# Patient Record
Sex: FEMALE | Race: WHITE | NOT HISPANIC OR LATINO | Employment: OTHER | URBAN - METROPOLITAN AREA
[De-identification: names, ages, dates, MRNs, and addresses within clinical notes are randomized per-mention and may not be internally consistent; named-entity substitution may affect disease eponyms.]

---

## 2021-01-18 ENCOUNTER — IMMUNIZATIONS (OUTPATIENT)
Dept: FAMILY MEDICINE CLINIC | Facility: HOSPITAL | Age: 86
End: 2021-01-18

## 2021-01-18 DIAGNOSIS — Z23 ENCOUNTER FOR IMMUNIZATION: Primary | ICD-10-CM

## 2021-01-18 PROCEDURE — 0001A SARS-COV-2 / COVID-19 MRNA VACCINE (PFIZER-BIONTECH) 30 MCG: CPT

## 2021-01-18 PROCEDURE — 91300 SARS-COV-2 / COVID-19 MRNA VACCINE (PFIZER-BIONTECH) 30 MCG: CPT

## 2021-02-06 ENCOUNTER — IMMUNIZATIONS (OUTPATIENT)
Dept: FAMILY MEDICINE CLINIC | Facility: HOSPITAL | Age: 86
End: 2021-02-06

## 2021-02-06 DIAGNOSIS — Z23 ENCOUNTER FOR IMMUNIZATION: Primary | ICD-10-CM

## 2021-02-06 PROCEDURE — 91300 SARS-COV-2 / COVID-19 MRNA VACCINE (PFIZER-BIONTECH) 30 MCG: CPT

## 2021-02-06 PROCEDURE — 0002A SARS-COV-2 / COVID-19 MRNA VACCINE (PFIZER-BIONTECH) 30 MCG: CPT

## 2022-05-08 ENCOUNTER — HOSPITAL ENCOUNTER (EMERGENCY)
Facility: HOSPITAL | Age: 87
Discharge: HOME | End: 2022-05-09
Attending: EMERGENCY MEDICINE | Admitting: EMERGENCY MEDICINE
Payer: MEDICARE

## 2022-05-08 DIAGNOSIS — E87.6 HYPOKALEMIA: Primary | ICD-10-CM

## 2022-05-08 DIAGNOSIS — E86.0 DEHYDRATION: ICD-10-CM

## 2022-05-08 DIAGNOSIS — R79.89 PRERENAL AZOTEMIA: ICD-10-CM
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
## 2022-05-08 DIAGNOSIS — G89.11 ACUTE PAIN DUE TO TRAUMA: ICD-10-CM

## 2022-05-08 DIAGNOSIS — S01.01XA LACERATION OF SCALP, INITIAL ENCOUNTER: ICD-10-CM

## 2022-05-08 PROCEDURE — 99284 EMERGENCY DEPT VISIT MOD MDM: CPT

## 2022-05-09 ENCOUNTER — APPOINTMENT (EMERGENCY)
Dept: CT IMAGING | Facility: HOSPITAL | Age: 87
End: 2022-05-09
Payer: MEDICARE

## 2022-05-09 VITALS
SYSTOLIC BLOOD PRESSURE: 163 MMHG | RESPIRATION RATE: 18 BRPM | TEMPERATURE: 97.4 F | OXYGEN SATURATION: 95 % | HEART RATE: 68 BPM | DIASTOLIC BLOOD PRESSURE: 66 MMHG

## 2022-05-09 LAB
ALBUMIN SERPL BCP-MCNC: 3.4 G/DL (ref 3.5–5)
ALP SERPL-CCNC: 43 U/L (ref 34–104)
ALT SERPL W P-5'-P-CCNC: 7 U/L (ref 7–52)
ANION GAP SERPL CALCULATED.3IONS-SCNC: 6 MMOL/L (ref 4–13)
AST SERPL W P-5'-P-CCNC: 12 U/L (ref 13–39)
ATRIAL RATE: 69 BPM
BASOPHILS # BLD AUTO: 0.04 THOUSANDS/ΜL (ref 0–0.1)
BASOPHILS NFR BLD AUTO: 1 % (ref 0–1)
BILIRUB SERPL-MCNC: 0.39 MG/DL (ref 0.2–1)
BUN SERPL-MCNC: 32 MG/DL (ref 5–25)
CALCIUM ALBUM COR SERPL-MCNC: 9.2 MG/DL (ref 8.3–10.1)
CALCIUM SERPL-MCNC: 8.7 MG/DL (ref 8.4–10.2)
CARDIAC TROPONIN I PNL SERPL HS: 13 NG/L
CHLORIDE SERPL-SCNC: 104 MMOL/L (ref 96–108)
CO2 SERPL-SCNC: 30 MMOL/L (ref 21–32)
CREAT SERPL-MCNC: 1.31 MG/DL (ref 0.6–1.3)
EOSINOPHIL # BLD AUTO: 0.1 THOUSAND/ΜL (ref 0–0.61)
EOSINOPHIL NFR BLD AUTO: 1 % (ref 0–6)
ERYTHROCYTE [DISTWIDTH] IN BLOOD BY AUTOMATED COUNT: 15.6 % (ref 11.6–15.1)
GFR SERPL CREATININE-BSD FRML MDRD: 34 ML/MIN/1.73SQ M
GLUCOSE SERPL-MCNC: 106 MG/DL (ref 65–140)
HCT VFR BLD AUTO: 34.9 % (ref 34.8–46.1)
HGB BLD-MCNC: 10.5 G/DL (ref 11.5–15.4)
IMM GRANULOCYTES # BLD AUTO: 0.03 THOUSAND/UL (ref 0–0.2)
IMM GRANULOCYTES NFR BLD AUTO: 0 % (ref 0–2)
LYMPHOCYTES # BLD AUTO: 1.73 THOUSANDS/ΜL (ref 0.6–4.47)
LYMPHOCYTES NFR BLD AUTO: 23 % (ref 14–44)
MCH RBC QN AUTO: 23 PG (ref 26.8–34.3)
MCHC RBC AUTO-ENTMCNC: 30.1 G/DL (ref 31.4–37.4)
MCV RBC AUTO: 76 FL (ref 82–98)
MONOCYTES # BLD AUTO: 0.78 THOUSAND/ΜL (ref 0.17–1.22)
MONOCYTES NFR BLD AUTO: 11 % (ref 4–12)
NEUTROPHILS # BLD AUTO: 4.71 THOUSANDS/ΜL (ref 1.85–7.62)
NEUTS SEG NFR BLD AUTO: 64 % (ref 43–75)
NRBC BLD AUTO-RTO: 0 /100 WBCS
P AXIS: 64 DEGREES
PLATELET # BLD AUTO: 220 THOUSANDS/UL (ref 149–390)
PMV BLD AUTO: 12.7 FL (ref 8.9–12.7)
POTASSIUM SERPL-SCNC: 3.4 MMOL/L (ref 3.5–5.3)
PR INTERVAL: 194 MS
PROT SERPL-MCNC: 6.5 G/DL (ref 6.4–8.4)
QRS AXIS: 65 DEGREES
QRSD INTERVAL: 84 MS
QT INTERVAL: 382 MS
QTC INTERVAL: 409 MS
RBC # BLD AUTO: 4.57 MILLION/UL (ref 3.81–5.12)
SODIUM SERPL-SCNC: 140 MMOL/L (ref 135–147)
T WAVE AXIS: 65 DEGREES
VENTRICULAR RATE: 69 BPM
WBC # BLD AUTO: 7.39 THOUSAND/UL (ref 4.31–10.16)

## 2022-05-09 PROCEDURE — 93010 ELECTROCARDIOGRAM REPORT: CPT | Performed by: INTERNAL MEDICINE

## 2022-05-09 PROCEDURE — 36415 COLL VENOUS BLD VENIPUNCTURE: CPT | Performed by: EMERGENCY MEDICINE

## 2022-05-09 PROCEDURE — 93005 ELECTROCARDIOGRAM TRACING: CPT

## 2022-05-09 PROCEDURE — 85025 COMPLETE CBC W/AUTO DIFF WBC: CPT | Performed by: EMERGENCY MEDICINE

## 2022-05-09 PROCEDURE — 90715 TDAP VACCINE 7 YRS/> IM: CPT | Performed by: EMERGENCY MEDICINE

## 2022-05-09 PROCEDURE — 70450 CT HEAD/BRAIN W/O DYE: CPT

## 2022-05-09 PROCEDURE — G1004 CDSM NDSC: HCPCS

## 2022-05-09 PROCEDURE — 80053 COMPREHEN METABOLIC PANEL: CPT | Performed by: EMERGENCY MEDICINE

## 2022-05-09 PROCEDURE — 99284 EMERGENCY DEPT VISIT MOD MDM: CPT | Performed by: EMERGENCY MEDICINE

## 2022-05-09 PROCEDURE — 84484 ASSAY OF TROPONIN QUANT: CPT | Performed by: EMERGENCY MEDICINE

## 2022-05-09 PROCEDURE — 12013 RPR F/E/E/N/L/M 2.6-5.0 CM: CPT | Performed by: EMERGENCY MEDICINE

## 2022-05-09 RX ORDER — POTASSIUM CHLORIDE 20 MEQ/1
40 TABLET, EXTENDED RELEASE ORAL ONCE
Status: COMPLETED | OUTPATIENT
Start: 2022-05-09 | End: 2022-05-09

## 2022-05-09 RX ORDER — GINSENG 100 MG
1 CAPSULE ORAL ONCE
Status: COMPLETED | OUTPATIENT
Start: 2022-05-09 | End: 2022-05-09

## 2022-05-09 RX ADMIN — POTASSIUM CHLORIDE 40 MEQ: 1500 TABLET, EXTENDED RELEASE ORAL at 03:09

## 2022-05-09 RX ADMIN — BACITRACIN 1 SMALL APPLICATION: 500 OINTMENT TOPICAL at 00:46

## 2022-05-09 RX ADMIN — TETANUS TOXOID, REDUCED DIPHTHERIA TOXOID AND ACELLULAR PERTUSSIS VACCINE, ADSORBED 0.5 ML: 5; 2.5; 8; 8; 2.5 SUSPENSION INTRAMUSCULAR at 00:47

## 2022-05-09 NOTE — ED PROVIDER NOTES
History  Chief Complaint   Patient presents with    Head Laceration     patient was attempting to cut her toenails when she fell off the couch  family member states that she thinks the clippers when into her forehead,      28-year-old female was working in her kitchen, slipped and fell and hit her head, patient reports she was trying to cut her toenails and may have had the clippers hit her in the head  Patient did not lose consciousness, has not had any vision changes, headache, dizziness, nausea, vomiting, chest pain, cough, shortness of breath, abdominal pain, urinary GI complaints and no other complaints  Patient does not remember last tetanus shot, so she was not dizzy before she fell, is not on any blood thinners  Patient now is complaining of lacerations on the left side of her forehead  Patient after fall had difficulty getting up and was on the ground for 2 hours  None       Past Medical History:   Diagnosis Date    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)     brain       History reviewed  No pertinent surgical history  History reviewed  No pertinent family history  I have reviewed and agree with the history as documented  E-Cigarette/Vaping    E-Cigarette Use Never User      E-Cigarette/Vaping Substances     Social History     Tobacco Use    Smoking status: Never Smoker    Smokeless tobacco: Never Used   Vaping Use    Vaping Use: Never used   Substance Use Topics    Alcohol use: Never    Drug use: Never       Review of Systems   Constitutional: Negative for fever  HENT: Negative for congestion  Eyes: Negative for visual disturbance  Respiratory: Negative for cough and shortness of breath  Cardiovascular: Negative for chest pain  Gastrointestinal: Negative for abdominal pain, constipation, diarrhea, nausea and vomiting  Endocrine: Negative for polyuria  Genitourinary: Negative for dysuria and hematuria  Musculoskeletal: Negative for myalgias  Skin: Positive for wound     Neurological: Negative for dizziness and headaches  Physical Exam  Physical Exam  Vitals and nursing note reviewed  Constitutional:       Appearance: Normal appearance  HENT:      Head: Normocephalic and atraumatic  Right Ear: External ear normal       Left Ear: External ear normal       Mouth/Throat:      Mouth: Mucous membranes are moist       Pharynx: Oropharynx is clear  Eyes:      Extraocular Movements: Extraocular movements intact  Conjunctiva/sclera: Conjunctivae normal    Cardiovascular:      Rate and Rhythm: Normal rate and regular rhythm  Heart sounds: Normal heart sounds  Pulmonary:      Effort: Pulmonary effort is normal       Breath sounds: Normal breath sounds  Abdominal:      General: Abdomen is flat  There is no distension  Palpations: Abdomen is soft  Musculoskeletal:         General: No deformity  Normal range of motion  Cervical back: Normal range of motion  Skin:     General: Skin is warm and dry  Comments: To lacerations on the left side of the forehead, one 1 5 cm L-shaped laceration over the temple, one linear 0 75 cm laceration over the zygomatic arch  Neurological:      General: No focal deficit present  Mental Status: She is alert  Cranial Nerves: No cranial nerve deficit  Sensory: No sensory deficit  Motor: No weakness        Gait: Gait normal    Psychiatric:         Mood and Affect: Mood normal          Behavior: Behavior normal          Vital Signs  ED Triage Vitals [05/08/22 2233]   Temperature Pulse Respirations Blood Pressure SpO2   (!) 97 4 °F (36 3 °C) 73 18 163/66 98 %      Temp Source Heart Rate Source Patient Position - Orthostatic VS BP Location FiO2 (%)   Oral Monitor Sitting Right arm --      Pain Score       No Pain           Vitals:    05/08/22 2233 05/09/22 0015   BP: 163/66    Pulse: 73 68   Patient Position - Orthostatic VS: Sitting          Visual Acuity      ED Medications  Medications tetanus-diphtheria-acellular pertussis (BOOSTRIX) IM injection 0 5 mL (0 5 mL Intramuscular Given 5/9/22 0047)   bacitracin topical ointment 1 small application (1 small application Topical Given 5/9/22 0046)   potassium chloride (K-DUR,KLOR-CON) CR tablet 40 mEq (40 mEq Oral Given 5/9/22 0309)       Diagnostic Studies  Results Reviewed     Procedure Component Value Units Date/Time    HS Troponin 0hr (reflex protocol) [[id number]]  (Normal) Collected: 05/09/22 0043    Lab Status: Final result Specimen: Blood from Arm, Right Updated: 05/09/22 0133     hs TnI 0hr 13 ng/L     Comprehensive metabolic panel [[id number]]  (Abnormal) Collected: 05/09/22 0043    Lab Status: Final result Specimen: Blood from Arm, Right Updated: 05/09/22 0127     Sodium 140 mmol/L      Potassium 3 4 mmol/L      Chloride 104 mmol/L      CO2 30 mmol/L      ANION GAP 6 mmol/L      BUN 32 mg/dL      Creatinine 1 31 mg/dL      Glucose 106 mg/dL      Calcium 8 7 mg/dL      Corrected Calcium 9 2 mg/dL      AST 12 U/L      ALT 7 U/L      Alkaline Phosphatase 43 U/L      Total Protein 6 5 g/dL      Albumin 3 4 g/dL      Total Bilirubin 0 39 mg/dL      eGFR 34 ml/min/1 73sq m     Narrative:      Mya guidelines for Chronic Kidney Disease (CKD):     Stage 1 with normal or high GFR (GFR > 90 mL/min/1 73 square meters)    Stage 2 Mild CKD (GFR = 60-89 mL/min/1 73 square meters)    Stage 3A Moderate CKD (GFR = 45-59 mL/min/1 73 square meters)    Stage 3B Moderate CKD (GFR = 30-44 mL/min/1 73 square meters)    Stage 4 Severe CKD (GFR = 15-29 mL/min/1 73 square meters)    Stage 5 End Stage CKD (GFR <15 mL/min/1 73 square meters)  Note: GFR calculation is accurate only with a steady state creatinine    CBC and differential [[id number]]  (Abnormal) Collected: 05/09/22 0043    Lab Status: Final result Specimen: Blood from Arm, Right Updated: 05/09/22 0104     WBC 7 39 Thousand/uL      RBC 4 57 Million/uL      Hemoglobin 10 5 g/dL      Hematocrit 34 9 %      MCV 76 fL      MCH 23 0 pg      MCHC 30 1 g/dL      RDW 15 6 %      MPV 12 7 fL      Platelets 363 Thousands/uL      nRBC 0 /100 WBCs      Neutrophils Relative 64 %      Immat GRANS % 0 %      Lymphocytes Relative 23 %      Monocytes Relative 11 %      Eosinophils Relative 1 %      Basophils Relative 1 %      Neutrophils Absolute 4 71 Thousands/µL      Immature Grans Absolute 0 03 Thousand/uL      Lymphocytes Absolute 1 73 Thousands/µL      Monocytes Absolute 0 78 Thousand/µL      Eosinophils Absolute 0 10 Thousand/µL      Basophils Absolute 0 04 Thousands/µL                  CT head without contrast   Final Result by Ranulfo Alcazar MD (05/09 0124)      No acute intracranial abnormality  Advanced chronic microangiopathic changes  Moderate ventriculomegaly which may be on the basis of volume loss although slightly out of proportion  Correlate clinically for superimposed component of communicating hydrocephalus i e  NPH  Postsurgical changes of prior left frontotemporal craniotomy and aneurysm clipping  Workstation performed: [id number]                    Procedures  Laceration repair    Date/Time: 5/9/2022 3:29 AM  Performed by: Anil Kathleen MD  Authorized by: Anil Kathleen MD   Consent: Verbal consent obtained  Written consent not obtained  Risks and benefits: risks, benefits and alternatives were discussed  Consent given by: patient  Patient identity confirmed: verbally with patient and arm band  Body area: head/neck  Location details: forehead  Wound length (cm): 1cm, 2cm    Foreign bodies: no foreign bodies  Tendon involvement: none  Nerve involvement: none  Vascular damage: no  Anesthesia: local infiltration    Anesthesia:  Local Anesthetic: lidocaine 1% without epinephrine  Anesthetic total: 3 mL    Sedation:  Patient sedated: no      Wound Dehiscence:  Superficial Wound Dehiscence: simple closure      Procedure Details:  Preparation: Patient was prepped and draped in the usual sterile fashion  Irrigation solution: saline  Debridement: none  Degree of undermining: none  Skin closure: 5-0 nylon  Number of sutures: 7  Technique: simple  Approximation: close  Approximation difficulty: simple  Dressing: antibiotic ointment (bandaids)               ED Course       MDM  Number of Diagnoses or Management Options  Acute pain due to trauma  Dehydration  Fall from standing  Hypokalemia  Laceration of scalp, initial encounter  Prerenal azotemia  Diagnosis management comments: Patient's workup for head trauma was unremarkable, CT was negative, patient's lacerations on her head were sutured up, patient was found to be hypokalemic and dehydrated with pre renal azotemia, patient reports that due to her dehydration she does not urinate very often and urinated just prior to arrival   After suturing the patient was asked if she could provide a urine sample  She is stating she would like to go home and does not want any IV fluids or to have her urine checked  Patient appears capable of declining medical care, her niece at the bedside is a nurse and is going to help her drink plenty of p o  fluids, the patient will return for suture removal and sooner if there are any complications or worsening of her symptoms  Patient is safe for discharge home with close follow-up with primary care as an outpatient        Disposition  Final diagnoses:   Hypokalemia   Prerenal azotemia   Laceration of scalp, initial encounter   Fall from standing   Acute pain due to trauma   Dehydration     Time reflects when diagnosis was documented in both MDM as applicable and the Disposition within this note     Time User Action Codes Description Comment    5/9/2022  1:37 AM Hattie Kwong Add [E87 6] Hypokalemia     5/9/2022  1:37 AM Hattie Kwong Add [R79 89] Prerenal azotemia     5/9/2022  1:37 AM Hattie Kwong Add [S01 01XA] Laceration of scalp, initial encounter 5/9/2022  1:37 AM Pricilla Lowers Add [V48  ZIGV] Fall from standing     5/9/2022  1:37 AM Pricilla Lowers Add [G89 11] Acute pain due to trauma     5/9/2022  1:37 AM Pricilla Lowers Add [E86 0] Dehydration       ED Disposition     ED Disposition Condition Date/Time Comment    Discharge Stable Mon May 9, 2022  3:29 AM Andrzej Rivera discharge to home/self care  Follow-up Information    None         Patient's Medications    No medications on file       No discharge procedures on file      PDMP Review     None          ED Provider  Electronically Signed by           Randall Robles MD  05/09/22 9097

## 2022-09-24 ENCOUNTER — HOSPITAL ENCOUNTER (INPATIENT)
Facility: HOSPITAL | Age: 87
LOS: 5 days | Discharge: NURSING, REHAB OR LONG-TERM CARE | DRG: 689 | End: 2022-09-30
Attending: EMERGENCY MEDICINE | Admitting: HOSPITALIST
Payer: MEDICARE

## 2022-09-24 DIAGNOSIS — R62.7 FTT (FAILURE TO THRIVE) IN ADULT: Primary | ICD-10-CM

## 2022-09-24 DIAGNOSIS — D64.9 ANEMIA, UNSPECIFIED TYPE: ICD-10-CM

## 2022-09-24 DIAGNOSIS — U07.1 COVID: ICD-10-CM

## 2022-09-24 PROBLEM — R60.0 LOWER EXTREMITY EDEMA: Status: ACTIVE | Noted: 2022-09-24

## 2022-09-24 PROBLEM — N18.9 CKD (CHRONIC KIDNEY DISEASE): Status: ACTIVE | Noted: 2022-09-24

## 2022-09-24 PROBLEM — G93.40 ACUTE ENCEPHALOPATHY: Status: ACTIVE | Noted: 2022-09-24

## 2022-09-24 PROBLEM — E78.5 HLD (HYPERLIPIDEMIA): Status: ACTIVE | Noted: 2022-09-24

## 2022-09-24 PROBLEM — E03.9 HYPOTHYROIDISM: Status: ACTIVE | Noted: 2022-09-24

## 2022-09-24 PROBLEM — I10 HTN (HYPERTENSION): Status: ACTIVE | Noted: 2022-09-24

## 2022-09-24 LAB
ALBUMIN SERPL BCP-MCNC: 3.2 G/DL (ref 3.5–5)
ALP SERPL-CCNC: 42 U/L (ref 34–104)
ALT SERPL W P-5'-P-CCNC: 6 U/L (ref 7–52)
ANION GAP SERPL CALCULATED.3IONS-SCNC: 6 MMOL/L (ref 4–13)
AST SERPL W P-5'-P-CCNC: 14 U/L (ref 13–39)
BASOPHILS # BLD AUTO: 0.03 THOUSANDS/ΜL (ref 0–0.1)
BASOPHILS NFR BLD AUTO: 1 % (ref 0–1)
BILIRUB SERPL-MCNC: 0.5 MG/DL (ref 0.2–1)
BUN SERPL-MCNC: 25 MG/DL (ref 5–25)
CALCIUM ALBUM COR SERPL-MCNC: 9.2 MG/DL (ref 8.3–10.1)
CALCIUM SERPL-MCNC: 8.6 MG/DL (ref 8.4–10.2)
CHLORIDE SERPL-SCNC: 99 MMOL/L (ref 96–108)
CO2 SERPL-SCNC: 30 MMOL/L (ref 21–32)
CREAT SERPL-MCNC: 1.3 MG/DL (ref 0.6–1.3)
EOSINOPHIL # BLD AUTO: 0.13 THOUSAND/ΜL (ref 0–0.61)
EOSINOPHIL NFR BLD AUTO: 2 % (ref 0–6)
ERYTHROCYTE [DISTWIDTH] IN BLOOD BY AUTOMATED COUNT: 15.8 % (ref 11.6–15.1)
GFR SERPL CREATININE-BSD FRML MDRD: 35 ML/MIN/1.73SQ M
GLUCOSE SERPL-MCNC: 94 MG/DL (ref 65–140)
HCT VFR BLD AUTO: 32.7 % (ref 34.8–46.1)
HGB BLD-MCNC: 9.7 G/DL (ref 11.5–15.4)
IMM GRANULOCYTES # BLD AUTO: 0.04 THOUSAND/UL (ref 0–0.2)
IMM GRANULOCYTES NFR BLD AUTO: 1 % (ref 0–2)
LYMPHOCYTES # BLD AUTO: 1.03 THOUSANDS/ΜL (ref 0.6–4.47)
LYMPHOCYTES NFR BLD AUTO: 17 % (ref 14–44)
MCH RBC QN AUTO: 22.4 PG (ref 26.8–34.3)
MCHC RBC AUTO-ENTMCNC: 29.7 G/DL (ref 31.4–37.4)
MCV RBC AUTO: 76 FL (ref 82–98)
MONOCYTES # BLD AUTO: 0.78 THOUSAND/ΜL (ref 0.17–1.22)
MONOCYTES NFR BLD AUTO: 13 % (ref 4–12)
NEUTROPHILS # BLD AUTO: 3.95 THOUSANDS/ΜL (ref 1.85–7.62)
NEUTS SEG NFR BLD AUTO: 66 % (ref 43–75)
NRBC BLD AUTO-RTO: 0 /100 WBCS
PLATELET # BLD AUTO: 177 THOUSANDS/UL (ref 149–390)
PMV BLD AUTO: 12.2 FL (ref 8.9–12.7)
POTASSIUM SERPL-SCNC: 3.7 MMOL/L (ref 3.5–5.3)
PROT SERPL-MCNC: 6.4 G/DL (ref 6.4–8.4)
RBC # BLD AUTO: 4.33 MILLION/UL (ref 3.81–5.12)
SODIUM SERPL-SCNC: 135 MMOL/L (ref 135–147)
TSH SERPL DL<=0.05 MIU/L-ACNC: 1.69 UIU/ML (ref 0.45–4.5)
WBC # BLD AUTO: 5.96 THOUSAND/UL (ref 4.31–10.16)

## 2022-09-24 PROCEDURE — 93005 ELECTROCARDIOGRAM TRACING: CPT

## 2022-09-24 PROCEDURE — 84443 ASSAY THYROID STIM HORMONE: CPT | Performed by: EMERGENCY MEDICINE

## 2022-09-24 PROCEDURE — 99220 PR INITIAL OBSERVATION CARE/DAY 70 MINUTES: CPT | Performed by: PHYSICIAN ASSISTANT

## 2022-09-24 PROCEDURE — 80053 COMPREHEN METABOLIC PANEL: CPT | Performed by: EMERGENCY MEDICINE

## 2022-09-24 PROCEDURE — 99285 EMERGENCY DEPT VISIT HI MDM: CPT

## 2022-09-24 PROCEDURE — 36415 COLL VENOUS BLD VENIPUNCTURE: CPT | Performed by: EMERGENCY MEDICINE

## 2022-09-24 PROCEDURE — 85025 COMPLETE CBC W/AUTO DIFF WBC: CPT | Performed by: EMERGENCY MEDICINE

## 2022-09-24 PROCEDURE — 99285 EMERGENCY DEPT VISIT HI MDM: CPT | Performed by: EMERGENCY MEDICINE

## 2022-09-24 RX ORDER — AMLODIPINE BESYLATE 5 MG/1
1 TABLET ORAL DAILY
COMMUNITY
Start: 2022-09-16 | End: 2022-09-30

## 2022-09-24 RX ORDER — LEVOTHYROXINE SODIUM 0.03 MG/1
25 TABLET ORAL DAILY
COMMUNITY
Start: 2022-07-04

## 2022-09-24 RX ORDER — LOSARTAN POTASSIUM AND HYDROCHLOROTHIAZIDE 12.5; 5 MG/1; MG/1
1 TABLET ORAL DAILY
COMMUNITY
Start: 2022-05-08 | End: 2022-09-30

## 2022-09-24 RX ORDER — SIMVASTATIN 20 MG
20 TABLET ORAL DAILY
COMMUNITY
Start: 2022-05-20

## 2022-09-24 NOTE — ED PROVIDER NOTES
History  Chief Complaint   Patient presents with    Hypertension     States her family called EMS because her blood pressure is high  Lives alone---family is worried she cannot live by herself  Pt has no complaints     89 YR FEMALE LIVES ALONE-- BUT HAS FAMILY NEARBY WITH DAILY CHECKS --- NIECE WAS WITH HER LAST NIGHT- LEFT HER IN HER - WHICH SOMETIMES SHE SLEEPS IN -- NIECE CALLED HER SEVERAL TIMES TO CHECK ON HER THIS AFTERNOON AND GOT NO RESPONSE--  NIECE [address] PT STILL IN RECLINER- COVERED IN URINE-- AND SEEMED TO BE MORE UNSTEADY THAN USUAL- NO TRAUMA- NO OTHER COMPS-- PT DENIES ANY COMPLAINTS-       History provided by:  Patient and relative   used: No        None       Past Medical History:   Diagnosis Date    Aneurysm ([facility] [address] )     brain       History reviewed  No pertinent surgical history  History reviewed  No pertinent family history  I have reviewed and agree with the history as documented  E-Cigarette/Vaping    E-Cigarette Use Never User      E-Cigarette/Vaping Substances     Social History     Tobacco Use    Smoking status: Never Smoker    Smokeless tobacco: Never Used   Vaping Use    Vaping Use: Never used   Substance Use Topics    Alcohol use: Never    Drug use: Never       Review of Systems   Constitutional: Positive for activity change  Negative for appetite change, chills, diaphoresis, fatigue, fever and unexpected weight change  HENT: Negative  Eyes: Negative  Respiratory: Negative  Cardiovascular: Negative  Gastrointestinal: Negative  Endocrine: Negative  Genitourinary: Negative  Musculoskeletal: Positive for gait problem  Negative for arthralgias, back pain, joint swelling, myalgias, neck pain and neck stiffness  Skin: Negative  Allergic/Immunologic: Negative  Neurological: Negative for dizziness, tremors, seizures, syncope, facial asymmetry, speech difficulty, weakness, light-headedness, numbness and headaches  Hematological: Negative  Psychiatric/Behavioral: Negative  Physical Exam  Physical Exam  Vitals and nursing note reviewed  Constitutional:       General: She is not in acute distress  Appearance: Normal appearance  She is not ill-appearing, toxic-appearing or diaphoretic  Comments: AVSS-- PULSE OX 97 % ON RA- INTERPRETATION IS NORMAL- NO INTERVENTION -    HENT:      Head: Normocephalic and atraumatic  Comments: NO SCALP TENDERNESS/CONTUSION/ HEMATOMA     Right Ear: Tympanic membrane, ear canal and external ear normal  There is no impacted cerumen  Left Ear: Tympanic membrane, ear canal and external ear normal  There is no impacted cerumen  Nose: Nose normal  No congestion or rhinorrhea  Mouth/Throat:      Mouth: Mucous membranes are moist       Pharynx: Oropharynx is clear  No oropharyngeal exudate or posterior oropharyngeal erythema  Eyes:      General: No scleral icterus  Right eye: No discharge  Left eye: No discharge  Extraocular Movements: Extraocular movements intact  Conjunctiva/sclera: Conjunctivae normal       Pupils: Pupils are equal, round, and reactive to light  Comments: MM PINK   Neck:      Vascular: No carotid bruit  Comments: NO PMT C/T/L/S SPIN E  Cardiovascular:      Rate and Rhythm: Normal rate and regular rhythm  Pulses: Normal pulses  Heart sounds: Normal heart sounds  No murmur heard  No friction rub  No gallop  Pulmonary:      Effort: Pulmonary effort is normal  No respiratory distress  Breath sounds: Normal breath sounds  No stridor  No wheezing, rhonchi or rales  Chest:      Chest wall: No tenderness  Abdominal:      General: Bowel sounds are normal  There is no distension  Palpations: Abdomen is soft  There is no mass  Tenderness: There is no abdominal tenderness  There is no right CVA tenderness, left CVA tenderness, guarding or rebound  Hernia: No hernia is present  Comments: SOFT NT/ND- NO HSM- NO CVA TENDERNESS/ NO ASCITES- NO PERITONEAL SIGNS- NO PULSATILE ABD MASS/BRUIT/ TENDERNESS   Musculoskeletal:         General: No swelling, tenderness, deformity or signs of injury  Normal range of motion  Cervical back: Normal range of motion and neck supple  No rigidity or tenderness  Right lower leg: Edema present  Left lower leg: Edema present  Comments: TRACE BLE PRETIBIAL EDEMA- NT- NO ASYM/ ERYTHEMA- EQUAL BILATERAL RADIAL/DP PULSES   Lymphadenopathy:      Cervical: No cervical adenopathy  Skin:     Capillary Refill: Capillary refill takes less than 2 seconds  Coloration: Skin is pale  Skin is not jaundiced  Findings: No bruising, erythema, lesion or rash  Neurological:      General: No focal deficit present  Mental Status: She is alert and oriented to person, place, and time  Mental status is at baseline  Cranial Nerves: No cranial nerve deficit  Sensory: No sensory deficit  Motor: No weakness        Comments: UNSTEADY ON FEET-- NON FOCAL NEURO EXAM    Psychiatric:         Mood and Affect: Mood normal          Behavior: Behavior normal          Vital Signs  ED Triage Vitals   Temperature Pulse Respirations Blood Pressure SpO2   09/24/22 1435 09/24/22 1435 09/24/22 1435 09/24/22 1435 09/24/22 1435   97 9 °F (36 6 °C) 78 18 123/55 98 %      Temp Source Heart Rate Source Patient Position - Orthostatic VS BP Location FiO2 (%)   09/24/22 1435 09/24/22 1435 09/24/22 1600 09/24/22 1435 --   Oral Monitor Sitting Right arm       Pain Score       09/24/22 1435       No Pain           Vitals:    09/24/22 1435 09/24/22 1600   BP: 123/55 118/57   Pulse: 78 76   Patient Position - Orthostatic VS:  Sitting         Visual Acuity      ED Medications  Medications - No data to display    Diagnostic Studies  Results Reviewed     Procedure Component Value Units Date/Time    CBC and differential [[id number]]  (Abnormal) Collected: 09/24/22 3606 Lab Status: Final result Specimen: Blood from Arm, Left Updated: 09/24/22 1720     WBC 5 96 Thousand/uL      RBC 4 33 Million/uL      Hemoglobin 9 7 g/dL      Hematocrit 32 7 %      MCV 76 fL      MCH 22 4 pg      MCHC 29 7 g/dL      RDW 15 8 %      MPV 12 2 fL      Platelets 342 Thousands/uL      nRBC 0 /100 WBCs      Neutrophils Relative 66 %      Immat GRANS % 1 %      Lymphocytes Relative 17 %      Monocytes Relative 13 %      Eosinophils Relative 2 %      Basophils Relative 1 %      Neutrophils Absolute 3 95 Thousands/µL      Immature Grans Absolute 0 04 Thousand/uL      Lymphocytes Absolute 1 03 Thousands/µL      Monocytes Absolute 0 78 Thousand/µL      Eosinophils Absolute 0 13 Thousand/µL      Basophils Absolute 0 03 Thousands/µL     TSH [[id number]] Collected: 09/24/22 1657    Lab Status: In process Specimen: Blood from Arm, Left Updated: 09/24/22 1716    Comprehensive metabolic panel [[id number]] Collected: 09/24/22 1657    Lab Status: In process Specimen: Blood from Arm, Left Updated: 09/24/22 1716    UA (URINE) with reflex to Scope [[id number]]     Lab Status: No result Specimen: Urine                  No orders to display              Procedures  Procedures         ED Course  ED Course as of 09/24/22 1804   Sat Sep 24, 2022   1734 Er md note-  prehospital 12 lead ecg reviewed and compared by er md- pvc and pac have resolved- no other sign changes   1758 Er md note- slim tiger texted for admit                                             MDM    Disposition  Final diagnoses:   None     ED Disposition     None      Follow-up Information    None         Patient's Medications    No medications on file       No discharge procedures on file      PDMP Review     None          ED Provider  Electronically Signed by           Shea Nicole MD  09/25/22 5322

## 2022-09-24 NOTE — ED PROCEDURE NOTE
PROCEDURE  ECG 12 Lead Documentation Only    Date/Time: 9/24/2022 5:18 PM  Performed by: Marta Naranjo MD  Authorized by: Marta Naranjo MD     Indications / Diagnosis:  Weakness   ECG reviewed by me, the ED Provider: yes    Patient location:  ED and bedside  Previous ECG:     Previous ECG:  Unavailable    Comparison to cardiac monitor: Yes    Interpretation:     Interpretation: non-specific    Rate:     ECG rate:  76    ECG rate assessment: normal    Rhythm:     Rhythm: sinus rhythm    Ectopy:     Ectopy: none    QRS:     QRS axis:  Normal    QRS intervals:  Normal  Conduction:     Conduction: normal    ST segments:     ST segments:  Normal  T waves:     T waves: flattening      Flattening:  AVL, V1 and V2  Q waves:     Q waves:  AVL and V1  Comments:      - no ecg signs of ischemia/ injury/ r heart strain/ carmelo/pericarditis          Marta Naranjo MD  09/24/22 5872

## 2022-09-25 ENCOUNTER — APPOINTMENT (OUTPATIENT)
Dept: NON INVASIVE DIAGNOSTICS | Facility: HOSPITAL | Age: 87
DRG: 689 | End: 2022-09-25
Payer: MEDICARE

## 2022-09-25 PROBLEM — N39.0 UTI (URINARY TRACT INFECTION): Status: ACTIVE | Noted: 2022-09-25

## 2022-09-25 LAB
AORTIC ROOT: 3 CM
APICAL FOUR CHAMBER EJECTION FRACTION: 67 %
ATRIAL RATE: 76 BPM
BACTERIA UR QL AUTO: ABNORMAL /HPF
BILIRUB UR QL STRIP: NEGATIVE
CLARITY UR: ABNORMAL
COLOR UR: YELLOW
E WAVE DECELERATION TIME: 200 MS
FERRITIN SERPL-MCNC: 79 NG/ML (ref 8–388)
FOLATE SERPL-MCNC: 15.2 NG/ML (ref 3.1–17.5)
FRACTIONAL SHORTENING: 31 % (ref 28–44)
GLUCOSE UR STRIP-MCNC: NEGATIVE MG/DL
HGB UR QL STRIP.AUTO: ABNORMAL
INTERVENTRICULAR SEPTUM IN DIASTOLE (PARASTERNAL SHORT AXIS VIEW): 1 CM
INTERVENTRICULAR SEPTUM: 1 CM (ref 0.6–1.1)
IRON SATN MFR SERPL: 8 % (ref 15–50)
IRON SERPL-MCNC: 16 UG/DL (ref 50–170)
KETONES UR STRIP-MCNC: NEGATIVE MG/DL
LAAS-AP2: 15.1 CM2
LAAS-AP4: 19.8 CM2
LEFT ATRIUM SIZE: 3.8 CM
LEFT INTERNAL DIMENSION IN SYSTOLE: 2 CM (ref 2.1–4)
LEFT VENTRICULAR INTERNAL DIMENSION IN DIASTOLE: 2.9 CM (ref 3.5–6)
LEFT VENTRICULAR POSTERIOR WALL IN END DIASTOLE: 0.9 CM
LEFT VENTRICULAR STROKE VOLUME: 19 ML
LEUKOCYTE ESTERASE UR QL STRIP: ABNORMAL
LVSV (TEICH): 19 ML
MUCOUS THREADS UR QL AUTO: ABNORMAL
MV E'TISSUE VEL-SEP: 7 CM/S
MV PEAK A VEL: 1.31 M/S
MV PEAK E VEL: 105 CM/S
MV STENOSIS PRESSURE HALF TIME: 58 MS
MV VALVE AREA P 1/2 METHOD: 3.79 CM2
NITRITE UR QL STRIP: POSITIVE
NON-SQ EPI CELLS URNS QL MICRO: ABNORMAL /HPF
P AXIS: 93 DEGREES
PH UR STRIP.AUTO: 5.5 [PH]
PLATELET # BLD AUTO: 212 THOUSANDS/UL (ref 149–390)
PMV BLD AUTO: 11.8 FL (ref 8.9–12.7)
PR INTERVAL: 152 MS
PROT UR STRIP-MCNC: ABNORMAL MG/DL
QRS AXIS: 85 DEGREES
QRSD INTERVAL: 70 MS
QT INTERVAL: 400 MS
QTC INTERVAL: 450 MS
RA PRESSURE ESTIMATED: 8 MMHG
RBC #/AREA URNS AUTO: ABNORMAL /HPF
RIGHT ATRIUM AREA SYSTOLE A4C: 14.9 CM2
RIGHT VENTRICLE ID DIMENSION: 3.6 CM
RV PSP: 37 MMHG
SL CV LEFT ATRIUM LENGTH A2C: 4.7 CM
SL CV LV EF: 70
SL CV PED ECHO LEFT VENTRICLE DIASTOLIC VOLUME (MOD BIPLANE) 2D: 32 ML
SL CV PED ECHO LEFT VENTRICLE SYSTOLIC VOLUME (MOD BIPLANE) 2D: 13 ML
SP GR UR STRIP.AUTO: 1.01 (ref 1–1.03)
T WAVE AXIS: 91 DEGREES
TIBC SERPL-MCNC: 191 UG/DL (ref 250–450)
TR MAX PG: 29 MMHG
TR PEAK VELOCITY: 2.7 M/S
TRICUSPID VALVE PEAK REGURGITATION VELOCITY: 2.67 M/S
UROBILINOGEN UR STRIP-ACNC: <2 MG/DL
VENTRICULAR RATE: 76 BPM
VIT B12 SERPL-MCNC: 305 PG/ML (ref 100–900)
WBC #/AREA URNS AUTO: ABNORMAL /HPF
WBC CLUMPS # UR AUTO: PRESENT /UL

## 2022-09-25 PROCEDURE — 83540 ASSAY OF IRON: CPT | Performed by: PHYSICIAN ASSISTANT

## 2022-09-25 PROCEDURE — 83550 IRON BINDING TEST: CPT | Performed by: PHYSICIAN ASSISTANT

## 2022-09-25 PROCEDURE — 82746 ASSAY OF FOLIC ACID SERUM: CPT | Performed by: PHYSICIAN ASSISTANT

## 2022-09-25 PROCEDURE — 82728 ASSAY OF FERRITIN: CPT | Performed by: PHYSICIAN ASSISTANT

## 2022-09-25 PROCEDURE — 87186 SC STD MICRODIL/AGAR DIL: CPT | Performed by: PHYSICIAN ASSISTANT

## 2022-09-25 PROCEDURE — 99232 SBSQ HOSP IP/OBS MODERATE 35: CPT | Performed by: HOSPITALIST

## 2022-09-25 PROCEDURE — 97163 PT EVAL HIGH COMPLEX 45 MIN: CPT

## 2022-09-25 PROCEDURE — 93306 TTE W/DOPPLER COMPLETE: CPT | Performed by: INTERNAL MEDICINE

## 2022-09-25 PROCEDURE — 87086 URINE CULTURE/COLONY COUNT: CPT | Performed by: PHYSICIAN ASSISTANT

## 2022-09-25 PROCEDURE — 82607 VITAMIN B-12: CPT | Performed by: PHYSICIAN ASSISTANT

## 2022-09-25 PROCEDURE — 87077 CULTURE AEROBIC IDENTIFY: CPT | Performed by: PHYSICIAN ASSISTANT

## 2022-09-25 PROCEDURE — 85049 AUTOMATED PLATELET COUNT: CPT | Performed by: PHYSICIAN ASSISTANT

## 2022-09-25 PROCEDURE — 81001 URINALYSIS AUTO W/SCOPE: CPT | Performed by: PHYSICIAN ASSISTANT

## 2022-09-25 PROCEDURE — 93010 ELECTROCARDIOGRAM REPORT: CPT | Performed by: INTERNAL MEDICINE

## 2022-09-25 PROCEDURE — 93306 TTE W/DOPPLER COMPLETE: CPT

## 2022-09-25 RX ORDER — HEPARIN SODIUM 5000 [USP'U]/ML
5000 INJECTION, SOLUTION INTRAVENOUS; SUBCUTANEOUS EVERY 8 HOURS SCHEDULED
Status: DISCONTINUED | OUTPATIENT
Start: 2022-09-25 | End: 2022-09-30 | Stop reason: HOSPADM

## 2022-09-25 RX ORDER — PRAVASTATIN SODIUM 40 MG
40 TABLET ORAL
Status: DISCONTINUED | OUTPATIENT
Start: 2022-09-25 | End: 2022-09-28

## 2022-09-25 RX ORDER — LEVOTHYROXINE SODIUM 0.03 MG/1
25 TABLET ORAL
Status: DISCONTINUED | OUTPATIENT
Start: 2022-09-25 | End: 2022-09-30 | Stop reason: HOSPADM

## 2022-09-25 RX ORDER — CEFTRIAXONE 1 G/50ML
1000 INJECTION, SOLUTION INTRAVENOUS EVERY 24 HOURS
Status: DISCONTINUED | OUTPATIENT
Start: 2022-09-25 | End: 2022-09-28

## 2022-09-25 RX ADMIN — IRON SUCROSE 200 MG: 20 INJECTION, SOLUTION INTRAVENOUS at 16:42

## 2022-09-25 RX ADMIN — PRAVASTATIN SODIUM 40 MG: 40 TABLET ORAL at 16:24

## 2022-09-25 RX ADMIN — HEPARIN SODIUM 5000 UNITS: 5000 INJECTION INTRAVENOUS; SUBCUTANEOUS at 13:40

## 2022-09-25 RX ADMIN — HEPARIN SODIUM 5000 UNITS: 5000 INJECTION INTRAVENOUS; SUBCUTANEOUS at 21:56

## 2022-09-25 RX ADMIN — LEVOTHYROXINE SODIUM 25 MCG: 25 TABLET ORAL at 06:44

## 2022-09-25 RX ADMIN — HEPARIN SODIUM 5000 UNITS: 5000 INJECTION INTRAVENOUS; SUBCUTANEOUS at 06:44

## 2022-09-25 RX ADMIN — CEFTRIAXONE 1000 MG: 1 INJECTION, SOLUTION INTRAVENOUS at 06:39

## 2022-09-25 NOTE — PLAN OF CARE
Problem: PHYSICAL THERAPY ADULT  Goal: Performs mobility at highest level of function for planned discharge setting  See evaluation for individualized goals  Description: Treatment/Interventions: Functional transfer training, LE strengthening/ROM, Therapeutic exercise, Endurance training, Cognitive reorientation, Patient/family training, Equipment eval/education, Bed mobility, Gait training          See flowsheet documentation for full assessment, interventions and recommendations  9/25/2022 1606 by Camila Rao PT  Note: Prognosis: Fair  Problem List: Decreased strength, Impaired balance, Decreased mobility, Decreased cognition, Impaired judgement, Decreased safety awareness (gait deviations, limited insight into current deficits)  Assessment: Uri Veloz is a 80 y o  Female who [facility]/24/2022 and diagnosis of acute encephalopathy  Orders for PT eval and treat received, w/ activity orders of up w/ assist and fall risk precautions  Comorbidities affecting pt at time of evaluation include: R knee osteoarthritis, brain aneurysm, CKD, HTN, anemia  Personal factors affecting DC include: inability to ambulate household distances, inability to navigate level surfaces w/o external assistance, decreased cognition, limited home support, limited insight into impairments and inability to perform IADLs  At baseline, pt mobilizes independently w/o AD vs w/ RW, and w/ 0 fall(s) in the previous 6 months  Upon evaluation, pt presents w/ the following deficits: weakness, edema of extremities, impaired balance and gait deviations  Pt currently requires mod Ax1 for bed mobility, min Ax1 for transfers, and mod Ax1 w/o AD for ambulation   Pt's clinical presentation is unstable/unpredictable due to need for assist w/ all phases of mobility when usually mobilizing independently, tolerance to only 3 feet of ambulation, need for input for mobility technique/safety and recent drastic decline in mobility compared to baseline  Pt is at an increased risk of falls due to impaired balance, impaired cognition, decreased safety awareness, and limited insight into current deficits  From a PT/mobility standpoint, given the above findings, DC recommendation is: Post-acute inpatient rehabilitation  During current admission, pt will benefit from continued skilled inpatient PT in the acute care setting in order to address the above deficits and to maximize function and mobility prior to DC from acute care  Barriers to Discharge: Decreased caregiver support (pt lives alone)     PT Discharge Recommendation: Post acute rehabilitation services    See flowsheet documentation for full assessment

## 2022-09-25 NOTE — H&P
[address] 1/10/1928, 80 y o  female MRN: [id number]  Unit/Bed#: S -01 Encounter: [id number]  Primary Care Provider: Lydia Tran MD   Date and time admitted to hospital: 9/24/2022  2:27 PM    * Acute encephalopathy  Assessment & Plan  · Presented with confusion, generalized weakness, fatigue and bowel, bladder incontinence  · Uncertain etiology; r/o infectious source vs advanced age  · Lab work unremarkable thus far other than for mild anemia  Cr at baseline  TSH within normal limits  · Obtain UA  · Obtain vitamin B12 and folate levels  · Monitor neuro checks  · Fall precautions  · PT/OT eval  Patient resides at home alone  CM consult for possible placement  Bilateral lower extremity edema  Assessment & Plan  · Patient with pitting edema of bilateral lower extremities on exam  She notes that her edema has been present and worsening for the past few months  Murmur also noted on exam    · No prior h/o cardiac disease, CHF noted  · R/o cardiac etiology vs due to amlodipine use  · Hold amlodipine  · Obtain echo  · Elevate extremities  · Monitor I&Os and daily weights  Anemia  Assessment & Plan  · Hgb 9 7 on presentation with prior Hgb of 10 5 five months ago  · Per patient's niece, no blood noted in stool or urine  · Obtain iron panel  · Repeat CBC in AM      HTN (hypertension)  Assessment & Plan  · BP low normal since presentation  · Hold amlodipine given lower extremity edema  · Will also hold ARB-HCTZ in AM given poor PO intake the last 2 days  CKD (chronic kidney disease)  Assessment & Plan  Lab Results   Component Value Date    EGFR 35 09/24/2022    EGFR 34 05/09/2022    CREATININE 1 30 09/24/2022    CREATININE 1 31 (H) 05/09/2022     · Cr at baseline on presentation  · Encourage PO intake as able  Will hold off on IV fluids given LE edema  · Continue to monitor       Hypothyroidism  Assessment & Plan  · Continue levothyroxine  · TSH within normal limits  HLD (hyperlipidemia)  Assessment & Plan  · Continue statin  VTE Pharmacologic Prophylaxis: VTE Score: 3 Moderate Risk (Score 3-4) - Pharmacological DVT Prophylaxis Ordered: heparin  Code Status: level 3 DNAR/DNI  Discussion with family: Updated  (niece) via phone  Anticipated Length of Stay: Patient will be admitted on an observation basis with an anticipated length of stay of less than 2 midnights secondary to lower extremity edema, echo pending; need for CM consult for possible placement  Total Time for Visit, including Counseling / Coordination of Care: 70 minutes Greater than 50% of this total time spent on direct patient counseling and coordination of care  Chief Complaint: altered mental status    History of Present Illness:  Ubaldo Coppola is a 80 y o  female with a PMH of HTN, HLD, hypothyroidism who presents with alert mental status  Patient states that her nieces sent her to the hospital today but is uncertain as to why she is here and offers no complaints at this time  She resides at home alone and states that she has someone that comes to help her 3 days a week and that her nieces check on her frequently  She denies recent falls and states that she uses her walker only on occasion but otherwise ambulates independently  She denies recent changes in her appetite, notes that it is poor at baseline but denies recent weight loss  She does admit to worsening bilateral lower extremity edema for the past few months  Spoke with patient's niece, Reyes Ramsey, via phone who provided further history  She reports that the patient appeared to be her usual state of health 2 days and then yesterday when she went to see the patient around 7pm, she was sitting in her chair and appeared to be sleepy so she left after about 30 minutes   Her niece called her 3 times today without answer and went to her house to check on her around 12pm at which time she found her in the same spot she was in last evening when she left the patient  She notes that the patient was sitting in stool and urine and was confused, generally weak  Her niece states that it very unusual for the patient to soil herself  Patient's niece notes that she was too weak to ambulate and seemed more confused than usual when answering question therefore they brought her to the ED for evaluation  Review of Systems:  Review of Systems   Unable to perform ROS: Mental status change   Constitutional: Positive for fatigue  Negative for appetite change (reports poor appetite at baseline)  Cardiovascular: Positive for leg swelling  Genitourinary:        Incontinence   Neurological: Positive for weakness  Psychiatric/Behavioral: Positive for confusion  Past Medical and Surgical History:   Past Medical History:   Diagnosis Date    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    CKD (chronic kidney disease)     HLD (hyperlipidemia)     HTN (hypertension)     Hypothyroidism        History reviewed  No pertinent surgical history  Meds/Allergies:  Prior to Admission medications    Not on File     I have reviewed home medications with a medical source (PCP, Pharmacy, other)  Allergies: No Known Allergies    Social History:  Marital Status: Single   Occupation:  Patient Pre-hospital Living Situation: Home, Alone  Patient Pre-hospital Level of Mobility: walks  Patient Pre-hospital Diet Restrictions:   Substance Use History:   Social History     Substance and Sexual Activity   Alcohol Use Never     Social History     Tobacco Use   Smoking Status Never Smoker   Smokeless Tobacco Never Used     Social History     Substance and Sexual Activity   Drug Use Never       Family History:  History reviewed  No pertinent family history      Physical Exam:     Vitals:   Blood Pressure: 91/57 (09/24/22 2224)  Pulse: 85 (09/24/22 2224)  Temperature: 98 5 °F (36 9 °C) (09/24/22 2224)  Temp Source: Oral (09/24/22 1435)  Respirations: 16 (09/24/22 2224)  Weight - Scale: 56 6 kg (124 lb 12 5 oz) (09/24/22 1435)  SpO2: 96 % (09/24/22 2224)    Physical Exam  Vitals and nursing note reviewed  Constitutional:       General: She is not in acute distress  Appearance: She is cachectic  She is not ill-appearing  Eyes:      Conjunctiva/sclera: Conjunctivae normal    Cardiovascular:      Rate and Rhythm: Normal rate and regular rhythm  Heart sounds: Murmur heard  Pulmonary:      Effort: Pulmonary effort is normal  No respiratory distress  Breath sounds: Normal breath sounds  Abdominal:      General: Bowel sounds are normal       Palpations: Abdomen is soft  Musculoskeletal:      Right lower leg: Edema (pitting edema) present  Left lower leg: Edema (pitting) present  Skin:     General: Skin is warm and dry  Coloration: Skin is pale  Neurological:      General: No focal deficit present  Mental Status: She is alert  Comments: Oriented to person and place  Answers most questions appropriately but confused regarding situation  Psychiatric:         Mood and Affect: Mood normal          Behavior: Behavior is cooperative  Additional Data:     Lab Results:  Results from last 7 days   Lab Units 09/24/22  1657   WBC Thousand/uL 5 96   HEMOGLOBIN g/dL 9 7*   HEMATOCRIT % 32 7*   PLATELETS Thousands/uL 177   NEUTROS PCT % 66   LYMPHS PCT % 17   MONOS PCT % 13*   EOS PCT % 2     Results from last 7 days   Lab Units 09/24/22  1657   SODIUM mmol/L 135   POTASSIUM mmol/L 3 7   CHLORIDE mmol/L 99   CO2 mmol/L 30   BUN mg/dL 25   CREATININE mg/dL 1 30   ANION GAP mmol/L 6   CALCIUM mg/dL 8 6   ALBUMIN g/dL 3 2*   TOTAL BILIRUBIN mg/dL 0 50   ALK PHOS U/L 42   ALT U/L 6*   AST U/L 14   GLUCOSE RANDOM mg/dL 94                       Imaging: No pertinent imaging reviewed  No orders to display       EKG and Other Studies Reviewed on Admission:   · EKG: NSR   HR 76     ** Please Note: This note has been constructed using a voice recognition system   **

## 2022-09-25 NOTE — PHYSICAL THERAPY NOTE
PHYSICAL THERAPY EVALUATION NOTE          Patient Name: Lizeth STEIN Date: 2022      AGE:   80 y o  Mrn:   [id number]  ADMIT DX:  FTT (failure to thrive) in adult [R62 7]    Past Medical History:  Past Medical History:   Diagnosis Date    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    CKD (chronic kidney disease)     HLD (hyperlipidemia)     HTN (hypertension)     Hypothyroidism        Past Surgical History:  History reviewed  No pertinent surgical history  Length Of Stay: 0        PHYSICAL THERAPY EVALUATION:    Patient's identity confirmed via 2 patient identifiers (full name and ) at start of session       22 1520   PT Last Visit   PT Visit Date 22   Note Type   Note type Evaluation   Pain Assessment   Pain Assessment Tool 0-10   Pain Score No Pain   Restrictions/Precautions   Weight Bearing Precautions Per Order No   Other Precautions Cognitive; Chair Alarm; Bed Alarm; Fall Risk   Home Living   Type of Home Apartment  (1st floor apt)   Home Layout One level;Performs ADLs on one level; Able to live on main level with bedroom/bathroom  (0 SHAHEED)   Bathroom Shower/Tub Tub/shower unit   Bathroom Toilet Raised   Bathroom Equipment Shower chair   [facility]  (RW)   Prior Function   Level of [place] Independent with ADLs and functional mobility   Lives With (S)  Alone   Receives Help From Family;Personal care attendant  ([employer] 4days/wk, 4hrs/day, nieces check on pt)   ADL Assistance Independent   IADLs Needs assistance  ((-) driving, family or [employer] complete laundry, cleaning, cooking, grocery shopping)   Falls in the last 6 months 0   Comments Pt reports ambulating w/o AD vs RW at baseline   General   Family/Caregiver Present No   Cognition   Overall Cognitive Status Impaired   Arousal/Participation Cooperative   Orientation Level Oriented to person;Oriented to place;Oriented to time;Disoriented to situation  (oriented to month/year)   Memory Decreased short term memory;Decreased recall of recent events;Decreased recall of precautions   Following Commands Follows one step commands without difficulty   Comments Pt ID via name and ; pt agreeable to PT eval and OOB mobility  Pt very pleasant throughout, decreased safety awareness and short term memory, repeated stories multiple times to this PT throughout eval   Subjective   Subjective "they tell me I'm sick but I don't believe it"   Strength RLE   RLE Overall Strength 3/5  (grossly assessed w/ functional mobility)   Strength LLE   LLE Overall Strength 3/5  (grossly assessed w/ functional mobility)   Light Touch   RLE Light Touch Grossly intact   LLE Light Touch Grossly intact   Bed Mobility   Supine to Sit 3  Moderate assistance   Additional items Assist x 1;HOB elevated; Bedrails; Increased time required;Verbal cues   Sit to Supine Unable to assess   Additional items   (pt OOB in recliner chair at end of session)   Additional Comments pt able to maintain sitting balance at EOB w/ supervision   Transfers   Sit to Stand 4  Minimal assistance   Additional items Assist x 1; Increased time required;Verbal cues   Stand to Sit 4  Minimal assistance   Additional items Assist x 1; Armrests; Increased time required;Verbal cues   Ambulation/Elevation   Gait pattern Improper Weight shift; Forward Flexion; Wide SARAH; Decreased foot clearance; Short stride   Gait Assistance 3  Moderate assist   Additional items Assist x 1;Verbal cues   Assistive Device None  (hand-held assist)   Distance 3'  (to recliner chair)   Ambulation/Elevation Additional Comments pt declined further ambulation/mobility trial at this time as well as use of RW   Balance   Static Sitting Fair   Dynamic Sitting Fair -   Static Standing Poor +   Dynamic Standing Poor   Ambulatory Poor   Endurance Deficit   Endurance Deficit Yes   Endurance Deficit Description limited ambulatory and activity tolerance   Activity Tolerance   Activity Tolerance Patient limited by fatigue   Nurse Made Aware KAMRAN Pruitt   Assessment   Prognosis Fair   Problem List Decreased strength; Impaired balance;Decreased mobility; Decreased cognition; Impaired judgement;Decreased safety awareness  (gait deviations, limited insight into current deficits)   Assessment Lily Hylton is a 80 y o  Female who presents to [facility] on 9/24/2022 and diagnosis of acute encephalopathy  Orders for PT eval and treat received, w/ activity orders of up w/ assist and fall risk precautions  Comorbidities affecting pt at time of evaluation include: R knee osteoarthritis, brain aneurysm, CKD, HTN, anemia  Personal factors affecting DC include: inability to ambulate household distances, inability to navigate level surfaces w/o external assistance, decreased cognition, limited home support, limited insight into impairments and inability to perform IADLs  At baseline, pt mobilizes independently w/o AD vs w/ RW, and w/ 0 fall(s) in the previous 6 months  Upon evaluation, pt presents w/ the following deficits: weakness, edema of extremities, impaired balance and gait deviations  Pt currently requires mod Ax1 for bed mobility, min Ax1 for transfers, and mod Ax1 w/o AD for ambulation  Pt's clinical presentation is unstable/unpredictable due to need for assist w/ all phases of mobility when usually mobilizing independently, tolerance to only 3 feet of ambulation, need for input for mobility technique/safety and recent drastic decline in mobility compared to baseline  Pt is at an increased risk of falls due to impaired balance, impaired cognition, decreased safety awareness, and limited insight into current deficits  From a PT/mobility standpoint, given the above findings, DC recommendation is: Post-acute inpatient rehabilitation   During current admission, pt will benefit from continued skilled inpatient PT in the acute care setting in order to address the above deficits and to maximize function and mobility prior to DC from acute care  Barriers to Discharge Decreased caregiver support  (pt lives alone)   Goals   Patient Goals to go home   STG Expiration Date 10/05/22   Short Term Goal #1 Pt will: perform bed mobility w/ mod I to decrease pt's burden of care and increase pt's independence w/ repositioning in bed; perform transfers w/ mod I to increase pt's OOB mobility; ambulate at least 150' w/ LRAD and mod I to increase pt's ambulatory endurance/tolerance; increase all balance ratings by at least 1 grade to decrease pt's risk of falls   PT Treatment Day 0   Plan   Treatment/Interventions Functional transfer training;LE strengthening/ROM; Therapeutic exercise; Endurance training;Cognitive reorientation;Patient/family training;Equipment eval/education; Bed mobility;Gait training   PT Frequency 3-5x/wk   Recommendation   PT Discharge Recommendation Post acute rehabilitation services   AM-PAC Basic Mobility Inpatient   Turning in Bed Without Bedrails 2   Lying on Back to Sitting on Edge of Flat Bed 2   Moving Bed to Chair 2   Standing Up From Chair 3   Walk in Room 2   Climb 3-5 Stairs 1   Basic Mobility Inpatient Raw Score 12   Basic Mobility Standardized Score 32 23   Highest Level Of Mobility   -[facility] Goal 4: Move to chair/commode   -[facility] Achieved 4: Move to chair/commode   End of Consult   Patient Position at End of Consult Bedside chair;Bed/Chair alarm activated; All needs within reach  (LE elevated and offloaded on pillow; pt educated to use call bell for assistance)       The patient's AM-PAC Basic Mobility Inpatient Short Form Raw Score is 12  A Raw score of less than or equal to 16 suggests the patient may benefit from discharge to post-acute rehabilitation services  Please also refer to the recommendation of the Physical Therapist for safe discharge planning      Pt will benefit from skilled inpatient PT during this admission in order to facilitate progress towards goals and to maximize functional independence prior to DC      DC rec: post acute rehab        Peña FRANKO Burden, DPT  09/25/22

## 2022-09-25 NOTE — ASSESSMENT & PLAN NOTE
· Presented with confusion, generalized weakness, fatigue and bowel, bladder incontinence  · Uncertain etiology; r/o infectious source vs advanced age  · Lab work unremarkable thus far other than for mild anemia  Cr at baseline  TSH within normal limits  · Obtain UA  · Obtain vitamin B12 and folate levels  · Monitor neuro checks  · Fall precautions  · PT/OT eval  Patient resides at home alone  CM consult for possible placement

## 2022-09-25 NOTE — ASSESSMENT & PLAN NOTE
· Presented with confusion, generalized weakness, fatigue and bowel, bladder incontinence  · Possibly d/t UTI   · Lab work unremarkable thus far other than for mild anemia  Cr at baseline  TSH within normal limits      · Monitor neuro checks  · Fall precautions  · PT/OT eval  Patient resides at home alone  CM consult for possible placement

## 2022-09-25 NOTE — PLAN OF CARE
Problem: Prexisting or High Potential for Compromised Skin Integrity  Goal: Skin integrity is maintained or improved  Description: INTERVENTIONS:  - Identify patients at risk for skin breakdown  - Assess and monitor skin integrity  - Assess and monitor nutrition and hydration status  - Monitor labs   - Assess for incontinence   - Turn and reposition patient  - Assist with mobility/ambulation  - Relieve pressure over bony prominences  - Avoid friction and shearing  - Provide appropriate hygiene as needed including keeping skin clean and dry  - Evaluate need for skin moisturizer/barrier cream  - Collaborate with interdisciplinary team   - Patient/family teaching  - Consider wound care consult   Outcome: Progressing     Problem: PAIN - ADULT  Goal: Verbalizes/displays adequate comfort level or baseline comfort level  Description: Interventions:  - Encourage patient to monitor pain and request assistance  - Assess pain using appropriate pain scale  - Administer analgesics based on type and severity of pain and evaluate response  - Implement non-pharmacological measures as appropriate and evaluate response  - Consider cultural and social influences on pain and pain management  - Notify physician/advanced practitioner if interventions unsuccessful or patient reports new pain  Outcome: Progressing     Problem: INFECTION - ADULT  Goal: Absence or prevention of progression during hospitalization  Description: INTERVENTIONS:  - Assess and monitor for signs and symptoms of infection  - Monitor lab/diagnostic results  - Monitor all insertion sites, i e  indwelling lines, tubes, and drains  - Monitor endotracheal if appropriate and nasal secretions for changes in amount and color  - [place] appropriate cooling/warming therapies per order  - Administer medications as ordered  - Instruct and encourage patient and family to use good hand hygiene technique  - Identify and instruct in appropriate isolation precautions for identified infection/condition  Outcome: Progressing  Goal: Absence of fever/infection during neutropenic period  Description: INTERVENTIONS:  - Monitor WBC    Outcome: Progressing     Problem: SAFETY ADULT  Goal: Patient will remain free of falls  Description: INTERVENTIONS:  - Educate patient/family on patient safety including physical limitations  - Instruct patient to call for assistance with activity   - Consult OT/PT to assist with strengthening/mobility   - Keep Call bell within reach  - Keep bed low and locked with side rails adjusted as appropriate  - Keep care items and personal belongings within reach  - Initiate and maintain comfort rounds  - Make Fall Risk Sign visible to staff  - Offer Toileting every 2 Hours, in advance of need  - Initiate/Maintain alarm  - Obtain necessary fall risk management equipment  - Apply yellow socks and bracelet for high fall risk patients  - Consider moving patient to room near nurses station  Outcome: Progressing  Goal: Maintain or return to baseline ADL function  Description: INTERVENTIONS:  -  Assess patient's ability to carry out ADLs; assess patient's baseline for ADL function and identify physical deficits which impact ability to perform ADLs (bathing, care of mouth/teeth, toileting, grooming, dressing, etc )  - Assess/evaluate cause of self-care deficits   - Assess range of motion  - Assess patient's mobility; develop plan if impaired  - Assess patient's need for assistive devices and provide as appropriate  - Encourage maximum independence but intervene and supervise when necessary  - Involve family in performance of ADLs  - Assess for home care needs following discharge   - Consider OT consult to assist with ADL evaluation and planning for discharge  - Provide patient education as appropriate  Outcome: Progressing  Goal: Maintains/Returns to pre admission functional level  Description: INTERVENTIONS:  - Perform BMAT or MOVE assessment daily    - Set and communicate daily mobility goal to care team and patient/family/caregiver  - Collaborate with rehabilitation services on mobility goals if consulted  - Perform Range of Motion 3 times a day  - Reposition patient every 2 hours  - Dangle patient 3 times a day  - Stand patient 3 times a day  - Ambulate patient 3 times a day  - Out of bed to chair 3 times a day   - Out of bed for meals 3 times a day  - Out of bed for toileting  - Record patient progress and toleration of activity level   Outcome: Progressing     Problem: DISCHARGE PLANNING  Goal: Discharge to home or other facility with appropriate resources  Description: INTERVENTIONS:  - Identify barriers to discharge w/patient and caregiver  - Arrange for needed discharge resources and transportation as appropriate  - Identify discharge learning needs (meds, wound care, etc )  - Arrange for interpretive services to assist at discharge as needed  - Refer to Case Management Department for coordinating discharge planning if the patient needs post-hospital services based on physician/advanced practitioner order or complex needs related to functional status, cognitive ability, or social support system  Outcome: Progressing     Problem: Knowledge Deficit  Goal: Patient/family/caregiver demonstrates understanding of disease process, treatment plan, medications, and discharge instructions  Description: Complete learning assessment and assess knowledge base    Interventions:  - Provide teaching at level of understanding  - Provide teaching via preferred learning methods  Outcome: Progressing

## 2022-09-25 NOTE — PROGRESS NOTES
[facility]  Progress Note Joyce Parnell 1/10/1928, 80 y o  female MRN: [id number]  Unit/Bed#: S -01 Encounter: [id number]  Primary Care Provider: Cony Vo MD   Date and time admitted to hospital: 9/24/2022  2:27 PM    * Acute encephalopathy  Assessment & Plan  · Presented with confusion, generalized weakness, fatigue and bowel, bladder incontinence  · Possibly d/t UTI   · Lab work unremarkable thus far other than for mild anemia  Cr at baseline  TSH within normal limits      · Monitor neuro checks  · Fall precautions  · PT/OT eval  Patient resides at home alone  CM consult for possible placement  UTI (urinary tract infection)  Assessment & Plan  · UA with innumerable bacteria and leuks  · Continue ceftriaxone  · F/u Ucx     Anemia  Assessment & Plan  · Hgb 9 7 on presentation with prior Hgb of 10 5 five months ago  · Iron panel c/w CARLOS   · venofer ordered   · Repeat CBC in AM      CKD (chronic kidney disease)  Assessment & Plan  Lab Results   Component Value Date    EGFR 35 09/24/2022    EGFR 34 05/09/2022    CREATININE 1 30 09/24/2022    CREATININE 1 31 (H) 05/09/2022     · Cr at baseline on presentation  · Encourage PO intake as able  Will hold off on IV fluids given LE edema  · Continue to monitor  Hypothyroidism  Assessment & Plan  · Continue levothyroxine  · TSH within normal limits  HLD (hyperlipidemia)  Assessment & Plan  · Continue statin  HTN (hypertension)  Assessment & Plan  · BP low normal since presentation  · Hold amlodipine given lower extremity edema  · Will also hold ARB-HCTZ in AM given poor PO intake the last 2 days  Bilateral lower extremity edema  Assessment & Plan  · Patient with pitting edema of bilateral lower extremities on exam  She notes that her edema has been present and worsening for the past few months  Murmur also noted on exam    · No prior h/o cardiac disease, CHF noted     · R/o cardiac etiology vs due to amlodipine use  · Hold amlodipine  · TTE ordered   · Elevate extremities  · Monitor I&Os and daily weights  VTE Pharmacologic Prophylaxis:   Pharmacologic: Heparin  Mechanical VTE Prophylaxis in Place: Yes    Patient Centered Rounds: I have performed bedside rounds with nursing staff today  Discussions with Specialists or Other Care Team Provider:     Education and Discussions with Family / Patient: patient and nieceValarie     Time Spent for Care: 30 minutes  More than 50% of total time spent on counseling and coordination of care as described above  Current Length of Stay: 0 day(s)    Current Patient Status: Inpatient   Certification Statement: The patient will continue to require additional inpatient hospital stay due to encephalopathy due to UTI  Discharge Plan / Estimated Discharge Date: TBD based on clinical course     Code Status: Level 3 - DNAR and DNI      Subjective:   Patient has no new complaints today  She denies abdominal pain, nausea  No dysuria or suprapubic tenderness  Objective:     Vitals:   Temp (24hrs), Av 5 °F (36 9 °C), Min:98 4 °F (36 9 °C), Max:98 5 °F (36 9 °C)    Temp:  [98 4 °F (36 9 °C)-98 5 °F (36 9 °C)] 98 4 °F (36 9 °C)  HR:  [72-89] 84  Resp:  [16-18] 18  BP: ()/(53-60) 111/53  SpO2:  [95 %-97 %] 95 %  Body mass index is 20 63 kg/m²  Input and Output Summary (last 24 hours):     No intake or output data in the 24 hours ending 22 1511    Physical Exam:     Physical Exam  Vitals and nursing note reviewed  Constitutional:       General: She is not in acute distress  Appearance: Normal appearance  She is not ill-appearing or toxic-appearing  HENT:      Head: Normocephalic and atraumatic  Cardiovascular:      Rate and Rhythm: Normal rate and regular rhythm  Heart sounds: No murmur heard  Pulmonary:      Effort: No respiratory distress  Breath sounds: No stridor  No wheezing or rhonchi     Abdominal: General: Abdomen is flat  There is no distension  Palpations: Abdomen is soft  Tenderness: There is no abdominal tenderness  Musculoskeletal:         General: Swelling present  Neurological:      Mental Status: She is alert  Mental status is at baseline  Psychiatric:         Mood and Affect: Mood normal          Behavior: Behavior normal              Additional Data:     Labs:    Results from last 7 days   Lab Units 09/25/22  1132 09/24/22  1657   WBC Thousand/uL  --  5 96   HEMOGLOBIN g/dL  --  9 7*   HEMATOCRIT %  --  32 7*   PLATELETS Thousands/uL 212 177   NEUTROS PCT %  --  66   LYMPHS PCT %  --  17   MONOS PCT %  --  13*   EOS PCT %  --  2     Results from last 7 days   Lab Units 09/24/22  1657   POTASSIUM mmol/L 3 7   CHLORIDE mmol/L 99   CO2 mmol/L 30   BUN mg/dL 25   CREATININE mg/dL 1 30   CALCIUM mg/dL 8 6   ALK PHOS U/L 42   ALT U/L 6*   AST U/L 14           * I Have Reviewed All Lab Data Listed Above  * Additional Pertinent Lab Tests Reviewed: All Labs Within Last 24 Hours Reviewed    Imaging:    Imaging Reports Reviewed Today Include:   Imaging Personally Reviewed by Myself Includes:      Recent Cultures (last 7 days):           Last 24 Hours Medication List:   Current Facility-Administered Medications   Medication Dose Route Frequency Provider Last Rate    cefTRIAXone  1,000 mg Intravenous Q24H Jenni Pineda PA-C 1,000 mg (09/25/22 0639)    heparin (porcine)  5,000 Units Subcutaneous [facility] Jenni Pineda PA-C      iron sucrose  200 mg Intravenous Daily Eva Torres MD      levothyroxine  25 mcg Oral Early Morning Jenni Pineda PA-C      pravastatin  40 mg Oral Daily With Dinner Jenni Pineda PA-C          Today, Patient Was Seen By: Eva Torres MD    ** Please Note: This note has been constructed using a voice recognition system   **

## 2022-09-25 NOTE — ASSESSMENT & PLAN NOTE
· Patient with pitting edema of bilateral lower extremities on exam  She notes that her edema has been present and worsening for the past few months  Murmur also noted on exam    · No prior h/o cardiac disease, CHF noted  · R/o cardiac etiology vs due to amlodipine use  · Hold amlodipine  · TTE ordered   · Elevate extremities  · Monitor I&Os and daily weights

## 2022-09-25 NOTE — ASSESSMENT & PLAN NOTE
· BP low normal since presentation  · Hold amlodipine given lower extremity edema  · Will also hold ARB-HCTZ in AM given poor PO intake the last 2 days

## 2022-09-25 NOTE — ASSESSMENT & PLAN NOTE
· Hgb 9 7 on presentation with prior Hgb of 10 5 five months ago     · Iron panel c/w CARLOS   · venofer ordered   · Repeat CBC in AM

## 2022-09-25 NOTE — ASSESSMENT & PLAN NOTE
· Hgb 9 7 on presentation with prior Hgb of 10 5 five months ago  · Per patient's niece, no blood noted in stool or urine  · Obtain iron panel     · Repeat CBC in AM

## 2022-09-25 NOTE — ASSESSMENT & PLAN NOTE
· Patient with pitting edema of bilateral lower extremities on exam  She notes that her edema has been present and worsening for the past few months  Murmur also noted on exam    · No prior h/o cardiac disease, CHF noted  · R/o cardiac etiology vs due to amlodipine use  · Hold amlodipine  · Obtain echo  · Elevate extremities  · Monitor I&Os and daily weights

## 2022-09-25 NOTE — ASSESSMENT & PLAN NOTE
Lab Results   Component Value Date    EGFR 35 09/24/2022    EGFR 34 05/09/2022    CREATININE 1 30 09/24/2022    CREATININE 1 31 (H) 05/09/2022     · Cr at baseline on presentation  · Encourage PO intake as able  Will hold off on IV fluids given LE edema  · Continue to monitor

## 2022-09-25 NOTE — PLAN OF CARE
Problem: Prexisting or High Potential for Compromised Skin Integrity  Goal: Skin integrity is maintained or improved  Description: INTERVENTIONS:  - Identify patients at risk for skin breakdown  - Assess and monitor skin integrity  - Assess and monitor nutrition and hydration status  - Monitor labs   - Assess for incontinence   - Turn and reposition patient  - Assist with mobility/ambulation  - Relieve pressure over bony prominences  - Avoid friction and shearing  - Provide appropriate hygiene as needed including keeping skin clean and dry  - Evaluate need for skin moisturizer/barrier cream  - Collaborate with interdisciplinary team   - Patient/family teaching  - Consider wound care consult   Outcome: Progressing     Problem: PAIN - ADULT  Goal: Verbalizes/displays adequate comfort level or baseline comfort level  Description: Interventions:  - Encourage patient to monitor pain and request assistance  - Assess pain using appropriate pain scale  - Administer analgesics based on type and severity of pain and evaluate response  - Implement non-pharmacological measures as appropriate and evaluate response  - Consider cultural and social influences on pain and pain management  - Notify physician/advanced practitioner if interventions unsuccessful or patient reports new pain  Outcome: Progressing     Problem: INFECTION - ADULT  Goal: Absence or prevention of progression during hospitalization  Description: INTERVENTIONS:  - Assess and monitor for signs and symptoms of infection  - Monitor lab/diagnostic results  - Monitor all insertion sites, i e  indwelling lines, tubes, and drains  - Monitor endotracheal if appropriate and nasal secretions for changes in amount and color  - [place] appropriate cooling/warming therapies per order  - Administer medications as ordered  - Instruct and encourage patient and family to use good hand hygiene technique  - Identify and instruct in appropriate isolation precautions for identified infection/condition  Outcome: Progressing     Problem: SAFETY ADULT  Goal: Patient will remain free of falls  Description: INTERVENTIONS:  - Educate patient/family on patient safety including physical limitations  - Instruct patient to call for assistance with activity   - Consult OT/PT to assist with strengthening/mobility   - Keep Call bell within reach  - Keep bed low and locked with side rails adjusted as appropriate  - Keep care items and personal belongings within reach  - Initiate and maintain comfort rounds  - Make Fall Risk Sign visible to staff  - Offer Toileting every 2 Hours, in advance of need  - Initiate/Maintain bed alarm  - Obtain necessary fall risk management equipment: fall cushion   Problem: MOBILITY - ADULT  Goal: Maintain or return to baseline ADL function  Description: INTERVENTIONS:  -  Assess patient's ability to carry out ADLs; assess patient's baseline for ADL function and identify physical deficits which impact ability to perform ADLs (bathing, care of mouth/teeth, toileting, grooming, dressing, etc )  - Assess/evaluate cause of self-care deficits   - Assess range of motion  - Assess patient's mobility; develop plan if impaired  - Assess patient's need for assistive devices and provide as appropriate  - Encourage maximum independence but intervene and supervise when necessary  - Involve family in performance of ADLs  - Assess for home care needs following discharge   - Consider OT consult to assist with ADL evaluation and planning for discharge  - Provide patient education as appropriate  Outcome: Progressing     - Apply yellow socks and bracelet for high fall risk patients  - Consider moving patient to room near nurses station  Outcome: Progressing

## 2022-09-26 LAB
ANION GAP SERPL CALCULATED.3IONS-SCNC: 7 MMOL/L (ref 4–13)
BASOPHILS # BLD AUTO: 0.02 THOUSANDS/ΜL (ref 0–0.1)
BASOPHILS NFR BLD AUTO: 0 % (ref 0–1)
BUN SERPL-MCNC: 21 MG/DL (ref 5–25)
CALCIUM SERPL-MCNC: 8.4 MG/DL (ref 8.4–10.2)
CHLORIDE SERPL-SCNC: 100 MMOL/L (ref 96–108)
CO2 SERPL-SCNC: 30 MMOL/L (ref 21–32)
CREAT SERPL-MCNC: 1.19 MG/DL (ref 0.6–1.3)
EOSINOPHIL # BLD AUTO: 0.03 THOUSAND/ΜL (ref 0–0.61)
EOSINOPHIL NFR BLD AUTO: 1 % (ref 0–6)
ERYTHROCYTE [DISTWIDTH] IN BLOOD BY AUTOMATED COUNT: 15.8 % (ref 11.6–15.1)
GFR SERPL CREATININE-BSD FRML MDRD: 39 ML/MIN/1.73SQ M
GLUCOSE SERPL-MCNC: 79 MG/DL (ref 65–140)
HCT VFR BLD AUTO: 32.1 % (ref 34.8–46.1)
HGB BLD-MCNC: 9.9 G/DL (ref 11.5–15.4)
IMM GRANULOCYTES # BLD AUTO: 0.02 THOUSAND/UL (ref 0–0.2)
IMM GRANULOCYTES NFR BLD AUTO: 0 % (ref 0–2)
LYMPHOCYTES # BLD AUTO: 1.2 THOUSANDS/ΜL (ref 0.6–4.47)
LYMPHOCYTES NFR BLD AUTO: 21 % (ref 14–44)
MCH RBC QN AUTO: 23 PG (ref 26.8–34.3)
MCHC RBC AUTO-ENTMCNC: 30.8 G/DL (ref 31.4–37.4)
MCV RBC AUTO: 75 FL (ref 82–98)
MONOCYTES # BLD AUTO: 0.58 THOUSAND/ΜL (ref 0.17–1.22)
MONOCYTES NFR BLD AUTO: 10 % (ref 4–12)
NEUTROPHILS # BLD AUTO: 3.97 THOUSANDS/ΜL (ref 1.85–7.62)
NEUTS SEG NFR BLD AUTO: 68 % (ref 43–75)
NRBC BLD AUTO-RTO: 0 /100 WBCS
PLATELET # BLD AUTO: 196 THOUSANDS/UL (ref 149–390)
PMV BLD AUTO: 12.5 FL (ref 8.9–12.7)
POTASSIUM SERPL-SCNC: 3.9 MMOL/L (ref 3.5–5.3)
RBC # BLD AUTO: 4.31 MILLION/UL (ref 3.81–5.12)
SODIUM SERPL-SCNC: 137 MMOL/L (ref 135–147)
WBC # BLD AUTO: 5.82 THOUSAND/UL (ref 4.31–10.16)

## 2022-09-26 PROCEDURE — 80048 BASIC METABOLIC PNL TOTAL CA: CPT | Performed by: HOSPITALIST

## 2022-09-26 PROCEDURE — 85025 COMPLETE CBC W/AUTO DIFF WBC: CPT | Performed by: HOSPITALIST

## 2022-09-26 PROCEDURE — 99232 SBSQ HOSP IP/OBS MODERATE 35: CPT | Performed by: HOSPITALIST

## 2022-09-26 RX ORDER — LIDOCAINE 40 MG/G
CREAM TOPICAL DAILY PRN
Status: DISCONTINUED | OUTPATIENT
Start: 2022-09-26 | End: 2022-09-30 | Stop reason: HOSPADM

## 2022-09-26 RX ORDER — XYLITOL/YERBA SANTA
5 AEROSOL, SPRAY WITH PUMP (ML) MUCOUS MEMBRANE 4 TIMES DAILY PRN
Status: DISCONTINUED | OUTPATIENT
Start: 2022-09-26 | End: 2022-09-30 | Stop reason: HOSPADM

## 2022-09-26 RX ORDER — POLYETHYLENE GLYCOL 3350 17 G/17G
17 POWDER, FOR SOLUTION ORAL DAILY PRN
Status: DISCONTINUED | OUTPATIENT
Start: 2022-09-26 | End: 2022-09-30 | Stop reason: HOSPADM

## 2022-09-26 RX ADMIN — Medication 5 SPRAY: at 14:05

## 2022-09-26 RX ADMIN — HEPARIN SODIUM 5000 UNITS: 5000 INJECTION INTRAVENOUS; SUBCUTANEOUS at 14:05

## 2022-09-26 RX ADMIN — CEFTRIAXONE 1000 MG: 1 INJECTION, SOLUTION INTRAVENOUS at 05:30

## 2022-09-26 RX ADMIN — HEPARIN SODIUM 5000 UNITS: 5000 INJECTION INTRAVENOUS; SUBCUTANEOUS at 05:35

## 2022-09-26 RX ADMIN — HEPARIN SODIUM 5000 UNITS: 5000 INJECTION INTRAVENOUS; SUBCUTANEOUS at 21:19

## 2022-09-26 RX ADMIN — PRAVASTATIN SODIUM 40 MG: 40 TABLET ORAL at 16:05

## 2022-09-26 RX ADMIN — IRON SUCROSE 200 MG: 20 INJECTION, SOLUTION INTRAVENOUS at 09:54

## 2022-09-26 RX ADMIN — LEVOTHYROXINE SODIUM 25 MCG: 25 TABLET ORAL at 05:35

## 2022-09-26 NOTE — ASSESSMENT & PLAN NOTE
Presented with confusion, generalized weakness, fatigue and bowel, bladder incontinence  Possibly d/t UTI  Mentation improving alert oriented x3    Plan-   Monitor neuro checks  Fall precautions     PT/OT recommendations  Cm-patient lives alone, family has concerns and is interested in long-term care, after post acute rehabilitation

## 2022-09-26 NOTE — PROGRESS NOTES
[facility]  Progress Note Latesha Hampton 1/10/1928, 80 y o  female MRN: [id number]  Unit/Bed#: S -01 Encounter: [id number]  Primary Care Provider: Jr Hussein MD   Date and time admitted to hospital: 9/24/2022  2:27 PM    * Acute encephalopathy  Assessment & Plan  Presented with confusion, generalized weakness, fatigue and bowel, bladder incontinence  Possibly d/t UTI  Mentation improving alert oriented x3    Plan-   Monitor neuro checks  Fall precautions  PT/OT recommendations  Cm-patient lives alone, family has concerns and is interested in long-term care, after post acute rehabilitation      UTI (urinary tract infection)  Assessment & Plan  · UA with innumerable bacteria and leuks      Plan-  Continue ceftriaxone  Urine culture- pending    Anemia  Assessment & Plan  Hgb 9 7 on presentation with prior Hgb of 10 5 five months ago  Iron panel c/w CARLOS     Plan-  Continue venofer   Monitor CBC  Bowel regimen    Bilateral lower extremity edema  Assessment & Plan  Patient with pitting edema of bilateral lower extremities on exam  She notes that her edema has been present and worsening for the past few months   Murmur also noted on exam    No prior h/o cardiac disease, CHF noted , R/o cardiac etiology vs due to amlodipine use    Upon examination no lower extremity edema noted, improved    Intake/Output Summary (Last 24 hours) at 9/26/2022 1057  Last data filed at 9/26/2022 1045  Gross per 24 hour   Intake 295 ml   Output 836 ml   Net -541 ml       Plan-  Hold amlodipine  Elevate extremities, SCDs in place  Monitor I&Os  Daily weights    HTN (hypertension)  Assessment & Plan  POA- BP low normal since presentation    Plan-  Will also hold ARB-HCTZ in AM given poor PO intake the last few days  Hold amlodipine given lower extremity edema    CKD (chronic kidney disease)  Assessment & Plan  Lab Results   Component Value Date    EGFR 39 09/26/2022    EGFR 35 09/24/2022    EGFR 34 2022    CREATININE 1 19 2022    CREATININE 1 30 2022    CREATININE 1 31 (H) 2022     Cr at baseline on presentation  Encourage PO intake as able  Will hold off on IV fluids given LE edema on presentation   Continue to monitor    Hypothyroidism  Assessment & Plan  Home medication levothyroxine 25  TSH-1 685    Plan-  Continue levothyroxine    HLD (hyperlipidemia)  Assessment & Plan  Home medication simvastatin 20mg    Plan-  Continue pravastatin 40mg        VTE Pharmacologic Prophylaxis: VTE Score: 3 Moderate Risk (Score 3-4) - Pharmacological DVT Prophylaxis Ordered: heparin  Patient Centered Rounds: I performed bedside rounds with nursing staff today  Discussions with Specialists or Other Care Team Provider:  None    Education and Discussions with Family / Patient: Updated  (daughter) via phone  Current Length of Stay: 1 day(s)  Current Patient Status: Inpatient   Discharge Plan: Anticipate discharge in 24-48 hrs to discharge location to be determined pending rehab evaluations  Code Status: Level 3 - DNAR and DNI    Subjective:   Patient was seen examined at bedside  Patient reports she slept well as an nursing staff reported no overnight events  Patient denies history of any complaints  Patient denies chest pain, shortness a breath abdominal pain, nausea, vomiting, diarrhea, fever, chills, or any other symptoms at this time  Objective:     Vitals:   Temp (24hrs), Av 7 °F (37 1 °C), Min:98 2 °F (36 8 °C), Max:99 6 °F (37 6 °C)    Temp:  [98 2 °F (36 8 °C)-99 6 °F (37 6 °C)] 99 6 °F (37 6 °C)  HR:  [81-85] 81  Resp:  [16-18] 16  BP: (117-122)/(56-61) 117/61  SpO2:  [92 %-97 %] 92 %  Body mass index is 21 13 kg/m²  Input and Output Summary (last 24 hours):      Intake/Output Summary (Last 24 hours) at 2022 1107  Last data filed at 2022 1045  Gross per 24 hour   Intake 295 ml   Output 836 ml   Net -541 ml       Physical Exam:   Physical Exam  Vitals and nursing note reviewed  Constitutional:       General: She is not in acute distress  Appearance: She is well-developed  She is not ill-appearing, toxic-appearing or diaphoretic  Comments: thin   HENT:      Head: Normocephalic and atraumatic  Mouth/Throat:      Mouth: Mucous membranes are moist    Eyes:      Extraocular Movements: Extraocular movements intact  Conjunctiva/sclera: Conjunctivae normal       Pupils: Pupils are equal, round, and reactive to light  Cardiovascular:      Rate and Rhythm: Normal rate and regular rhythm  Pulses: Normal pulses  Heart sounds: Normal heart sounds  No murmur heard  Pulmonary:      Effort: Pulmonary effort is normal  No respiratory distress  Breath sounds: Normal breath sounds  No wheezing  Abdominal:      General: Bowel sounds are normal  There is no distension  Palpations: Abdomen is soft  Tenderness: There is no abdominal tenderness  Musculoskeletal:      Cervical back: Neck supple  Right lower leg: No edema  Left lower leg: No edema  Skin:     General: Skin is warm and dry  Neurological:      Mental Status: She is alert and oriented to person, place, and time  Cranial Nerves: No cranial nerve deficit  Motor: No weakness        Coordination: Coordination normal    Psychiatric:         Mood and Affect: Mood normal          Behavior: Behavior normal           Additional Data:     Labs:  Results from last 7 days   Lab Units 09/26/22  0646   WBC Thousand/uL 5 82   HEMOGLOBIN g/dL 9 9*   HEMATOCRIT % 32 1*   PLATELETS Thousands/uL 196   NEUTROS PCT % 68   LYMPHS PCT % 21   MONOS PCT % 10   EOS PCT % 1     Results from last 7 days   Lab Units 09/26/22  0458 09/24/22  1657   SODIUM mmol/L 137 135   POTASSIUM mmol/L 3 9 3 7   CHLORIDE mmol/L 100 99   CO2 mmol/L 30 30   BUN mg/dL 21 25   CREATININE mg/dL 1 19 1 30   ANION GAP mmol/L 7 6   CALCIUM mg/dL 8 4 8 6   ALBUMIN g/dL  --  3 2*   TOTAL BILIRUBIN mg/dL  --  0 50   ALK PHOS U/L  --  42   ALT U/L  --  6*   AST U/L  --  14   GLUCOSE RANDOM mg/dL 79 94                       Lines/Drains:  Invasive Devices  Report    Peripheral Intravenous Line  Duration           Peripheral IV 09/26/22 Left Antecubital <1 day                      Imaging: No pertinent imaging reviewed  Recent Cultures (last 7 days):         Last 24 Hours Medication List:   Current Facility-Administered Medications   Medication Dose Route Frequency Provider Last Rate    cefTRIAXone  1,000 mg Intravenous Q24H Nora Caro PA-C Stopped (09/26/22 0630)    heparin (porcine)  5,000 Units Subcutaneous [facility] Keke Vidal PA-C      iron sucrose  200 mg Intravenous Daily Rusty Askew  mg (09/26/22 0954)    levothyroxine  25 mcg Oral Early Morning Nora Caro PA-C      polyethylene glycol  17 g Oral Daily PRN Seema Cosby MD      pravastatin  40 mg Oral Daily With Dinner Nora Caro PA-C          Today, Patient Was Seen By: Seema Cosby MD    **Please Note: This note may have been constructed using a voice recognition system  **

## 2022-09-26 NOTE — ASSESSMENT & PLAN NOTE
POA- BP low normal since presentation    Plan-  Will also hold ARB-HCTZ in AM given poor PO intake the last few days  Hold amlodipine given lower extremity edema

## 2022-09-26 NOTE — PLAN OF CARE
Problem: Prexisting or High Potential for Compromised Skin Integrity  Goal: Skin integrity is maintained or improved  Description: INTERVENTIONS:  - Identify patients at risk for skin breakdown  - Assess and monitor skin integrity  - Assess and monitor nutrition and hydration status  - Monitor labs   - Assess for incontinence   - Turn and reposition patient  - Assist with mobility/ambulation  - Relieve pressure over bony prominences  - Avoid friction and shearing  - Provide appropriate hygiene as needed including keeping skin clean and dry  - Evaluate need for skin moisturizer/barrier cream  - Collaborate with interdisciplinary team   - Patient/family teaching  - Consider wound care consult   Outcome: Progressing     Problem: PAIN - ADULT  Goal: Verbalizes/displays adequate comfort level or baseline comfort level  Description: Interventions:  - Encourage patient to monitor pain and request assistance  - Assess pain using appropriate pain scale  - Administer analgesics based on type and severity of pain and evaluate response  - Implement non-pharmacological measures as appropriate and evaluate response  - Consider cultural and social influences on pain and pain management  - Notify physician/advanced practitioner if interventions unsuccessful or patient reports new pain  Outcome: Progressing     Problem: INFECTION - ADULT  Goal: Absence or prevention of progression during hospitalization  Description: INTERVENTIONS:  - Assess and monitor for signs and symptoms of infection  - Monitor lab/diagnostic results  - Monitor all insertion sites, i e  indwelling lines, tubes, and drains  - Monitor endotracheal if appropriate and nasal secretions for changes in amount and color  - [place] appropriate cooling/warming therapies per order  - Administer medications as ordered  - Instruct and encourage patient and family to use good hand hygiene technique  - Identify and instruct in appropriate isolation precautions for identified infection/condition  Outcome: Progressing  Goal: Absence of fever/infection during neutropenic period  Description: INTERVENTIONS:  - Monitor WBC    Outcome: Progressing     Problem: SAFETY ADULT  Goal: Patient will remain free of falls  Description: INTERVENTIONS:  - Educate patient/family on patient safety including physical limitations  - Instruct patient to call for assistance with activity   - Consult OT/PT to assist with strengthening/mobility   - Keep Call bell within reach  - Keep bed low and locked with side rails adjusted as appropriate  - Keep care items and personal belongings within reach  - Initiate and maintain comfort rounds  - Make Fall Risk Sign visible to staff  - Offer Toileting every 2 Hours, in advance of need  - Initiate/Maintain bed alarm  - Obtain necessary fall risk management equipment  - Apply yellow socks and bracelet for high fall risk patients  - Consider moving patient to room near nurses station  Outcome: Progressing  Goal: Maintain or return to baseline ADL function  Description: INTERVENTIONS:  -  Assess patient's ability to carry out ADLs; assess patient's baseline for ADL function and identify physical deficits which impact ability to perform ADLs (bathing, care of mouth/teeth, toileting, grooming, dressing, etc )  - Assess/evaluate cause of self-care deficits   - Assess range of motion  - Assess patient's mobility; develop plan if impaired  - Assess patient's need for assistive devices and provide as appropriate  - Encourage maximum independence but intervene and supervise when necessary  - Involve family in performance of ADLs  - Assess for home care needs following discharge   - Consider OT consult to assist with ADL evaluation and planning for discharge  - Provide patient education as appropriate  Outcome: Progressing  Goal: Maintains/Returns to pre admission functional level  Description: INTERVENTIONS:  - Perform BMAT or MOVE assessment daily    - Set and communicate daily mobility goal to care team and patient/family/caregiver  - Collaborate with rehabilitation services on mobility goals if consulted  - Perform Range of Motion 2 times a day  - Reposition patient every 2 hours  - Dangle patient 2 times a day  - Stand patient 2 times a day  - Ambulate patient 3 times a day  - Out of bed to chair 3 times a day   - Out of bed for meals 3 times a day  - Out of bed for toileting  - Record patient progress and toleration of activity level   Outcome: Progressing     Problem: DISCHARGE PLANNING  Goal: Discharge to home or other facility with appropriate resources  Description: INTERVENTIONS:  - Identify barriers to discharge w/patient and caregiver  - Arrange for needed discharge resources and transportation as appropriate  - Identify discharge learning needs (meds, wound care, etc )  - Arrange for interpretive services to assist at discharge as needed  - Refer to Case Management Department for coordinating discharge planning if the patient needs post-hospital services based on physician/advanced practitioner order or complex needs related to functional status, cognitive ability, or social support system  Outcome: Progressing     Problem: Knowledge Deficit  Goal: Patient/family/caregiver demonstrates understanding of disease process, treatment plan, medications, and discharge instructions  Description: Complete learning assessment and assess knowledge base    Interventions:  - Provide teaching at level of understanding  - Provide teaching via preferred learning methods  Outcome: Progressing     Problem: Potential for Falls  Goal: Patient will remain free of falls  Description: INTERVENTIONS:  - Educate patient/family on patient safety including physical limitations  - Instruct patient to call for assistance with activity   - Consult OT/PT to assist with strengthening/mobility   - Keep Call bell within reach  - Keep bed low and locked with side rails adjusted as appropriate  - Keep care items and personal belongings within reach  - Initiate and maintain comfort rounds  - Make Fall Risk Sign visible to staff  - Offer Toileting every 2 Hours, in advance of need  - Initiate/Maintain bed alarm  - Obtain necessary fall risk management equipment  - Apply yellow socks and bracelet for high fall risk patients  - Consider moving patient to room near nurses station  Outcome: Progressing     Problem: MOBILITY - ADULT  Goal: Maintain or return to baseline ADL function  Description: INTERVENTIONS:  -  Assess patient's ability to carry out ADLs; assess patient's baseline for ADL function and identify physical deficits which impact ability to perform ADLs (bathing, care of mouth/teeth, toileting, grooming, dressing, etc )  - Assess/evaluate cause of self-care deficits   - Assess range of motion  - Assess patient's mobility; develop plan if impaired  - Assess patient's need for assistive devices and provide as appropriate  - Encourage maximum independence but intervene and supervise when necessary  - Involve family in performance of ADLs  - Assess for home care needs following discharge   - Consider OT consult to assist with ADL evaluation and planning for discharge  - Provide patient education as appropriate  Outcome: Progressing  Goal: Maintains/Returns to pre admission functional level  Description: INTERVENTIONS:  - Perform BMAT or MOVE assessment daily    - Set and communicate daily mobility goal to care team and patient/family/caregiver  - Collaborate with rehabilitation services on mobility goals if consulted  - Perform Range of Motion 2 times a day  - Reposition patient every 2 hours    - Dangle patient 2 times a day  - Stand patient 2 times a day  - Ambulate patient 3 times a day  - Out of bed to chair 3 times a day   - Out of bed for meals 3 times a day  - Out of bed for toileting  - Record patient progress and toleration of activity level   Outcome: Progressing

## 2022-09-26 NOTE — CASE MANAGEMENT
Case Management Assessment & Discharge Planning Note    Patient name Sol Tam  Location S /S -72 MRN [id number]  : 1/10/1928 Date 2022       Current Admission Date: 2022  Current Admission Diagnosis:Acute encephalopathy   Patient Active Problem List    Diagnosis Date Noted    UTI (urinary tract infection) 2022    HLD (hyperlipidemia) 2022    Hypothyroidism 2022    CKD (chronic kidney disease) 2022    HTN (hypertension) 2022    Acute encephalopathy 2022    Anemia 2022    Bilateral lower extremity edema 2022      LOS (days): 1  Geometric Mean LOS (GMLOS) (days): 3 30  Days to GMLOS:2 2     OBJECTIVE:    Risk of Unplanned Readmission Score: 12 19         Current admission status: Inpatient       [facility]  [address] [address]  Phone: [phone] Fax: [phone]    Primary Care Provider: Melina Azar MD    Primary Insurance: MEDICARE  Secondary Insurance: BLUE CROSS    ASSESSMENT:  Active Health Care Proxies    There are no active Health Care Proxies on file  Readmission Root Cause  30 Day Readmission: No    Patient Information  Admitted from[de-identified] Home  Mental Status: Confused (AOx2)  During Assessment patient was accompanied by: Not accompanied during assessment  Assessment information provided by[de-identified] Other - please comment (Niece - Izell Arrow)  Primary Caregiver: Private caregiver  Caregiver's Name[de-identified] 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 - had 12 hours per week to assist with showering  Support Systems: Self, Family members  [place] of Residence: [address] [place] [address] do you live in?: [place] entry access options   Select all that apply : No steps to enter home  Type of Current Residence: Apartment  Floor Level: 1  Upon entering residence, is there a bedroom on the main floor (no further steps)?: Yes  Upon entering residence, is there a bathroom on the main floor (no further steps)?: Yes  Living Arrangements: Lives Alone    Activities of Daily Living Prior to Admission  Functional Status: Independent  Completes ADLs independently?: No  Level of ADL dependence: Assistance (able to dress self  Needs supervision/assist with showering)  Ambulates independently?: Yes  Does patient use assisted devices?: No  Does patient currently own DME?: Yes  What DME does the patient currently own?: Roseanne Bahena  Does patient have a history of Outpatient Therapy (PT/OT)?: No  Does the patient have a history of Short-Term Rehab?: Yes (Many years ago at [facility])  Does patient have a history of HHC?: Yes  Does patient currently have [facility]?: No    Patient Information Continued  Income Source: Pension/halfway  Does patient have prescription coverage?: Yes  Does patient receive dialysis treatments?: No  Does patient have a history of substance abuse?: No  Does patient have a history of Mental Health Diagnosis?: No    Means of Transportation  Means of Transport to Appts[de-identified] Family transport  In the past 12 months, has lack of transportation kept you from medical appointments or from getting medications?: No  In the past 12 months, has lack of transportation kept you from meetings, work, or from getting things needed for daily living?: No    DISCHARGE DETAILS:    Discharge planning discussed with[de-identified] Cecil Madrid  Freedom of Choice: Yes  Comments - Freedom of Choice: [place] STR referrals    Contacts  Patient Contacts: Cecil madrid  Relationship to Patient[de-identified] Family  Contact Method: Phone  Reason/Outcome: Continuity of Care, Discharge Planning, Emergency Contact    Other Referral/Resources/Interventions Provided:  Interventions: Short Term Rehab  Referral Comments: Therapy recs for STR  CM spoke with pt Cecil madrid  CM introduced self/role with dcp  Cecil Hilario aware that pt will need STR and agreeable to blanket referrals   As per Cecil Hilario her sister, Dimitrios Granados, is pt POA however, Marge Weldon handles medical and [employer]  As per Adan Mandi is currently on vacation but she will be in contact with her to update her on her discussion with CM  As per Marge Weldon the pt is vaccinated for COVID with first booster  Marge Weldno reports that they are open to pt recieving the booster prior to d/c  Marge Weldon reports that they are considering LTC for pt but idealy would love for pt to return home with continued seniors helping seniors HHA support  Marge Weldon aware that pt progress in rehab will be pending her d/c plan from there  CM did send referrals via [employer]      Treatment Team Recommendation: Short Term Rehab  Discharge Destination Plan[de-identified] Short Term Rehab

## 2022-09-26 NOTE — PLAN OF CARE
Problem: Prexisting or High Potential for Compromised Skin Integrity  Goal: Skin integrity is maintained or improved  Description: INTERVENTIONS:  - Identify patients at risk for skin breakdown  - Assess and monitor skin integrity  - Assess and monitor nutrition and hydration status  - Monitor labs   - Assess for incontinence   - Turn and reposition patient  - Assist with mobility/ambulation  - Relieve pressure over bony prominences  - Avoid friction and shearing  - Provide appropriate hygiene as needed including keeping skin clean and dry  - Evaluate need for skin moisturizer/barrier cream  - Collaborate with interdisciplinary team   - Patient/family teaching  - Consider wound care consult   Outcome: Progressing     Problem: PAIN - ADULT  Goal: Verbalizes/displays adequate comfort level or baseline comfort level  Description: Interventions:  - Encourage patient to monitor pain and request assistance  - Assess pain using appropriate pain scale  - Administer analgesics based on type and severity of pain and evaluate response  - Implement non-pharmacological measures as appropriate and evaluate response  - Consider cultural and social influences on pain and pain management  - Notify physician/advanced practitioner if interventions unsuccessful or patient reports new pain  Outcome: Progressing     Problem: INFECTION - ADULT  Goal: Absence or prevention of progression during hospitalization  Description: INTERVENTIONS:  - Assess and monitor for signs and symptoms of infection  - Monitor lab/diagnostic results  - Monitor all insertion sites, i e  indwelling lines, tubes, and drains  - Monitor endotracheal if appropriate and nasal secretions for changes in amount and color  - [place] appropriate cooling/warming therapies per order  - Administer medications as ordered  - Instruct and encourage patient and family to use good hand hygiene technique  - Identify and instruct in appropriate isolation precautions for identified infection/condition  Outcome: Progressing  Goal: Absence of fever/infection during neutropenic period  Description: INTERVENTIONS:  - Monitor WBC    Outcome: Progressing     Problem: SAFETY ADULT  Goal: Patient will remain free of falls  Description: INTERVENTIONS:  - Educate patient/family on patient safety including physical limitations  - Instruct patient to call for assistance with activity   - Consult OT/PT to assist with strengthening/mobility   - Keep Call bell within reach  - Keep bed low and locked with side rails adjusted as appropriate  - Keep care items and personal belongings within reach  - Initiate and maintain comfort rounds  - Make Fall Risk Sign visible to staff  - Offer Toileting every 2 Hours, in advance of need  - Initiate/Maintain bed alarm  - Obtain necessary fall risk management equipment  - Apply yellow socks and bracelet for high fall risk patients  - Consider moving patient to room near nurses station  Outcome: Progressing  Goal: Maintain or return to baseline ADL function  Description: INTERVENTIONS:  -  Assess patient's ability to carry out ADLs; assess patient's baseline for ADL function and identify physical deficits which impact ability to perform ADLs (bathing, care of mouth/teeth, toileting, grooming, dressing, etc )  - Assess/evaluate cause of self-care deficits   - Assess range of motion  - Assess patient's mobility; develop plan if impaired  - Assess patient's need for assistive devices and provide as appropriate  - Encourage maximum independence but intervene and supervise when necessary  - Involve family in performance of ADLs  - Assess for home care needs following discharge   - Consider OT consult to assist with ADL evaluation and planning for discharge  - Provide patient education as appropriate  Outcome: Progressing  Goal: Maintains/Returns to pre admission functional level  Description: INTERVENTIONS:  - Perform BMAT or MOVE assessment daily    - Set and communicate daily mobility goal to care team and patient/family/caregiver  - Collaborate with rehabilitation services on mobility goals if consulted  - Perform Range of Motion 2 times a day  - Reposition patient every 2 hours  - Dangle patient 2 times a day  - Stand patient 2 times a day  - Ambulate patient 3 times a day  - Out of bed to chair 3 times a day   - Out of bed for meals 3 times a day  - Out of bed for toileting  - Record patient progress and toleration of activity level   Outcome: Progressing     Problem: DISCHARGE PLANNING  Goal: Discharge to home or other facility with appropriate resources  Description: INTERVENTIONS:  - Identify barriers to discharge w/patient and caregiver  - Arrange for needed discharge resources and transportation as appropriate  - Identify discharge learning needs (meds, wound care, etc )  - Arrange for interpretive services to assist at discharge as needed  - Refer to Case Management Department for coordinating discharge planning if the patient needs post-hospital services based on physician/advanced practitioner order or complex needs related to functional status, cognitive ability, or social support system  Outcome: Progressing     Problem: Knowledge Deficit  Goal: Patient/family/caregiver demonstrates understanding of disease process, treatment plan, medications, and discharge instructions  Description: Complete learning assessment and assess knowledge base    Interventions:  - Provide teaching at level of understanding  - Provide teaching via preferred learning methods  Outcome: Progressing     Problem: Potential for Falls  Goal: Patient will remain free of falls  Description: INTERVENTIONS:  - Educate patient/family on patient safety including physical limitations  - Instruct patient to call for assistance with activity   - Consult OT/PT to assist with strengthening/mobility   - Keep Call bell within reach  - Keep bed low and locked with side rails adjusted as appropriate  - Keep care items and personal belongings within reach  - Initiate and maintain comfort rounds  - Make Fall Risk Sign visible to staff  - Offer Toileting every 2 Hours, in advance of need  - Initiate/Maintain bed alarm  - Obtain necessary fall risk management equipment  - Apply yellow socks and bracelet for high fall risk patients  - Consider moving patient to room near nurses station  Outcome: Progressing     Problem: MOBILITY - ADULT  Goal: Maintain or return to baseline ADL function  Description: INTERVENTIONS:  -  Assess patient's ability to carry out ADLs; assess patient's baseline for ADL function and identify physical deficits which impact ability to perform ADLs (bathing, care of mouth/teeth, toileting, grooming, dressing, etc )  - Assess/evaluate cause of self-care deficits   - Assess range of motion  - Assess patient's mobility; develop plan if impaired  - Assess patient's need for assistive devices and provide as appropriate  - Encourage maximum independence but intervene and supervise when necessary  - Involve family in performance of ADLs  - Assess for home care needs following discharge   - Consider OT consult to assist with ADL evaluation and planning for discharge  - Provide patient education as appropriate  Outcome: Progressing  Goal: Maintains/Returns to pre admission functional level  Description: INTERVENTIONS:  - Perform BMAT or MOVE assessment daily    - Set and communicate daily mobility goal to care team and patient/family/caregiver  - Collaborate with rehabilitation services on mobility goals if consulted  - Perform Range of Motion 2 times a day  - Reposition patient every 2 hours    - Dangle patient 2 times a day  - Stand patient 2 times a day  - Ambulate patient 3 times a day  - Out of bed to chair 3 times a day   - Out of bed for meals 3 times a day  - Out of bed for toileting  - Record patient progress and toleration of activity level   Outcome: Progressing

## 2022-09-26 NOTE — ASSESSMENT & PLAN NOTE
Hgb 9 7 on presentation with prior Hgb of 10 5 five months ago  Iron panel c/w CARLOS     Plan-  Continue venofer   Monitor CBC  Bowel regimen

## 2022-09-26 NOTE — ASSESSMENT & PLAN NOTE
Lab Results   Component Value Date    EGFR 39 09/26/2022    EGFR 35 09/24/2022    EGFR 34 05/09/2022    CREATININE 1 19 09/26/2022    CREATININE 1 30 09/24/2022    CREATININE 1 31 (H) 05/09/2022     Cr at baseline on presentation  Encourage PO intake as able   Will hold off on IV fluids given LE edema on presentation   Continue to monitor

## 2022-09-26 NOTE — PLAN OF CARE
Problem: Prexisting or High Potential for Compromised Skin Integrity  Goal: Skin integrity is maintained or improved  Description: INTERVENTIONS:  - Identify patients at risk for skin breakdown  - Assess and monitor skin integrity  - Assess and monitor nutrition and hydration status  - Monitor labs   - Assess for incontinence   - Turn and reposition patient  - Assist with mobility/ambulation  - Relieve pressure over bony prominences  - Avoid friction and shearing  - Provide appropriate hygiene as needed including keeping skin clean and dry  - Evaluate need for skin moisturizer/barrier cream  - Collaborate with interdisciplinary team   - Patient/family teaching  - Consider wound care consult   Outcome: Progressing     Problem: PAIN - ADULT  Goal: Verbalizes/displays adequate comfort level or baseline comfort level  Description: Interventions:  - Encourage patient to monitor pain and request assistance  - Assess pain using appropriate pain scale  - Administer analgesics based on type and severity of pain and evaluate response  - Implement non-pharmacological measures as appropriate and evaluate response  - Consider cultural and social influences on pain and pain management  - Notify physician/advanced practitioner if interventions unsuccessful or patient reports new pain  Outcome: Progressing     Problem: INFECTION - ADULT  Goal: Absence or prevention of progression during hospitalization  Description: INTERVENTIONS:  - Assess and monitor for signs and symptoms of infection  - Monitor lab/diagnostic results  - Monitor all insertion sites, i e  indwelling lines, tubes, and drains  - Monitor endotracheal if appropriate and nasal secretions for changes in amount and color  - [place] appropriate cooling/warming therapies per order  - Administer medications as ordered  - Instruct and encourage patient and family to use good hand hygiene technique  - Identify and instruct in appropriate isolation precautions for identified infection/condition  Outcome: Progressing  Goal: Absence of fever/infection during neutropenic period  Description: INTERVENTIONS:  - Monitor WBC    Outcome: Progressing     Problem: SAFETY ADULT  Goal: Patient will remain free of falls  Description: INTERVENTIONS:  - Educate patient/family on patient safety including physical limitations  - Instruct patient to call for assistance with activity   - Consult OT/PT to assist with strengthening/mobility   - Keep Call bell within reach  - Keep bed low and locked with side rails adjusted as appropriate  - Keep care items and personal belongings within reach  - Initiate and maintain comfort rounds  - Make Fall Risk Sign visible to staff  - Apply yellow socks and bracelet for high fall risk patients  - Consider moving patient to room near nurses station  Outcome: Progressing  Goal: Maintain or return to baseline ADL function  Description: INTERVENTIONS:  -  Assess patient's ability to carry out ADLs; assess patient's baseline for ADL function and identify physical deficits which impact ability to perform ADLs (bathing, care of mouth/teeth, toileting, grooming, dressing, etc )  - Assess/evaluate cause of self-care deficits   - Assess range of motion  - Assess patient's mobility; develop plan if impaired  - Assess patient's need for assistive devices and provide as appropriate  - Encourage maximum independence but intervene and supervise when necessary  - Involve family in performance of ADLs  - Assess for home care needs following discharge   - Consider OT consult to assist with ADL evaluation and planning for discharge  - Provide patient education as appropriate  Outcome: Progressing  Goal: Maintains/Returns to pre admission functional level  Description: INTERVENTIONS:  - Perform BMAT or MOVE assessment daily    - Set and communicate daily mobility goal to care team and patient/family/caregiver     - Collaborate with rehabilitation services on mobility goals if consulted  - Perform Range of Motion  - Reposition patient  - Dangle patient   - Stand patient   - Ambulate patient   - Out of bed to chair   - Out of bed for meals  - Out of bed for toileting  - Record patient progress and toleration of activity level   Outcome: Progressing     Problem: DISCHARGE PLANNING  Goal: Discharge to home or other facility with appropriate resources  Description: INTERVENTIONS:  - Identify barriers to discharge w/patient and caregiver  - Arrange for needed discharge resources and transportation as appropriate  - Identify discharge learning needs (meds, wound care, etc )  - Arrange for interpretive services to assist at discharge as needed  - Refer to Case Management Department for coordinating discharge planning if the patient needs post-hospital services based on physician/advanced practitioner order or complex needs related to functional status, cognitive ability, or social support system  Outcome: Progressing     Problem: Knowledge Deficit  Goal: Patient/family/caregiver demonstrates understanding of disease process, treatment plan, medications, and discharge instructions  Description: Complete learning assessment and assess knowledge base    Interventions:  - Provide teaching at level of understanding  - Provide teaching via preferred learning methods  Outcome: Progressing     Problem: Potential for Falls  Goal: Patient will remain free of falls  Description: INTERVENTIONS:  - Educate patient/family on patient safety including physical limitations  - Instruct patient to call for assistance with activity   - Consult OT/PT to assist with strengthening/mobility   - Keep Call bell within reach  - Keep bed low and locked with side rails adjusted as appropriate  - Keep care items and personal belongings within reach  - Initiate and maintain comfort rounds  - Make Fall Risk Sign visible to staff  - Apply yellow socks and bracelet for high fall risk patients  - Consider moving patient to room near nurses station  Outcome: Progressing     Problem: MOBILITY - ADULT  Goal: Maintain or return to baseline ADL function  Description: INTERVENTIONS:  -  Assess patient's ability to carry out ADLs; assess patient's baseline for ADL function and identify physical deficits which impact ability to perform ADLs (bathing, care of mouth/teeth, toileting, grooming, dressing, etc )  - Assess/evaluate cause of self-care deficits   - Assess range of motion  - Assess patient's mobility; develop plan if impaired  - Assess patient's need for assistive devices and provide as appropriate  - Encourage maximum independence but intervene and supervise when necessary  - Involve family in performance of ADLs  - Assess for home care needs following discharge   - Consider OT consult to assist with ADL evaluation and planning for discharge  - Provide patient education as appropriate  Outcome: Progressing  Goal: Maintains/Returns to pre admission functional level  Description: INTERVENTIONS:  - Perform BMAT or MOVE assessment daily    - Set and communicate daily mobility goal to care team and patient/family/caregiver     - Collaborate with rehabilitation services on mobility goals if consulted  - Perform Range of Motion   - Reposition patient   - Dangle patient   - Stand patient   - Ambulate patient   - Out of bed to chair   - Out of bed for meals  - Out of bed for toileting  - Record patient progress and toleration of activity level   Outcome: Progressing

## 2022-09-26 NOTE — ASSESSMENT & PLAN NOTE
Patient with pitting edema of bilateral lower extremities on exam  She notes that her edema has been present and worsening for the past few months   Murmur also noted on exam    No prior h/o cardiac disease, CHF noted , R/o cardiac etiology vs due to amlodipine use    Upon examination no lower extremity edema noted, improved    Intake/Output Summary (Last 24 hours) at 9/26/2022 1057  Last data filed at 9/26/2022 1045  Gross per 24 hour   Intake 295 ml   Output 836 ml   Net -541 ml       Plan-  Hold amlodipine  Elevate extremities, SCDs in place  Monitor I&Os  Daily weights

## 2022-09-27 ENCOUNTER — APPOINTMENT (INPATIENT)
Dept: CT IMAGING | Facility: HOSPITAL | Age: 87
DRG: 689 | End: 2022-09-27
Payer: MEDICARE

## 2022-09-27 LAB
ANION GAP SERPL CALCULATED.3IONS-SCNC: 7 MMOL/L (ref 4–13)
BACTERIA UR CULT: ABNORMAL
BASOPHILS # BLD AUTO: 0.02 THOUSANDS/ΜL (ref 0–0.1)
BASOPHILS NFR BLD AUTO: 0 % (ref 0–1)
BUN SERPL-MCNC: 19 MG/DL (ref 5–25)
CALCIUM SERPL-MCNC: 8.1 MG/DL (ref 8.4–10.2)
CHLORIDE SERPL-SCNC: 102 MMOL/L (ref 96–108)
CO2 SERPL-SCNC: 28 MMOL/L (ref 21–32)
CREAT SERPL-MCNC: 1.06 MG/DL (ref 0.6–1.3)
EOSINOPHIL # BLD AUTO: 0.06 THOUSAND/ΜL (ref 0–0.61)
EOSINOPHIL NFR BLD AUTO: 1 % (ref 0–6)
ERYTHROCYTE [DISTWIDTH] IN BLOOD BY AUTOMATED COUNT: 15.9 % (ref 11.6–15.1)
FLUAV RNA RESP QL NAA+PROBE: NEGATIVE
FLUBV RNA RESP QL NAA+PROBE: NEGATIVE
GFR SERPL CREATININE-BSD FRML MDRD: 45 ML/MIN/1.73SQ M
GLUCOSE SERPL-MCNC: 81 MG/DL (ref 65–140)
HCT VFR BLD AUTO: 32.5 % (ref 34.8–46.1)
HGB BLD-MCNC: 9.8 G/DL (ref 11.5–15.4)
IMM GRANULOCYTES # BLD AUTO: 0.02 THOUSAND/UL (ref 0–0.2)
IMM GRANULOCYTES NFR BLD AUTO: 0 % (ref 0–2)
LYMPHOCYTES # BLD AUTO: 1.54 THOUSANDS/ΜL (ref 0.6–4.47)
LYMPHOCYTES NFR BLD AUTO: 34 % (ref 14–44)
MCH RBC QN AUTO: 22.8 PG (ref 26.8–34.3)
MCHC RBC AUTO-ENTMCNC: 30.2 G/DL (ref 31.4–37.4)
MCV RBC AUTO: 76 FL (ref 82–98)
MONOCYTES # BLD AUTO: 0.63 THOUSAND/ΜL (ref 0.17–1.22)
MONOCYTES NFR BLD AUTO: 14 % (ref 4–12)
NEUTROPHILS # BLD AUTO: 2.23 THOUSANDS/ΜL (ref 1.85–7.62)
NEUTS SEG NFR BLD AUTO: 51 % (ref 43–75)
NRBC BLD AUTO-RTO: 0 /100 WBCS
PLATELET # BLD AUTO: 191 THOUSANDS/UL (ref 149–390)
PMV BLD AUTO: 12.7 FL (ref 8.9–12.7)
POTASSIUM SERPL-SCNC: 3.7 MMOL/L (ref 3.5–5.3)
RBC # BLD AUTO: 4.3 MILLION/UL (ref 3.81–5.12)
RSV RNA RESP QL NAA+PROBE: NEGATIVE
SARS-COV-2 RNA RESP QL NAA+PROBE: POSITIVE
SODIUM SERPL-SCNC: 137 MMOL/L (ref 135–147)
WBC # BLD AUTO: 4.5 THOUSAND/UL (ref 4.31–10.16)

## 2022-09-27 PROCEDURE — 97530 THERAPEUTIC ACTIVITIES: CPT

## 2022-09-27 PROCEDURE — 70450 CT HEAD/BRAIN W/O DYE: CPT

## 2022-09-27 PROCEDURE — 80048 BASIC METABOLIC PNL TOTAL CA: CPT | Performed by: HOSPITALIST

## 2022-09-27 PROCEDURE — 0241U HB NFCT DS VIR RESP RNA 4 TRGT: CPT

## 2022-09-27 PROCEDURE — 85025 COMPLETE CBC W/AUTO DIFF WBC: CPT | Performed by: HOSPITALIST

## 2022-09-27 PROCEDURE — 99232 SBSQ HOSP IP/OBS MODERATE 35: CPT | Performed by: INTERNAL MEDICINE

## 2022-09-27 PROCEDURE — 97116 GAIT TRAINING THERAPY: CPT

## 2022-09-27 PROCEDURE — 97167 OT EVAL HIGH COMPLEX 60 MIN: CPT

## 2022-09-27 RX ADMIN — HEPARIN SODIUM 5000 UNITS: 5000 INJECTION INTRAVENOUS; SUBCUTANEOUS at 05:00

## 2022-09-27 RX ADMIN — IRON SUCROSE 200 MG: 20 INJECTION, SOLUTION INTRAVENOUS at 08:14

## 2022-09-27 RX ADMIN — LEVOTHYROXINE SODIUM 25 MCG: 25 TABLET ORAL at 05:00

## 2022-09-27 RX ADMIN — HEPARIN SODIUM 5000 UNITS: 5000 INJECTION INTRAVENOUS; SUBCUTANEOUS at 21:09

## 2022-09-27 RX ADMIN — HEPARIN SODIUM 5000 UNITS: 5000 INJECTION INTRAVENOUS; SUBCUTANEOUS at 14:09

## 2022-09-27 RX ADMIN — PRAVASTATIN SODIUM 40 MG: 40 TABLET ORAL at 19:00

## 2022-09-27 RX ADMIN — CEFTRIAXONE 1000 MG: 1 INJECTION, SOLUTION INTRAVENOUS at 04:55

## 2022-09-27 NOTE — ASSESSMENT & PLAN NOTE
Patient with pitting edema of bilateral lower extremities on exam  She notes that her edema has been present and worsening for the past few months   Murmur also noted on exam    No prior h/o cardiac disease, CHF noted , R/o cardiac etiology vs due to amlodipine use    Upon examination no lower extremity edema noted, improved    Intake/Output Summary (Last 24 hours) at 9/27/2022 1443  Last data filed at 9/27/2022 0701  Gross per 24 hour   Intake --   Output 900 ml   Net -900 ml       Plan-  Hold amlodipine  Elevate extremities, SCDs in place  Monitor I&Os  Daily weights

## 2022-09-27 NOTE — CASE MANAGEMENT
Case Management Progress Note    Patient name Phyllis Brown  Location S /S -18 MRN [id number]  : 1/10/1928 Date 2022       LOS (days): 2  Geometric Mean LOS (GMLOS) (days): 3 30  Days to GMLOS:1 3        OBJECTIVE:        Current admission status: Inpatient  Preferred Pharmacy:   [facility] [facility] [address] [place] [id number]  Phone: [phone] Fax: [phone]    Primary Care Provider: Dalton Weathers MD    Primary Insurance: MEDICARE  Secondary Insurance: BLUE CROSS    PROGRESS NOTE:    CM notified by [facility] that they are able to accept the patient for STR  Will require a COVID test prior to admission  - CM notified [facility]  CM spoke with pt niece, Tim Barefoot, to confirm COVID booster  Tim Barefoot did speak with Alma Winslow and they are ok with pt getting the booster  Tim Barefoot confirmed pt did only have the first three which are noted on the chart  CM did update the nurse  Keke aware that [facility] is able to accept  Tim Barefoot very happy with this information and will notify the pt  Keke aware CM will keep her updated on d/c

## 2022-09-27 NOTE — PHYSICAL THERAPY NOTE
PHYSICAL THERAPY TREATMENT NOTE  NAME:  Corrina Morrison  DATE: 09/27/22    Length Of Stay: 2  Performed at least 2 patient identifiers during session: Name and Birthday    TREATMENT:    09/27/22 1546   PT Last Visit   PT Visit Date 09/27/22   Note Type   Note Type Treatment   Pain Assessment   Pain Assessment Tool 0-10   Pain Score No Pain  (@ rest, but + pain w/ LLE AROM especially @ hip)   Effect of Pain on Daily Activities limits AROM LLE especially in sitting   Patient's Stated Pain Goal No pain   Hospital Pain Intervention(s) Repositioned;MD notified (Comment); Ambulation/increased activity   Restrictions/Precautions   Weight Bearing Precautions Per Order No   Other Precautions Bed Alarm; Chair Alarm;Cognitive; Fall Risk  (? vision impairment L side)   General   Chart Reviewed Yes   Response to Previous Treatment Patient unable to report, no changes reported from family or staff   Family/Caregiver Present No   Cognition   Overall Cognitive Status Impaired   Arousal/Participation Alert   Memory Decreased recall of precautions;Decreased recall of recent events;Decreased short term memory   Subjective   Subjective Patient reports that she is eager to walk today, states that she does feel off balance does not know why she is leaning to the left side  Pt also reports that she has had "difficulty" with her Left leg for a few weeks  Likes to be addressed as "Pip"   Bed Mobility   Supine to Sit Unable to assess  (as pt sitting @ EOB w/ OT upon entering room )   Transfers   Sit to Stand 4  Minimal assistance   Additional items Assist x 1; Increased time required;Verbal cues;Armrests   Stand to Sit 4  Minimal assistance   Additional items Assist x 1; Increased time required;Verbal cues;Armrests   Additional Comments MMT recheck:  LLE hip flexion tested to 3 but painful in seated position at lateral hip, ,knee extension 4, ankle dorsiflexion 3+  Posture noted left thoracic scoliosis    No issues with left right discrimination, no pronator drift  Additionally patient requires assist of 2 for repositioning to central location in the chair   Ambulation/Elevation   Gait pattern Improper Weight shift;Decreased R stance  (L and posterior lean)   Gait Assistance 3  Moderate assist  (Fluctuates between Min and mod assist, but primarily for walker management and retropulsion/left lean)   Additional items Verbal cues   Assistive Device Rolling walker   Distance 2'+50' taking more than reasonable time   Stair Management Assistance Not tested   Ambulation/Elevation Additional Comments Degradation of gait quality with increased ambulation to   Balance   Static Sitting Poor +   Static Standing Poor +   Ambulatory Poor  (Retropulsion left lean)   Endurance Deficit   Endurance Deficit Yes   Endurance Deficit Description Self reported fatigue, degradation of gait quality with increased ambulation distance  Activity Tolerance   Activity Tolerance Patient limited by fatigue   Medical Staff Made Aware Care coordination with Ann Marie from OT  [place] texted Dr Lucio Hayden, resident, and case management regarding concerns with patient's mobility and subjective reports that she has had left leg issues for few weeks   Nurse Made Aware Spoke to RN before session   Exercises   Neuro re-ed Higher level balance activities during gait trials using walker with turning head, with patient needing between Min and mod assist to maintain upright balance   Assessment   Prognosis Fair   Problem List Decreased strength;Decreased endurance; Impaired balance;Decreased mobility; Decreased cognition; Impaired judgement;Decreased safety awareness  (?  Left vision)   Assessment (S)  Cole Sheppard did make some minor mobility progress since initial evaluation  She does still require mod assist for bed mobility (as per OT), and Min assist for transfers    However she does require less consistent physical support for performing ambulation trials and was able to ambulate further distances during 2nd gait trial   She did display substantial left and posterior leaning during mobility portions of session both in sitting and standing as well, but no issues with left right discrimination  Did notice a slight decrease in strength on LLE  Messaged provider and resident regarding concerns  Skilled PT recommended to progress patient towards treatment goals with emphasis on further gait training using rolling walker--but will need caution as patient has left and posterior lean  Barriers to Discharge Decreased caregiver support; Inaccessible home environment   Goals   Patient Goals To go home eventually, not fall   STG Expiration Date 10/05/22   PT Treatment Day 1   Plan   Treatment/Interventions Functional transfer training;LE strengthening/ROM; Therapeutic exercise; Endurance training;Cognitive reorientation;Patient/family training;Equipment eval/education; Bed mobility;Gait training;Spoke to nursing;Spoke to case management;Spoke to MD;OT   Progress Progressing toward goals   PT Frequency 3-5x/wk   Recommendation   PT Discharge Recommendation Post acute rehabilitation services   Equipment Recommended [facility] Recommended Wheeled walker   Sav Rodas 435   Turning in Bed Without Bedrails 2   Lying on Back to Sitting on Edge of Flat Bed 2   Moving Bed to Chair 2   Standing Up From Chair 3   Walk in Room 2   Climb 3-5 Stairs 1   Basic Mobility Inpatient Raw Score 12   Basic Mobility Standardized Score 32 23   Highest Level Of Mobility   JH-HLM Goal 4: Move to chair/commode   JH-HLM Achieved 7: Walk 25 feet or more   Education   Education Provided Mobility training;Assistive device   Patient Reinforcement needed; Explanation/teachback used   End of Consult   Patient Position at End of Consult Bedside chair; All needs within reach;Bed/Chair alarm activated   Pt requires PT /OT co-treat due to required skilled interventions of at least 2 clinicians for care delivery especially for balance deviations, medical complexity, limited activity tolerance, and cognitive-behavioral impairments  PT and OT goals addressed separately  The patient's [facility] Basic Mobility Inpatient Short Form Raw Score is 12, Standardized Score is 32 23  A Raw Score of less than 16 suggests the patient may benefit from discharge to post-acute rehabilitation services, which DOES coincide with CURRENT above PT recommendations  However please refer to therapist recommendation for discharge planning given other factors that may influence destination  Adapted from [facility] of [facility] 6-Clicks Basic Mobility and Daily Activity Scores With Discharge Destination  Physical Therapy, 2021;101:1-9   DOI: [email or web address]    Lilliana Colón, PT, DPT

## 2022-09-27 NOTE — CASE MANAGEMENT
Case Management Progress Note    Patient name Mary Ann Benítez  Location S /S -17 MRN [id number]  : 1/10/1928 Date 2022       LOS (days): 2  Geometric Mean LOS (GMLOS) (days): 3 30  Days to GMLOS:1 5        OBJECTIVE:        Current admission status: Inpatient  Preferred Pharmacy:   [facility] [address] [address]  Phone: [phone] Fax: [phone]    Primary Care Provider: Annalisa Hutchins MD    Primary Insurance: MEDICARE  Secondary Insurance: BLUE CROSS    PROGRESS NOTE:    CM spoke with pt and her niece, Wood Early  CM reviewed options are: [facility], and [place]  CM also informed them that [facility] would require a LTC application to be completed and reviewed prior to making their determination for STR  Pt and Wood Early will review with Keke (Korin's sister and pt POA) and notify CM Of their determination  CM informed by Wood Early that the pt would prefer to go to [address] [place] [address]  Wood Early reports pt and family are agreeable to the COVID booster prior to admission

## 2022-09-27 NOTE — OCCUPATIONAL THERAPY NOTE
Occupational Therapy Evaluation     Patient Name: Jaylan Plummer  JMMPX'A Date: 9/27/2022  Problem List  Principal Problem:    Acute encephalopathy  Active Problems:    HLD (hyperlipidemia)    Hypothyroidism    CKD (chronic kidney disease)    HTN (hypertension)    Anemia    Bilateral lower extremity edema    UTI (urinary tract infection)    Past Medical History  Past Medical History:   Diagnosis Date    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    CKD (chronic kidney disease)     HLD (hyperlipidemia)     HTN (hypertension)     Hypothyroidism      Past Surgical History  History reviewed  No pertinent surgical history  09/27/22 1511   OT Last Visit   OT Visit Date 09/27/22   Note Type   Note type Evaluation   Additional Comments Pt received  supine in bed on this date reporting no pain + is agreeable to OT session @ this time  Nsg staff verbally cleared pt for OT evaluation  @ exit, pt remains in  seated in bedside recliner c all lines intact, all needs met, call bell in hand + nsg aware of location/disposition  Restrictions/Precautions   Weight Bearing Precautions Per Order No   Other Precautions Cognitive; Chair Alarm; Bed Alarm; Fall Risk;Impulsive;Telemetry;Multiple lines   Pain Assessment   Pain Assessment Tool 0-10   Pain Score No Pain  (Initially reports 0/10 pain but once seated in recliner, reports increased discomfort/pain c limited ROM @ LLE (points to hip))   Hospital Pain Intervention(s) Rest;Repositioned   Home Living   Type of [facility] One level;Performs ADLs on one level; Able to live on main level with bedroom/bathroom  (0 SHAHEED)   Bathroom Shower/Tub Tub/shower unit   Bathroom Toilet Raised   Bathroom Equipment Shower chair   [address] Walker  (RW intermittent)   Prior Function   Level of [place] Independent with ADLs and functional mobility   Lives With Alone   Receives Help From Family;Personal care attendant  ([employer] 4days/wk, 4hrs/day, lluvia check on pt)   ADL Assistance Independent   IADLs Needs assistance  ((-) driving, family or [employer] complete laundry, cleaning, cooking, grocery shopping)   Falls in the last 6 months 0   Vocational Retired  (Worked in accounting)   Comments (-)    Lifestyle   Autonomy Pt lives in  apartment  alone + @ baseline, performs ADLs  I'ly, IADLs with A + fxl mobility I'ly without AD  (-)   Reciprocal Relationships Family relations   Service to Others Lluvia   Psychosocial   Psychosocial (WDL) WDL   Subjective   Subjective "You won't get a straight answer out of me  ADL   Eating Assistance 5  Supervision/Setup   Grooming Assistance 5  Supervision/Setup   UB Bathing Assistance 5  Supervision/Setup   LB Bathing Assistance 3  Moderate Assistance   UB Dressing Assistance 5  Supervision/Setup   LB Dressing Assistance 2  Maximal [address]  2  Maximal Assistance   Additional Comments Given fxl performance skills + deficits, therapist suspecting via clinical judgement + skilled analysis; pt currently requires stated assist above to perform each area of ADL d/t limitations including: generalized muscle weakness, sitting/standing balance deficits, fxl activity tolerance limitations, difficulty performing bend/reach-anterior trunk flexion, requires external assist to complete transitional movements, decreased core strength, decreased postural control, instability in stance, cognitive deficits, safety awareness concerns, decreased problem solving abilities, new use of AD, BUE strength and high fall risk   Bed Mobility   Supine to Sit 3  Moderate assistance   Additional items Assist x 1;HOB elevated; Bedrails; Increased time required;Verbal cues;LE management   Additional Comments DNT sit-sup; pt transitioned from EOB-bedside recliner  Transfers   Sit to Stand 4  Minimal assistance   Additional items Assist x 1; Increased time required;Verbal cues   Stand to Sit 4  Minimal assistance   Additional items Assist x 1; Armrests; Increased time required;Verbal cues   Functional Mobility   Functional Mobility 3  Moderate assistance   Additional Comments Pt performed short distance ADL related fxl mobility in room / hallway c mod A simulating toileting distances  No overt LOB demonstrated however, substantial L lateral lean while seated EOB + in stance observed  Requires moderate A to sustain erect posture @ midline + pt able to verbally identify shift toward L  Requires A for RW management t/o mobility to safely navigate room/hallway  Pt continues to c/o no pain/ no SOB/ no dizziness during transitional movements  F safety awareness noted while navigating t/o room  Poor insight noted  Transitions to bedside recliner for remainder of session  Balance   Static Sitting Poor +   Dynamic Sitting Poor   Static Standing Poor +   Dynamic Standing Poor   Ambulatory Poor   Activity Tolerance   Activity Tolerance Patient limited by fatigue   Medical Staff Made Aware PT/OT co-eval on this date d/t medical complexity, ambulatory dysfunction c high fall risk, various impeding lines + requirement for skilled interdisciplinary analysis of appropriate d/c recommendations  PT/OT POC/goals I'ly determined per respective discipline  Lourdes Harris   [address] [place] [address] staff made aware of current fxn, recommendations for d/c planning, fall risk + pt's location upon exit   Clement Jacobson   RUE Assessment   RUE Assessment WFL  (3+/5 grossly)   LUE Assessment   LUE Assessment WFL  (3+/5 grossly)   Hand Function   Gross Motor Coordination Functional   Fine Motor Coordination Impaired   Sensation   Light Touch No apparent deficits   Vision-Basic Assessment   Current Vision Does not wear glasses   Vision - Complex Assessment   Ocular Range of Motion WFL   Head Position WDL   Tracking Able to track stimulus in all quads without difficulty   Additional Comments Attempted SLCT; scored 49   Cognition   Overall Cognitive Status Impaired   Arousal/Participation Alert; Responsive; Cooperative   Attention Within functional limits   Orientation Level Oriented to person;Oriented to place;Oriented to situation;Disoriented to time   Memory Decreased short term memory;Decreased recall of recent events;Decreased recall of precautions;Decreased long term memory   Following Commands Follows one step commands without difficulty   Comments SBT administered: scored 17 indiciating impairment consistent c dementia (>10 category) + SLCT scored 49 indicating high concern for visual scanning difficulties  Assessment   Limitation Decreased ADL status; Decreased UE strength;Decreased Safe judgement during ADL;Decreased endurance;Decreased self-care trans;Decreased high-level ADLs   Prognosis Fair   Assessment Patient is a 80 y o  female seen for OT evaluation s/p admit to  [address] [place] [address] on 9/24/2022 w/Acute encephalopathy + commorbidities/PMHx (as listed in medical record) affecting patient's functional performance c ADL tasks at time of assessment  OT orders placed for evaluation and treatment to assess pt's ADLs, cognitive status + performance during functional tasks in order to formulate appropriate d/c recommendations  Therapist performed at least two patient identifiers during session including name and wristband  Personal factors affecting patient at time of initial evaluation include: limited home support, advanced age, inability to perform IADLs, inability to perform ADLs, new need for AD, inability to ambulate household distances and limited insight into impairments     Pt's clinical presentation is currently evolving given new onset deficits that effect pt's occupational performance and ability to safely return to OF including decrease activity tolerance, decrease standing balance, decrease sitting balance, decrease performance during ADL tasks, decrease cognition, decrease safety awareness , increase impulsiveness, decrease UB MS, decrease generalized strength, decrease activity engagement, decrease performance during functional transfers, decrease FM control, limited family support, high fall risk and limited insight to deficits combined with medical complications of abnormal renal lab values, abnormal CBC, impulsivity during admission and need for input for mobility technique/safety  This evaluation required an extensive review of medical and/or therapy records and additional review of physical, cognitive and psychosocial history related to functional performance  Based upon functional performance deficits and assessments, this evaluation has been identified as a  high complexity evaluation  Patient to benefit from continued Occupational Therapy treatment while in the hospital to address aforementioned deficits and maximize level of functional independence with ADLs and functional mobility  Pt currently requires A to facilitate appropriate d/c plan, supportive family involved and demonstrates P awareness in d/c plan  From OT standpoint, recommendation at time of d/c would be Short Term Rehab  Plan   Treatment Interventions ADL retraining;Functional transfer training;UE strengthening/ROM; Endurance training;Patient/family training; Compensatory technique education; Energy conservation; Activityengagement   Goal Expiration Date 10/07/22   OT Treatment Day 1   OT Frequency 3-5x/wk   Additional Treatment Session   Start Time 4285   End Time 9373   Treatment Assessment Further addressing concerns as noted in initial evaluation  Pt s/u c food tray + demonstrates poor ability to assist self c opening containers d/t [facility] deficits / poor initiation  Administered SLCT + pt scoring 49 indicating high concern for visual scanning deficits/visual ability     Recommendation   OT Discharge Recommendation Post acute rehabilitation services   AM-PAC Daily Activity Inpatient   Lower Body Dressing 2   Bathing 2   Toileting 2   Upper Body Dressing 3   Grooming 3   Eating 3   Daily Activity Raw Score 15   Daily Activity Standardized Score (Calc for Raw Score >=11) 34 69   AM-PAC Applied Cognition Inpatient   Following a Speech/Presentation 4   Understanding Ordinary Conversation 3   Taking Medications 1   Remembering Where Things Are Placed or Put Away 3   Remembering List of 4-5 Errands 2   Taking Care of Complicated Tasks 2   Applied Cognition Raw Score 15   Applied Cognition Standardized Score 33 54   Barthel Index   Feeding 5   Bathing 0   Grooming Score 0   Dressing Score 0   Bladder Score 5   Bowels Score 5   Toilet Use Score 5   Transfers (Bed/Chair) Score 10   Mobility (Level Surface) Score 0   Stairs Score 0   Barthel Index Score 30   The patient's raw score on the AM-PAC Daily Activity inpatient short form is 15, standardized score is 34 69, less than 39 4  Patients at this level are likely to benefit from DC to post-acute rehabilitation services  Please refer to the recommendation of the Occupational Therapist for safe DC planning  GOALS:    *ADL transfers with (I) for inc'd independence with ADLs/purposeful tasks    *UB ADL with (I) for inc'd independence with self cares    *LB ADL with (I) using AE prn for inc'd independence with self cares    *Toileting with (I) for clothing management and hygiene for return to PLOF with personal care    *Increase stand tolerance x 10  m for inc'd tolerance with standing purposeful tasks    *Participate in 10m UE therex to increase overall stamina/activity tolerance for purposeful tasks    *Bed mobility- (I) for inc'd independence to manage own comfort and initiate EOB & OOB purposeful tasks    *Patient will verbalize 3 safety awareness/ principles to prevent falls in the home setting  *Patient will verbalize and demonstrate use of energy conservation/deep breathing techniques and work simplification skills during functional activities with no verbal cues       *Patient will increase OOB/sitting tolerance to 2-4 hours per day to increase participation in self-care and leisure tasks with no s/s of exertion  *Patient will engage in ongoing cognitive assessment to assist with safe discharge planning/recommendations  *Pt will verbalize and demonstrate understanding of post-op movement precautions 848% (if applicable) in tx sessions for increased safety and functional mobility        *Pt will demonstrate use of long handled AE (if appropriate) during 100% of tx sessions for increased ADL safety and independence following D/C     Ade Leone, OTR/L

## 2022-09-27 NOTE — PLAN OF CARE
Problem: PHYSICAL THERAPY ADULT  Goal: Performs mobility at highest level of function for planned discharge setting  See evaluation for individualized goals  Description: Treatment/Interventions: Functional transfer training, LE strengthening/ROM, Therapeutic exercise, Endurance training, Cognitive reorientation, Patient/family training, Equipment eval/education, Bed mobility, Gait training          See flowsheet documentation for full assessment, interventions and recommendations  Outcome: Progressing  Note: Prognosis: Fair  Problem List: Decreased strength, Decreased endurance, Impaired balance, Decreased mobility, Decreased cognition, Impaired judgement, Decreased safety awareness (?  Left vision)  Assessment: (S) PIETER did make some minor mobility progress since initial evaluation  She does still require mod assist for bed mobility (as per OT), and Min assist for transfers  However she does require less consistent physical support for performing ambulation trials and was able to ambulate further distances during 2nd gait trial   She did display substantial left and posterior leaning during mobility portions of session both in sitting and standing as well, but no issues with left right discrimination  Did notice a slight decrease in strength on LLE  Messaged provider and resident regarding concerns  Skilled PT recommended to progress patient towards treatment goals with emphasis on further gait training using rolling walker--but will need caution as patient has left and posterior lean  Barriers to Discharge: Decreased caregiver support, Inaccessible home environment     PT Discharge Recommendation: Post acute rehabilitation services    See flowsheet documentation for full assessment

## 2022-09-27 NOTE — ASSESSMENT & PLAN NOTE
POA- BP low normal since presentation    Plan-  Will also hold ARB-HCTZ and Amlodipine, given poor PO intake the last few days

## 2022-09-27 NOTE — PROGRESS NOTES
[facility]  Progress Note Devendraanne Brown 1/10/1928, 80 y o  female MRN: [id number]  Unit/Bed#: S -01 Encounter: [id number]  Primary Care Provider: Corey Levi MD   Date and time admitted to hospital: 9/24/2022  2:27 PM    UTI (urinary tract infection)  Assessment & Plan  · UA with innumerable bacteria and leuks  Urine culture-E coli  Day 3 of Ceftriaxone    Plan-  Continue Ceftriaxone      * Acute encephalopathy  Assessment & Plan  Presented with confusion, generalized weakness, fatigue and bowel, bladder incontinence  Possibly d/t UTI,  Mentation improving alert oriented x3    Plan-   Monitor neuro checks  Fall precautions  PT/OT recommendations  CM-Patient lives alone, family has concerns and is interested in long-term care, after post acute rehabilitation  Financial application is needed for [facility] for determination CM made her aware that this would need to be completed ASAP in order to have a determination from [facility], CM will follow up     Anemia  Assessment & Plan  Hgb 9 7 on presentation with prior Hgb of 10 5 five months ago  Iron panel c/w CARLOS   Hbg 9 8    Plan-  Continue venofer   Monitor CBC  Bowel regimen    Bilateral lower extremity edema  Assessment & Plan  Patient with pitting edema of bilateral lower extremities on exam  She notes that her edema has been present and worsening for the past few months   Murmur also noted on exam    No prior h/o cardiac disease, CHF noted , R/o cardiac etiology vs due to amlodipine use    Upon examination no lower extremity edema noted, improved    Intake/Output Summary (Last 24 hours) at 9/27/2022 1443  Last data filed at 9/27/2022 0701  Gross per 24 hour   Intake --   Output 900 ml   Net -900 ml       Plan-  Hold amlodipine  Elevate extremities, SCDs in place  Monitor I&Os  Daily weights    HTN (hypertension)  Assessment & Plan  POA- BP low normal since presentation    Plan-  Will also hold ARB-HCTZ and Amlodipine, given poor PO intake the last few days    CKD (chronic kidney disease)  Assessment & Plan  Lab Results   Component Value Date    EGFR 45 2022    EGFR 39 2022    EGFR 35 2022    CREATININE 1 06 2022    CREATININE 1 19 2022    CREATININE 1 30 2022     Cr at baseline on presentation  Encourage PO intake  Continue to monitor    Hypothyroidism  Assessment & Plan  Home medication levothyroxine 25  TSH-1 685    Plan-  Continue levothyroxine    HLD (hyperlipidemia)  Assessment & Plan  Home medication simvastatin 20mg    Plan-  Continue pravastatin 40mg      VTE Pharmacologic Prophylaxis: VTE Score: 3 Moderate Risk (Score 3-4) - Pharmacological DVT Prophylaxis Ordered: heparin  Patient Centered Rounds: I performed bedside rounds with nursing staff today  Discussions with Specialists or Other Care Team Provider: None    Education and Discussions with Family / Patient: Updated  (niece) via phone  Current Length of Stay: 2 day(s)  Current Patient Status: Inpatient   Discharge Plan: Anticipate discharge tomorrow to rehab facility  Code Status: Level 3 - DNAR and DNI    Subjective:   Patient was seen examined at bedside  Nursing staff reported no overnight events  Patient states she is feels well and has no complaints at this time  Patient denies chest pain, shortness a breath, abdominal pain, nausea, vomiting, diarrhea, suprapubic pain, dysuria, or any other symptoms at this time  Objective:     Vitals:   Temp (24hrs), Av °F (37 2 °C), Min:98 4 °F (36 9 °C), Max:99 7 °F (37 6 °C)    Temp:  [98 4 °F (36 9 °C)-99 7 °F (37 6 °C)] 98 4 °F (36 9 °C)  HR:  [72-88] 72  Resp:  [18] 18  BP: ()/(60-71) 122/60  SpO2:  [94 %-95 %] 94 %  Body mass index is 21 28 kg/m²  Input and Output Summary (last 24 hours):      Intake/Output Summary (Last 24 hours) at 2022 1444  Last data filed at 2022 0701  Gross per 24 hour   Intake --   Output 900 ml   Net -900 ml Physical Exam:   Physical Exam  Vitals and nursing note reviewed  Constitutional:       General: She is not in acute distress  Appearance: She is well-developed  She is not ill-appearing, toxic-appearing or diaphoretic  Comments: thin   HENT:      Head: Normocephalic and atraumatic  Mouth/Throat:      Mouth: Mucous membranes are moist    Eyes:      Extraocular Movements: Extraocular movements intact  Conjunctiva/sclera: Conjunctivae normal       Pupils: Pupils are equal, round, and reactive to light  Cardiovascular:      Rate and Rhythm: Normal rate and regular rhythm  Pulses: Normal pulses  Heart sounds: Normal heart sounds  No murmur heard  Pulmonary:      Effort: Pulmonary effort is normal  No respiratory distress  Breath sounds: Normal breath sounds  No wheezing  Abdominal:      General: Bowel sounds are normal  There is no distension  Palpations: Abdomen is soft  Tenderness: There is no abdominal tenderness  Musculoskeletal:      Cervical back: Neck supple  Right lower leg: No edema  Left lower leg: No edema  Skin:     General: Skin is warm and dry  Neurological:      Mental Status: She is alert and oriented to person, place, and time  Cranial Nerves: No cranial nerve deficit  Motor: No weakness        Coordination: Coordination normal    Psychiatric:         Mood and Affect: Mood normal          Behavior: Behavior normal           Additional Data:     Labs:  Results from last 7 days   Lab Units 09/27/22  0457   WBC Thousand/uL 4 50   HEMOGLOBIN g/dL 9 8*   HEMATOCRIT % 32 5*   PLATELETS Thousands/uL 191   NEUTROS PCT % 51   LYMPHS PCT % 34   MONOS PCT % 14*   EOS PCT % 1     Results from last 7 days   Lab Units 09/27/22  0457 09/26/22  0458 09/24/22  1657   SODIUM mmol/L 137   < > 135   POTASSIUM mmol/L 3 7   < > 3 7   CHLORIDE mmol/L 102   < > 99   CO2 mmol/L 28   < > 30   BUN mg/dL 19   < > 25   CREATININE mg/dL 1 06   < > 1 30 ANION GAP mmol/L 7   < > 6   CALCIUM mg/dL 8 1*   < > 8 6   ALBUMIN g/dL  --   --  3 2*   TOTAL BILIRUBIN mg/dL  --   --  0 50   ALK PHOS U/L  --   --  42   ALT U/L  --   --  6*   AST U/L  --   --  14   GLUCOSE RANDOM mg/dL 81   < > 94    < > = values in this interval not displayed  Lines/Drains:  Invasive Devices  Report    Peripheral Intravenous Line  Duration           Peripheral IV 09/26/22 Left Antecubital 1 day                      Imaging: No pertinent imaging reviewed  Recent Cultures (last 7 days):   Results from last 7 days   Lab Units 09/25/22  0407   URINE CULTURE  >100,000 cfu/ml Escherichia coli*       Last 24 Hours Medication List:   Current Facility-Administered Medications   Medication Dose Route Frequency Provider Last Rate    cefTRIAXone  1,000 mg Intravenous Q24H Zakia Bevels, PA-C 1,000 mg (09/27/22 045)    COVID-19 Pfizer vac bivalent phill-sucrose  0 3 mL Intramuscular Once Dedrick Churchill MD      heparin (porcine)  5,000 Units Subcutaneous [facility] Zakia Barber [place]      levothyroxine  25 mcg Oral Early Morning Zakia Barber PA-C      lidocaine   Topical Daily PRN Dedrick Churchill MD      polyethylene glycol  17 g Oral Daily PRN Dedrick Churchill MD      pravastatin  40 mg Oral Daily With [employer]      saliva substitute  5 spray Mouth/Throat 4x Daily PRN Dedrick Churchill MD          Today, Patient Was Seen By: Dedrick Churchill MD    **Please Note: This note may have been constructed using a voice recognition system  **

## 2022-09-27 NOTE — CASE MANAGEMENT
Case Management Progress Note    Patient name Lita Hernandez  Location S /S -10 MRN [id number]  : 1/10/1928 Date 2022       LOS (days): 2  Geometric Mean LOS (GMLOS) (days): 3 30  Days to GMLOS:1 4        OBJECTIVE:        Current admission status: Inpatient  Preferred Pharmacy:   [facility] [address] [facility] [address] [place] [address] [address] [place] [id number]  Phone: [phone] Fax: [phone]    Primary Care Provider: Humble Spangler MD    Primary Insurance: MEDICARE  Secondary Insurance: BLUE CROSS    PROGRESS NOTE:    CM received a phone call from alfredo madrid/Ezra HAYDEN [phone]  Ezra Bray reports that she just spoke with their elder  and now may want to consider HFM  Ezra Bray aware that HFM will not provide a determination without the financial application  CM made her aware that this would need to be completed ASAP in order to have a determination from [facility]  CM Made Keke aware where the application can be found and that she can e-mail it directly to [facility]  Ezra Bray is going to speak with her sister, Ida Robertson, and update CM on their discussion

## 2022-09-27 NOTE — ASSESSMENT & PLAN NOTE
Presented with confusion, generalized weakness, fatigue and bowel, bladder incontinence  Possibly d/t UTI,  Mentation improving alert oriented x3    Plan-   Monitor neuro checks  Fall precautions     PT/OT recommendations  CM-Patient lives alone, family has concerns and is interested in long-term care, after post acute rehabilitation  Financial application is needed for [facility] for determination CM made her aware that this would need to be completed ASAP in order to have a determination from [facility], CM will follow up

## 2022-09-27 NOTE — ASSESSMENT & PLAN NOTE
Lab Results   Component Value Date    EGFR 45 09/27/2022    EGFR 39 09/26/2022    EGFR 35 09/24/2022    CREATININE 1 06 09/27/2022    CREATININE 1 19 09/26/2022    CREATININE 1 30 09/24/2022     Cr at baseline on presentation  Encourage PO intake  Continue to monitor

## 2022-09-27 NOTE — ASSESSMENT & PLAN NOTE
Hgb 9 7 on presentation with prior Hgb of 10 5 five months ago  Iron panel c/w CARLOS   Hbg 9 8    Plan-  Continue venofer   Monitor CBC  Bowel regimen

## 2022-09-28 PROBLEM — U07.1 COVID: Status: ACTIVE | Noted: 2022-09-28

## 2022-09-28 LAB
ANION GAP SERPL CALCULATED.3IONS-SCNC: 5 MMOL/L (ref 4–13)
BASOPHILS # BLD MANUAL: 0 THOUSAND/UL (ref 0–0.1)
BASOPHILS NFR MAR MANUAL: 0 % (ref 0–1)
BUN SERPL-MCNC: 20 MG/DL (ref 5–25)
CALCIUM SERPL-MCNC: 8.1 MG/DL (ref 8.4–10.2)
CHLORIDE SERPL-SCNC: 102 MMOL/L (ref 96–108)
CHOLEST SERPL-MCNC: 137 MG/DL
CO2 SERPL-SCNC: 30 MMOL/L (ref 21–32)
CREAT SERPL-MCNC: 1.19 MG/DL (ref 0.6–1.3)
EOSINOPHIL # BLD MANUAL: 0.18 THOUSAND/UL (ref 0–0.4)
EOSINOPHIL NFR BLD MANUAL: 4 % (ref 0–6)
ERYTHROCYTE [DISTWIDTH] IN BLOOD BY AUTOMATED COUNT: 15.7 % (ref 11.6–15.1)
GFR SERPL CREATININE-BSD FRML MDRD: 39 ML/MIN/1.73SQ M
GLUCOSE SERPL-MCNC: 85 MG/DL (ref 65–140)
HCT VFR BLD AUTO: 34.2 % (ref 34.8–46.1)
HDLC SERPL-MCNC: 41 MG/DL
HGB BLD-MCNC: 10.1 G/DL (ref 11.5–15.4)
LDLC SERPL CALC-MCNC: 79 MG/DL (ref 0–100)
LYMPHOCYTES # BLD AUTO: 1.93 THOUSAND/UL (ref 0.6–4.47)
LYMPHOCYTES # BLD AUTO: 44 % (ref 14–44)
MCH RBC QN AUTO: 22.5 PG (ref 26.8–34.3)
MCHC RBC AUTO-ENTMCNC: 29.5 G/DL (ref 31.4–37.4)
MCV RBC AUTO: 76 FL (ref 82–98)
MONOCYTES # BLD AUTO: 0.7 THOUSAND/UL (ref 0–1.22)
MONOCYTES NFR BLD: 16 % (ref 4–12)
NEUTROPHILS # BLD MANUAL: 1.58 THOUSAND/UL (ref 1.85–7.62)
NEUTS SEG NFR BLD AUTO: 36 % (ref 43–75)
OVALOCYTES BLD QL SMEAR: PRESENT
PLATELET # BLD AUTO: 186 THOUSANDS/UL (ref 149–390)
PLATELET BLD QL SMEAR: ADEQUATE
PMV BLD AUTO: 12.7 FL (ref 8.9–12.7)
POIKILOCYTOSIS BLD QL SMEAR: PRESENT
POTASSIUM SERPL-SCNC: 3.8 MMOL/L (ref 3.5–5.3)
RBC # BLD AUTO: 4.49 MILLION/UL (ref 3.81–5.12)
RBC MORPH BLD: PRESENT
SODIUM SERPL-SCNC: 137 MMOL/L (ref 135–147)
TRIGL SERPL-MCNC: 83 MG/DL
WBC # BLD AUTO: 4.39 THOUSAND/UL (ref 4.31–10.16)

## 2022-09-28 PROCEDURE — 85027 COMPLETE CBC AUTOMATED: CPT | Performed by: HOSPITALIST

## 2022-09-28 PROCEDURE — 80061 LIPID PANEL: CPT | Performed by: INTERNAL MEDICINE

## 2022-09-28 PROCEDURE — 85007 BL SMEAR W/DIFF WBC COUNT: CPT | Performed by: HOSPITALIST

## 2022-09-28 PROCEDURE — 80048 BASIC METABOLIC PNL TOTAL CA: CPT | Performed by: HOSPITALIST

## 2022-09-28 PROCEDURE — 99232 SBSQ HOSP IP/OBS MODERATE 35: CPT | Performed by: INTERNAL MEDICINE

## 2022-09-28 RX ORDER — PRAVASTATIN SODIUM 10 MG
10 TABLET ORAL
Status: DISCONTINUED | OUTPATIENT
Start: 2022-09-28 | End: 2022-09-30 | Stop reason: HOSPADM

## 2022-09-28 RX ADMIN — HEPARIN SODIUM 5000 UNITS: 5000 INJECTION INTRAVENOUS; SUBCUTANEOUS at 16:43

## 2022-09-28 RX ADMIN — LEVOTHYROXINE SODIUM 25 MCG: 25 TABLET ORAL at 06:06

## 2022-09-28 RX ADMIN — CEFTRIAXONE 1000 MG: 1 INJECTION, SOLUTION INTRAVENOUS at 06:12

## 2022-09-28 RX ADMIN — HEPARIN SODIUM 5000 UNITS: 5000 INJECTION INTRAVENOUS; SUBCUTANEOUS at 23:04

## 2022-09-28 RX ADMIN — PRAVASTATIN SODIUM 10 MG: 10 TABLET ORAL at 16:43

## 2022-09-28 RX ADMIN — HEPARIN SODIUM 5000 UNITS: 5000 INJECTION INTRAVENOUS; SUBCUTANEOUS at 06:06

## 2022-09-28 NOTE — QUICK NOTE
Notified by RN that patient tested +COVID and therefore COVID booster held  Notified that patient will be transferred to [place] [id number]

## 2022-09-28 NOTE — ASSESSMENT & PLAN NOTE
· UA with innumerable bacteria and leuks  Urine culture-E coli      Plan-  Discontinued Ceftriaxone today, completed course

## 2022-09-28 NOTE — ASSESSMENT & PLAN NOTE
Hgb 9 7 on presentation with prior Hgb of 10 5 five months ago  Iron panel c/w CARLOS   Hbg 10 1    Plan-  Continue venofer   Monitor CBC  Bowel regimen

## 2022-09-28 NOTE — ASSESSMENT & PLAN NOTE
Lab Results   Component Value Date    EGFR 39 09/28/2022    EGFR 45 09/27/2022    EGFR 39 09/26/2022    CREATININE 1 19 09/28/2022    CREATININE 1 06 09/27/2022    CREATININE 1 19 09/26/2022     Cr at baseline on presentation  Encourage PO intake  Continue to monitor

## 2022-09-28 NOTE — PLAN OF CARE
Problem: Prexisting or High Potential for Compromised Skin Integrity  Goal: Skin integrity is maintained or improved  Description: INTERVENTIONS:  - Identify patients at risk for skin breakdown  - Assess and monitor skin integrity  - Assess and monitor nutrition and hydration status  - Monitor labs   - Assess for incontinence   - Turn and reposition patient  - Assist with mobility/ambulation  - Relieve pressure over bony prominences  - Avoid friction and shearing  - Provide appropriate hygiene as needed including keeping skin clean and dry  - Evaluate need for skin moisturizer/barrier cream  - Collaborate with interdisciplinary team   - Patient/family teaching  - Consider wound care consult   Outcome: Progressing     Problem: PAIN - ADULT  Goal: Verbalizes/displays adequate comfort level or baseline comfort level  Description: Interventions:  - Encourage patient to monitor pain and request assistance  - Assess pain using appropriate pain scale  - Administer analgesics based on type and severity of pain and evaluate response  - Implement non-pharmacological measures as appropriate and evaluate response  - Consider cultural and social influences on pain and pain management  - Notify physician/advanced practitioner if interventions unsuccessful or patient reports new pain  Outcome: Progressing     Problem: INFECTION - ADULT  Goal: Absence or prevention of progression during hospitalization  Description: INTERVENTIONS:  - Assess and monitor for signs and symptoms of infection  - Monitor lab/diagnostic results  - Monitor all insertion sites, i e  indwelling lines, tubes, and drains  - Monitor endotracheal if appropriate and nasal secretions for changes in amount and color  - [place] appropriate cooling/warming therapies per order  - Administer medications as ordered  - Instruct and encourage patient and family to use good hand hygiene technique  - Identify and instruct in appropriate isolation precautions for identified infection/condition  Outcome: Progressing  Goal: Absence of fever/infection during neutropenic period  Description: INTERVENTIONS:  - Monitor WBC    Outcome: Progressing     Problem: SAFETY ADULT  Goal: Patient will remain free of falls  Description: INTERVENTIONS:  - Educate patient/family on patient safety including physical limitations  - Instruct patient to call for assistance with activity   - Consult OT/PT to assist with strengthening/mobility   - Keep Call bell within reach  - Keep bed low and locked with side rails adjusted as appropriate  - Keep care items and personal belongings within reach  - Initiate and maintain comfort rounds  - Make Fall Risk Sign visible to staff  - Offer Toileting every  Hours, in advance of need  - Initiate/Maintain alarm  - Obtain necessary fall risk management equipment:   - Apply yellow socks and bracelet for high fall risk patients  - Consider moving patient to room near nurses station  Outcome: Progressing  Goal: Maintain or return to baseline ADL function  Description: INTERVENTIONS:  -  Assess patient's ability to carry out ADLs; assess patient's baseline for ADL function and identify physical deficits which impact ability to perform ADLs (bathing, care of mouth/teeth, toileting, grooming, dressing, etc )  - Assess/evaluate cause of self-care deficits   - Assess range of motion  - Assess patient's mobility; develop plan if impaired  - Assess patient's need for assistive devices and provide as appropriate  - Encourage maximum independence but intervene and supervise when necessary  - Involve family in performance of ADLs  - Assess for home care needs following discharge   - Consider OT consult to assist with ADL evaluation and planning for discharge  - Provide patient education as appropriate  Outcome: Progressing  Goal: Maintains/Returns to pre admission functional level  Description: INTERVENTIONS:  - Perform BMAT or MOVE assessment daily    - Set and communicate daily mobility goal to care team and patient/family/caregiver  - Collaborate with rehabilitation services on mobility goals if consulted  - Perform Range of Motion  times a day  - Reposition patient every  hours  - Dangle patient  times a day  - Stand patient  times a day  - Ambulate patient  times a day  - Out of bed to chair  times a day   - Out of bed for meals  times a day  - Out of bed for toileting  - Record patient progress and toleration of activity level   Outcome: Progressing     Problem: DISCHARGE PLANNING  Goal: Discharge to home or other facility with appropriate resources  Description: INTERVENTIONS:  - Identify barriers to discharge w/patient and caregiver  - Arrange for needed discharge resources and transportation as appropriate  - Identify discharge learning needs (meds, wound care, etc )  - Arrange for interpretive services to assist at discharge as needed  - Refer to Case Management Department for coordinating discharge planning if the patient needs post-hospital services based on physician/advanced practitioner order or complex needs related to functional status, cognitive ability, or social support system  Outcome: Progressing     Problem: Knowledge Deficit  Goal: Patient/family/caregiver demonstrates understanding of disease process, treatment plan, medications, and discharge instructions  Description: Complete learning assessment and assess knowledge base    Interventions:  - Provide teaching at level of understanding  - Provide teaching via preferred learning methods  Outcome: Progressing     Problem: Potential for Falls  Goal: Patient will remain free of falls  Description: INTERVENTIONS:  - Educate patient/family on patient safety including physical limitations  - Instruct patient to call for assistance with activity   - Consult OT/PT to assist with strengthening/mobility   - Keep Call bell within reach  - Keep bed low and locked with side rails adjusted as appropriate  - Keep care items and personal belongings within reach  - Initiate and maintain comfort rounds  - Make Fall Risk Sign visible to staff  - Offer Toileting every  Hours, in advance of need  - Initiate/Maintain alarm  - Obtain necessary fall risk management equipment:   - Apply yellow socks and bracelet for high fall risk patients  - Consider moving patient to room near nurses station  Outcome: Progressing     Problem: MOBILITY - ADULT  Goal: Maintain or return to baseline ADL function  Description: INTERVENTIONS:  -  Assess patient's ability to carry out ADLs; assess patient's baseline for ADL function and identify physical deficits which impact ability to perform ADLs (bathing, care of mouth/teeth, toileting, grooming, dressing, etc )  - Assess/evaluate cause of self-care deficits   - Assess range of motion  - Assess patient's mobility; develop plan if impaired  - Assess patient's need for assistive devices and provide as appropriate  - Encourage maximum independence but intervene and supervise when necessary  - Involve family in performance of ADLs  - Assess for home care needs following discharge   - Consider OT consult to assist with ADL evaluation and planning for discharge  - Provide patient education as appropriate  Outcome: Progressing  Goal: Maintains/Returns to pre admission functional level  Description: INTERVENTIONS:  - Perform BMAT or MOVE assessment daily    - Set and communicate daily mobility goal to care team and patient/family/caregiver  - Collaborate with rehabilitation services on mobility goals if consulted  - Perform Range of Motion  times a day  - Reposition patient every  hours    - Dangle patient  times a day  - Stand patient  times a day  - Ambulate patient  times a day  - Out of bed to chair times a day   - Out of bed for meals  times a day  - Out of bed for toileting  - Record patient progress and toleration of activity level   Outcome: Progressing

## 2022-09-28 NOTE — ASSESSMENT & PLAN NOTE
Patient required Covid screening prior to discharge and resulted positive  Dry cough   SPO2 95%  Upon examination lungs are clear    Plan-  Continue Tessalon Perles as needed for cough

## 2022-09-28 NOTE — ASSESSMENT & PLAN NOTE
Home medication simvastatin 20mg  Lipid Panel Total 137, LDL 79    Plan-  Continue pravastatin 10mg  Follow up with PCP

## 2022-09-28 NOTE — ASSESSMENT & PLAN NOTE
Hgb 9 7 on presentation with prior Hgb of 10 5 five months ago  Iron panel c/w CARLOS   Hbg 10 1    Plan-  Continue iron supplementation  Continue miralax as needed   Complete CBC in 1 week, follow up with PCP

## 2022-09-28 NOTE — ASSESSMENT & PLAN NOTE
· UA with innumerable bacteria and leuks  Urine culture-E coli  Resolved    Plan-  Discontinued Ceftriaxone today, completed course

## 2022-09-28 NOTE — ASSESSMENT & PLAN NOTE
Lab Results   Component Value Date    EGFR 40 09/29/2022    EGFR 39 09/28/2022    EGFR 45 09/27/2022    CREATININE 1 15 09/29/2022    CREATININE 1 19 09/28/2022    CREATININE 1 06 09/27/2022     Cr at baseline on presentation  Encourage PO intake

## 2022-09-28 NOTE — PROGRESS NOTES
[facility]  Progress Note Darlyn Mata 1/10/1928, 80 y o  female MRN: [id number]  Unit/Bed#: S -01 Encounter: [id number]  Primary Care Provider: Deion Lucas MD   Date and time admitted to hospital: 9/24/2022  2:27 PM    Karen Leon 22  Patient required Covid screening prior to discharge and resulted positive  Asymptomatic  SPO2 95%  Upon examination lungs are clear    Plan-  Monitor for worsening sx   No intervention or tx at this time    UTI (urinary tract infection)  Assessment & Plan  · UA with innumerable bacteria and leuks  Urine culture-E coli      Plan-  Discontinued Ceftriaxone today, completed course         * Acute encephalopathy  Assessment & Plan  Presented with confusion, generalized weakness, fatigue and bowel, bladder incontinence  Possibly d/t UTI,  Mentation improving alert oriented x3  CT Head - No acute intracranial abnormality, Stable postoperative changes related to left paraclinoid aneurysm clipping  Chronic bilateral basal ganglia lacunar infarcts  Chronic microangiopathic ischemic changes, Persistent moderate ventriculomegaly, slightly out of proportion to the central and cortical volume loss which can be seen with exaggerated ex vacuo dilatation related to volume loss versus normal pressure hydrocephalus in the appropriate clinical setting      Plan-   Monitor neuro checks    Fall precautions  PT/OT recommendations- Unsteady gait yesterday, baseline unknown  CM-Patient lives alone, family has concerns and is interested in long-term care, after post acute rehabilitation  Screened for COVID yesterday for placement at [facility] [place] [facility] [place] and was Covid +  Awaiting bed placement due to Covid status    Anemia  Assessment & Plan  Hgb 9 7 on presentation with prior Hgb of 10 5 five months ago  Iron panel c/w CARLOS   Hbg 10 1    Plan-  Continue venofer   Monitor CBC  Bowel regimen    Bilateral lower extremity edema  Assessment & Plan  Patient with pitting edema of bilateral lower extremities on exam  She notes that her edema has been present and worsening for the past few months  Murmur also noted on exam    No prior h/o cardiac disease, CHF noted , R/o cardiac etiology vs due to amlodipine use    Upon examination no lower extremity edema noted, improved    Intake/Output Summary (Last 24 hours) at 9/28/2022 1331  Last data filed at 9/28/2022 0858  Gross per 24 hour   Intake 280 ml   Output --   Net 280 ml       Plan-  Hold amlodipine  Elevate extremities, SCDs in place  Monitor I&Os  Daily weights    HTN (hypertension)  Assessment & Plan  POA- BP low normal since presentation    Plan-  Will also hold ARB-HCTZ and Amlodipine, given poor PO intake the last few days    CKD (chronic kidney disease)  Assessment & Plan  Lab Results   Component Value Date    EGFR 39 09/28/2022    EGFR 45 09/27/2022    EGFR 39 09/26/2022    CREATININE 1 19 09/28/2022    CREATININE 1 06 09/27/2022    CREATININE 1 19 09/26/2022     Cr at baseline on presentation  Encourage PO intake  Continue to monitor    Hypothyroidism  Assessment & Plan  Home medication levothyroxine 25  TSH-1 685    Plan-  Continue levothyroxine    HLD (hyperlipidemia)  Assessment & Plan  Home medication simvastatin 20mg  Lipid Panel Total 137, LDL 79    Plan-  Continue pravastatin 10mg        VTE Pharmacologic Prophylaxis: VTE Score: 3 Moderate Risk (Score 3-4) - Pharmacological DVT Prophylaxis Ordered: heparin  Patient Centered Rounds: I performed bedside rounds with nursing staff today  Discussions with Specialists or Other Care Team Provider: None    Education and Discussions with Family / Patient: Updated  (niece) via phone  Current Length of Stay: 3 day(s)  Current Patient Status: Inpatient   Discharge Plan: Anticipate discharge tomorrow to rehab facility  Code Status: Level 3 - DNAR and DNI    Subjective:   Patient was seen and examined at bedside  Nursing reported no overnight events   Patient reports that she feels well and has no complaints at this time  Patient was eager to go to rehab, however bed placement has been delayed due to covid +  Patient denies CP, SOB, abdominal pain, n/v/d, confusion, HA, weakness, numbness, or any other sx at this time  Objective:     Vitals:   Temp (24hrs), Av 2 °F (36 8 °C), Min:98 2 °F (36 8 °C), Max:98 3 °F (36 8 °C)    Temp:  [98 2 °F (36 8 °C)-98 3 °F (36 8 °C)] 98 2 °F (36 8 °C)  HR:  [71-73] 73  Resp:  [18] 18  BP: (118-127)/(53-65) 118/53  SpO2:  [93 %-95 %] 94 %  Body mass index is 21 28 kg/m²  Input and Output Summary (last 24 hours): Intake/Output Summary (Last 24 hours) at 2022 1332  Last data filed at 2022 0858  Gross per 24 hour   Intake 280 ml   Output --   Net 280 ml       Physical Exam:   Physical Exam  Vitals and nursing note reviewed  Constitutional:       General: She is not in acute distress  Appearance: She is well-developed  She is not ill-appearing, toxic-appearing or diaphoretic  Comments: thin   HENT:      Head: Normocephalic and atraumatic  Mouth/Throat:      Mouth: Mucous membranes are moist    Eyes:      Extraocular Movements: Extraocular movements intact  Conjunctiva/sclera: Conjunctivae normal       Pupils: Pupils are equal, round, and reactive to light  Cardiovascular:      Rate and Rhythm: Normal rate and regular rhythm  Pulses: Normal pulses  Heart sounds: Normal heart sounds  No murmur heard  Pulmonary:      Effort: Pulmonary effort is normal  No respiratory distress  Breath sounds: Normal breath sounds  No wheezing  Abdominal:      General: Bowel sounds are normal  There is no distension  Palpations: Abdomen is soft  Tenderness: There is no abdominal tenderness  Musculoskeletal:      Cervical back: Neck supple  Right lower leg: No edema  Left lower leg: No edema  Skin:     General: Skin is warm and dry     Neurological:      Mental Status: She is alert and oriented to person, place, and time  Cranial Nerves: No cranial nerve deficit  Motor: No weakness  Coordination: Coordination normal    Psychiatric:         Mood and Affect: Mood normal          Behavior: Behavior normal           Additional Data:     Labs:  Results from last 7 days   Lab Units 09/28/22  0606 09/27/22  0457   WBC Thousand/uL 4 39 4 50   HEMOGLOBIN g/dL 10 1* 9 8*   HEMATOCRIT % 34 2* 32 5*   PLATELETS Thousands/uL 186 191   NEUTROS PCT %  --  51   LYMPHS PCT %  --  34   LYMPHO PCT % 44  --    MONOS PCT %  --  14*   MONO PCT % 16*  --    EOS PCT % 4 1     Results from last 7 days   Lab Units 09/28/22  0606 09/26/22  0458 09/24/22  1657   SODIUM mmol/L 137   < > 135   POTASSIUM mmol/L 3 8   < > 3 7   CHLORIDE mmol/L 102   < > 99   CO2 mmol/L 30   < > 30   BUN mg/dL 20   < > 25   CREATININE mg/dL 1 19   < > 1 30   ANION GAP mmol/L 5   < > 6   CALCIUM mg/dL 8 1*   < > 8 6   ALBUMIN g/dL  --   --  3 2*   TOTAL BILIRUBIN mg/dL  --   --  0 50   ALK PHOS U/L  --   --  42   ALT U/L  --   --  6*   AST U/L  --   --  14   GLUCOSE RANDOM mg/dL 85   < > 94    < > = values in this interval not displayed                         Lines/Drains:  Invasive Devices  Report    Peripheral Intravenous Line  Duration           Peripheral IV 09/26/22 Left Antecubital 2 days                      Imaging: Reviewed radiology reports from this admission including: CT head    Recent Cultures (last 7 days):   Results from last 7 days   Lab Units 09/25/22  0407   URINE CULTURE  >100,000 cfu/ml Escherichia coli*       Last 24 Hours Medication List:   Current Facility-Administered Medications   Medication Dose Route Frequency Provider Last Rate    heparin (porcine)  5,000 Units Subcutaneous [facility] DAISY Sykes      levothyroxine  25 mcg Oral Early Morning DAISY Sykes      lidocaine   Topical Daily PRN Ivelisse Donnelly MD      polyethylene glycol  17 g Oral Daily PRN Ivelisse Donnelly MD      pravastatin 10 mg Oral Daily With Golden Guerrero MD      saliva substitute  5 spray Mouth/Throat 4x Daily PRN Tiffani Westbrook MD          Today, Patient Was Seen By: Tiffani Westbrook    **Please Note: This note may have been constructed using a voice recognition system  **

## 2022-09-28 NOTE — ASSESSMENT & PLAN NOTE
Presented with confusion, generalized weakness, fatigue and bowel, bladder incontinence  Possibly d/t UTI,  Mentation improving alert oriented x3  CT Head - No acute intracranial abnormality, Stable postoperative changes related to left paraclinoid aneurysm clipping  Chronic bilateral basal ganglia lacunar infarcts    Chronic microangiopathic ischemic changes, Persistent moderate ventriculomegaly, slightly out of proportion to the central and cortical volume loss which can be seen with exaggerated ex vacuo dilatation related to volume loss versus normal pressure hydrocephalus in the appropriate clinical setting  Resolved     Plan-  Fall precautions  PT/OT recommendations- Unsteady gait would benefit from post acute rehabilitation   Stable for discharge

## 2022-09-28 NOTE — ASSESSMENT & PLAN NOTE
POA- BP low normal since presentation    Plan-  Discontinued blood pressures medications upon discharge as BP throughout hospitalization have been soft   Follow up with PCP

## 2022-09-28 NOTE — ASSESSMENT & PLAN NOTE
Patient with pitting edema of bilateral lower extremities on exam  She notes that her edema has been present and worsening for the past few months   Murmur also noted on exam    No prior h/o cardiac disease, CHF noted , R/o cardiac etiology vs due to amlodipine use    Upon examination no lower extremity edema noted, improved    Intake/Output Summary (Last 24 hours) at 9/28/2022 1324  Last data filed at 9/28/2022 0858  Gross per 24 hour   Intake 280 ml   Output --   Net 280 ml       Plan-  Hold amlodipine  Elevate extremities, SCDs in place  Monitor I&Os  Daily weights

## 2022-09-28 NOTE — ASSESSMENT & PLAN NOTE
Patient with pitting edema of bilateral lower extremities on exam  She notes that her edema has been present and worsening for the past few months   Murmur also noted on exam    No prior h/o cardiac disease, CHF noted , R/o cardiac etiology vs due to amlodipine use    Upon examination no lower extremity edema noted    Intake/Output Summary (Last 24 hours) at 9/29/2022 1540  Last data filed at 9/29/2022 0601  Gross per 24 hour   Intake 240 ml   Output 700 ml   Net -460 ml       Plan-  Follow up with PCP

## 2022-09-28 NOTE — ASSESSMENT & PLAN NOTE
Presented with confusion, generalized weakness, fatigue and bowel, bladder incontinence  Possibly d/t UTI,  Mentation improving alert oriented x3  CT Head - No acute intracranial abnormality, Stable postoperative changes related to left paraclinoid aneurysm clipping  Chronic bilateral basal ganglia lacunar infarcts  Chronic microangiopathic ischemic changes, Persistent moderate ventriculomegaly, slightly out of proportion to the central and cortical volume loss which can be seen with exaggerated ex vacuo dilatation related to volume loss versus normal pressure hydrocephalus in the appropriate clinical setting      Plan-   Monitor neuro checks    Fall precautions  PT/OT recommendations- Unsteady gait yesterday, baseline unknown  CM-Patient lives alone, family has concerns and is interested in long-term care, after post acute rehabilitation  Screened for COVID yesterday for placement at [facility] and was Covid +  Awaiting bed placement due to Covid status

## 2022-09-29 LAB
ANION GAP SERPL CALCULATED.3IONS-SCNC: 4 MMOL/L (ref 4–13)
BUN SERPL-MCNC: 18 MG/DL (ref 5–25)
CALCIUM SERPL-MCNC: 8.2 MG/DL (ref 8.4–10.2)
CHLORIDE SERPL-SCNC: 104 MMOL/L (ref 96–108)
CO2 SERPL-SCNC: 30 MMOL/L (ref 21–32)
CREAT SERPL-MCNC: 1.15 MG/DL (ref 0.6–1.3)
ERYTHROCYTE [DISTWIDTH] IN BLOOD BY AUTOMATED COUNT: 15.8 % (ref 11.6–15.1)
GFR SERPL CREATININE-BSD FRML MDRD: 40 ML/MIN/1.73SQ M
GLUCOSE SERPL-MCNC: 89 MG/DL (ref 65–140)
HCT VFR BLD AUTO: 32.5 % (ref 34.8–46.1)
HGB BLD-MCNC: 9.8 G/DL (ref 11.5–15.4)
MCH RBC QN AUTO: 22.8 PG (ref 26.8–34.3)
MCHC RBC AUTO-ENTMCNC: 30.2 G/DL (ref 31.4–37.4)
MCV RBC AUTO: 76 FL (ref 82–98)
PLATELET # BLD AUTO: 197 THOUSANDS/UL (ref 149–390)
PMV BLD AUTO: 12.2 FL (ref 8.9–12.7)
POTASSIUM SERPL-SCNC: 4.2 MMOL/L (ref 3.5–5.3)
RBC # BLD AUTO: 4.3 MILLION/UL (ref 3.81–5.12)
SODIUM SERPL-SCNC: 138 MMOL/L (ref 135–147)
WBC # BLD AUTO: 4.7 THOUSAND/UL (ref 4.31–10.16)

## 2022-09-29 PROCEDURE — 99232 SBSQ HOSP IP/OBS MODERATE 35: CPT | Performed by: INTERNAL MEDICINE

## 2022-09-29 PROCEDURE — 85027 COMPLETE CBC AUTOMATED: CPT

## 2022-09-29 PROCEDURE — 80048 BASIC METABOLIC PNL TOTAL CA: CPT

## 2022-09-29 RX ORDER — BENZONATATE 100 MG/1
100 CAPSULE ORAL 3 TIMES DAILY PRN
Qty: 20 CAPSULE | Refills: 0 | Status: SHIPPED | OUTPATIENT
Start: 2022-09-29

## 2022-09-29 RX ORDER — FERROUS SULFATE TAB EC 324 MG (65 MG FE EQUIVALENT) 324 (65 FE) MG
324 TABLET DELAYED RESPONSE ORAL EVERY OTHER DAY
Qty: 15 TABLET | Refills: 0 | Status: SHIPPED | OUTPATIENT
Start: 2022-09-29 | End: 2022-10-29

## 2022-09-29 RX ORDER — BENZONATATE 100 MG/1
100 CAPSULE ORAL 3 TIMES DAILY PRN
Status: DISCONTINUED | OUTPATIENT
Start: 2022-09-29 | End: 2022-09-30 | Stop reason: HOSPADM

## 2022-09-29 RX ORDER — POLYETHYLENE GLYCOL 3350 17 G/17G
17 POWDER, FOR SOLUTION ORAL DAILY PRN
Qty: 12 EACH | Refills: 0 | Status: SHIPPED | OUTPATIENT
Start: 2022-09-29 | End: 2022-10-29

## 2022-09-29 RX ADMIN — PRAVASTATIN SODIUM 10 MG: 10 TABLET ORAL at 18:54

## 2022-09-29 RX ADMIN — HEPARIN SODIUM 5000 UNITS: 5000 INJECTION INTRAVENOUS; SUBCUTANEOUS at 05:55

## 2022-09-29 RX ADMIN — HEPARIN SODIUM 5000 UNITS: 5000 INJECTION INTRAVENOUS; SUBCUTANEOUS at 22:08

## 2022-09-29 RX ADMIN — HEPARIN SODIUM 5000 UNITS: 5000 INJECTION INTRAVENOUS; SUBCUTANEOUS at 15:11

## 2022-09-29 RX ADMIN — LEVOTHYROXINE SODIUM 25 MCG: 25 TABLET ORAL at 05:55

## 2022-09-29 NOTE — DISCHARGE SUMMARY
[facility]  Discharge- Marge Howard 1/10/1928, 80 y o  female MRN: [id number]  Unit/Bed#: S -01 Encounter: [id number]  Primary Care Provider: Roddy Granger MD   Date and time admitted to hospital: 9/24/2022  2:27 PM    [address]  Patient required Covid screening prior to discharge and resulted positive  Dry cough   SPO2 95%  Upon examination lungs are clear    Plan-  Continue Tessalon Perles as needed for cough     UTI (urinary tract infection)  Assessment & Plan  · UA with innumerable bacteria and leuks  Urine culture-E coli  Resolved    Plan-  Discontinued Ceftriaxone today, completed course         * Acute encephalopathy  Assessment & Plan  Presented with confusion, generalized weakness, fatigue and bowel, bladder incontinence  Possibly d/t UTI,  Mentation improving alert oriented x3  CT Head - No acute intracranial abnormality, Stable postoperative changes related to left paraclinoid aneurysm clipping  Chronic bilateral basal ganglia lacunar infarcts  Chronic microangiopathic ischemic changes, Persistent moderate ventriculomegaly, slightly out of proportion to the central and cortical volume loss which can be seen with exaggerated ex vacuo dilatation related to volume loss versus normal pressure hydrocephalus in the appropriate clinical setting  Resolved     Plan-  Fall precautions  PT/OT recommendations- Unsteady gait would benefit from post acute rehabilitation   Stable for discharge    Anemia  Assessment & Plan  Hgb 9 7 on presentation with prior Hgb of 10 5 five months ago  Iron panel c/w CARLOS   Hbg 10 1    Plan-  Continue iron supplementation  Continue miralax as needed   Complete CBC in 1 week, follow up with PCP    Bilateral lower extremity edema  Assessment & Plan  Patient with pitting edema of bilateral lower extremities on exam  She notes that her edema has been present and worsening for the past few months   Murmur also noted on exam    No prior h/o cardiac disease, CHF noted , R/o cardiac etiology vs due to amlodipine use    Upon examination no lower extremity edema noted  No intake or output data in the 24 hours ending 09/30/22 1319    Plan-  Follow up with PCP     HTN (hypertension)  Assessment & Plan  POA- BP low normal since presentation    Plan-  Discontinued blood pressures medications upon discharge as BP throughout hospitalization have been soft   Follow up with PCP    CKD (chronic kidney disease)  Assessment & Plan  Lab Results   Component Value Date    EGFR 40 09/29/2022    EGFR 39 09/28/2022    EGFR 45 09/27/2022    CREATININE 1 15 09/29/2022    CREATININE 1 19 09/28/2022    CREATININE 1 06 09/27/2022     Cr at baseline on presentation  Encourage PO intake      Hypothyroidism  Assessment & Plan  Home medication levothyroxine 25  TSH-1 685    Plan-  Continue levothyroxine    HLD (hyperlipidemia)  Assessment & Plan  Home medication simvastatin 20mg  Lipid Panel Total 137, LDL 79    Plan-  Continue pravastatin 10mg  Follow up with PCP       Medical Problems             Resolved Problems  Date Reviewed: 9/26/2022   None               Discharging Resident: Wes Villa  Discharging Attending: Cathy Waldrop MD  PCP: Breanne Ty MD  Admission Date:   Admission Orders (From admission, onward)     Ordered        09/25/22 1439  Inpatient Admission  Once            09/24/22 1818  Place in Observation  Once                      Discharge Date: 09/30/22    Consultations During Hospital Stay:  · None     Procedures Performed:   · None    Significant Findings / Test Results:   CT head wo contrast   Final Result by Jenni Muller MD (09/28 6228)   1  No acute intracranial abnormality  2  Stable postoperative changes related to left paraclinoid aneurysm clipping  3  Chronic bilateral basal ganglia lacunar infarcts  Chronic microangiopathic ischemic changes     4  Persistent moderate ventriculomegaly, slightly out of proportion to the central and cortical volume loss which can be seen with exaggerated ex vacuo dilatation related to volume loss versus normal pressure hydrocephalus in the appropriate clinical    setting  Workstation performed: [id number]           ·     Incidental Findings:   ·  None    Test Results Pending at Discharge (will require follow up): · None     Outpatient Tests Requested:  · CBC     Complications:  None    Reason for Admission: None    Hospital Course:   Phyllis Brown is a 80 y o  female with a PMH of hypertension, hyperlipidemia, hypothyroidism patient who originally presented to the hospital on 9/24/2022 due to his altered mental status  Patient was admitted to the hospital for AMS secondary to UTI  Patient was continued on ceftriaxone throughout hospitalization  Prior to discharge patient was seen by Physical therapy who also noticed patient's gait unstable  Patient then had a CT head which showed above findings, unchanged, stable ventriculomegaly  Patient was also screened for COVID prior to discharge and resulted positive  Patient was experiencing dry cough but no other symptoms  Tessalon Perles were prescribed  Throughout hospitalization patient's hemoglobin was around 10 1, stable however changed from her baseline  Iron panel demonstrated iron deficiency anemia, and was supplemented with Venofer throughout stay  Patient will be discharged with iron supplementation as well as MiraLax  Patient's blood pressure has been soft throughout hospitalization as well and the medications were not given  Patient has been instructed to hold blood pressure medications with discharge and follow-up with PCP  Patient is stable upon discharge  Please see above list of diagnoses and related plan for additional information  Condition at Discharge: stable    Discharge Day Visit / Exam:   Subjective:  Patient was seen examined at bedside  Nursing staff reported no overnight events    Upon my examination patient was experiencing dry cough however had no other complaints at this time  Patient denied congestion, sore throat, abdominal pain, nausea, vomiting, diarrhea, fever, chills, confusion, or any other symptoms at this time  Vitals: Blood Pressure: 136/65 (09/30/22 0842)  Pulse: 72 (09/30/22 0842)  Temperature: 98 3 °F (36 8 °C) (09/29/22 2128)  Temp Source: Oral (09/29/22 2128)  Respirations: 17 (09/30/22 0842)  Height: 5' 5" (165 1 cm) (09/25/22 0905)  Weight - Scale: 54 kg (119 lb 0 8 oz) (09/30/22 0552)  SpO2: 96 % (09/30/22 0842)  Exam:   Physical Exam  Vitals and nursing note reviewed  Constitutional:       General: She is not in acute distress  Appearance: She is well-developed  She is not ill-appearing, toxic-appearing or diaphoretic  Comments: thin   HENT:      Head: Normocephalic and atraumatic  Mouth/Throat:      Mouth: Mucous membranes are moist    Eyes:      Extraocular Movements: Extraocular movements intact  Conjunctiva/sclera: Conjunctivae normal       Pupils: Pupils are equal, round, and reactive to light  Cardiovascular:      Rate and Rhythm: Normal rate and regular rhythm  Pulses: Normal pulses  Heart sounds: Normal heart sounds  No murmur heard  Pulmonary:      Effort: Pulmonary effort is normal  No respiratory distress  Breath sounds: Normal breath sounds  No wheezing  Abdominal:      General: Bowel sounds are normal  There is no distension  Palpations: Abdomen is soft  Tenderness: There is no abdominal tenderness  Musculoskeletal:      Cervical back: Neck supple  Right lower leg: No edema  Left lower leg: No edema  Skin:     General: Skin is warm and dry  Neurological:      Mental Status: She is alert and oriented to person, place, and time  Cranial Nerves: No cranial nerve deficit  Motor: No weakness        Coordination: Coordination normal    Psychiatric:         Mood and Affect: Mood normal          Behavior: Behavior normal           Discussion with Family: Updated  (niece) via phone  Discharge instructions/Information to patient and family:   See after visit summary for information provided to patient and family  Provisions for Follow-Up Care:  See after visit summary for information related to follow-up care and any pertinent home health orders  Disposition:   Assisted Living Facility at [facility]    Planned Readmission:  None    Discharge Medications:  See after visit summary for reconciled discharge medications provided to patient and/or family        **Please Note: This note may have been constructed using a voice recognition system**

## 2022-09-29 NOTE — DISCHARGE INSTR - AVS FIRST PAGE
Dear Alon Palencia,     It was our pleasure to care for you here at [facility], [address]  It is our hope that we were always able to exceed the expected standards for your care during your stay  You were hospitalized due to Altered Mental Status  You were cared for on the 3rd floor by Karine Rawls under the service of Maxx Blanca MD with the [facility] who covers for your primary care physician (PCP), Brando Fry MD, while you were hospitalized  If you have any questions or concerns related to this hospitalization, you may contact us at [phone]  For follow up as well as any medication refills, we recommend that you follow up with your primary care physician  A registered nurse will reach out to you by phone within a few days after your discharge to answer any additional questions that you may have after going home  However, at this time we provide for you here, the most important instructions / recommendations at discharge:     Notable Medication Adjustments -   Please take Tessalon Perles as needed for cough  Please continue taking Iron supplements  Please take Miralax as needed, Iron supplement may cause constipation   Please discontinue blood pressure medications, as your blood pressure throughout hospitalization has been on the lower side  Testing Required after Discharge -   Blood work in 1 week to monitor hemoglobin   Important follow up information -   Please follow up with PCP in 1 week   Other Instructions -   If you experience fevers, shortness of breath, chest pain, abdominal pain, vomiting, please come to the ED or call your PCP immediately   Please review this entire after visit summary as additional general instructions including medication list, appointments, activity, diet, any pertinent wound care, and other additional recommendations from your care team that may be provided for you        Sincerely,     Karine Rawls'

## 2022-09-29 NOTE — PHYSICAL THERAPY NOTE
Physical Therapy Cancellation Note       09/29/22 1525   PT Last Visit   PT Visit Date 09/29/22   Note Type   Note Type Cancelled Session   Cancel Reasons Other  ([facility] states pt is scheduled to discharge from the hospital today at 17:30   PT session cancelled due to pending discharge )     Asuncion Correa

## 2022-09-29 NOTE — CASE MANAGEMENT
Case Management Discharge Planning Note    Patient name Luana Hyman  Location S /S -01 MRN [id number]  : 1/10/1928 Date 2022       Current Admission Date: 2022  Current Admission Diagnosis:Acute encephalopathy   Patient Active Problem List    Diagnosis Date Noted    COVID 2022    UTI (urinary tract infection) 2022    HLD (hyperlipidemia) 2022    Hypothyroidism 2022    CKD (chronic kidney disease) 2022    HTN (hypertension) 2022    Acute encephalopathy 2022    Anemia 2022    Bilateral lower extremity edema 2022      LOS (days): 4  Geometric Mean LOS (GMLOS) (days): 4 50  Days to GMLOS:0 4     OBJECTIVE:  Risk of Unplanned Readmission Score: 14 53         Current admission status: Inpatient   Preferred Pharmacy:   [facility] [address] [facility], [address] [place] [address]  Phone: [phone] Fax: [phone]    Primary Care Provider: Dora Figueroa MD    Primary Insurance: MEDICARE  Secondary Insurance: BLUE CROSS    DISCHARGE DETAILS:    Other Referral/Resources/Interventions Provided:  Interventions: Transportation, Short Term Rehab  Referral Comments: CM received call from Con goodwin family has chosen [facility] reserved in [address] and requested 6pm transport    Esvin Waite, Sloan Arrieta, and SLIM Resident Dr Apoorva Grullon informed    IMM Given (Date):: 22  IMM Given to[de-identified] [address], [address] : [facility]  Receiving Facility/Agency Phone Number: [phone]  Facility/Agency Fax Number: [phone]

## 2022-09-29 NOTE — CASE MANAGEMENT
Case Management Discharge Planning Note    Patient name Anna SHRESTHA /S -01 MRN [id number]  : 1/10/1928 Date 2022       Current Admission Date: 2022  Current Admission Diagnosis:Acute encephalopathy   Patient Active Problem List    Diagnosis Date Noted    COVID 2022    UTI (urinary tract infection) 2022    HLD (hyperlipidemia) 2022    Hypothyroidism 2022    CKD (chronic kidney disease) 2022    HTN (hypertension) 2022    Acute encephalopathy 2022    Anemia 2022    Bilateral lower extremity edema 2022      LOS (days): 4  Geometric Mean LOS (GMLOS) (days): 4 50  Days to GMLOS:0 5     OBJECTIVE:  Risk of Unplanned Readmission Score: 14 53         Current admission status: Inpatient   Preferred Pharmacy:   [facility] [address] [facility], [address] [place] [address]  Phone: [phone] Fax: [phone]    Primary Care Provider: Silvina Carreno MD    Primary Insurance: MEDICARE  Secondary Insurance: BLUE CROSS    DISCHARGE DETAILS:    Other Referral/Resources/Interventions Provided:  Referral Comments: [facility] [place] per request of family  CM informed Covid bed is now only available at [facility] at this time  CM returned call to Con informing her of the above  Tahmina Fiddler would like to discuss with liaison-Anu, as well as her sister prior to deciding between [facility] and [facility]    CM informed Esvin and SLIM Resident Dr Rafy Diehl    IMM Given (Date):: 22  IMM Given to[de-identified] Family

## 2022-09-29 NOTE — CASE MANAGEMENT
Case Management Discharge Planning Note    Patient name Jaylan SHRESTHA /S -01 MRN [id number]  : 1/10/1928 Date 2022       Current Admission Date: 2022  Current Admission Diagnosis:Acute encephalopathy   Patient Active Problem List    Diagnosis Date Noted    COVID 2022    UTI (urinary tract infection) 2022    HLD (hyperlipidemia) 2022    Hypothyroidism 2022    CKD (chronic kidney disease) 2022    HTN (hypertension) 2022    Acute encephalopathy 2022    Anemia 2022    Bilateral lower extremity edema 2022      LOS (days): 4  Geometric Mean LOS (GMLOS) (days): 4 50  Days to GMLOS:0 6     OBJECTIVE:  Risk of Unplanned Readmission Score: 14 53         Current admission status: Inpatient   [facility] Pharm[facility]:   [facility] [facility], [address] [place] [address]  Phone: [phone] Fax: [phone]    Primary Care Provider: Sriram Casey MD    Primary Insurance: MEDICARE  Secondary Insurance: BLUE CROSS    DISCHARGE DETAILS:    Discharge planning discussed with[de-identified] Mirna  Freedom of Choice: Yes  Comments - Freedom of Choice: CM discussed with julius via phone [facility]    Julius requesting to return call to  in about 15 minutes after discussing with her sister  CM contacted family/caregiver?: Yes  Were Treatment Team discharge recommendations reviewed with patient/caregiver?: Yes  Did patient/caregiver verbalize understanding of patient care needs?: N/A- going to facility  Were patient/caregiver advised of the risks associated with not following Treatment Team discharge recommendations?: Yes    Contacts  Patient Contacts: juliusJerilyn  Relationship to Patient[de-identified] Family  Contact Method: Phone  Phone Number: [phone]  Reason/Outcome: Continuity of Care, Discharge Planning, Referral    Other Referral/Resources/Interventions Provided:  Interventions: Short Term Rehab  Referral Comments: [address] able to accept, pending return call from niece for preference    CM informed SLIM Resident Dr Jelani De Los Santos    IMM Given (Date):: 09/25/22

## 2022-09-30 VITALS
BODY MASS INDEX: 19.83 KG/M2 | DIASTOLIC BLOOD PRESSURE: 57 MMHG | SYSTOLIC BLOOD PRESSURE: 75 MMHG | TEMPERATURE: 98.3 F | WEIGHT: 119.05 LBS | HEIGHT: 65 IN | RESPIRATION RATE: 16 BRPM | HEART RATE: 74 BPM | OXYGEN SATURATION: 88 %

## 2022-09-30 PROCEDURE — 99239 HOSP IP/OBS DSCHRG MGMT >30: CPT | Performed by: INTERNAL MEDICINE

## 2022-09-30 RX ORDER — LIDOCAINE 50 MG/G
1 PATCH TOPICAL ONCE
Status: COMPLETED | OUTPATIENT
Start: 2022-09-30 | End: 2022-09-30

## 2022-09-30 RX ORDER — MUSCLE RUB CREAM 100; 150 MG/G; MG/G
CREAM TOPICAL 4 TIMES DAILY PRN
Status: DISCONTINUED | OUTPATIENT
Start: 2022-09-30 | End: 2022-09-30 | Stop reason: HOSPADM

## 2022-09-30 RX ORDER — ACETAMINOPHEN 325 MG/1
975 TABLET ORAL ONCE
Status: COMPLETED | OUTPATIENT
Start: 2022-09-30 | End: 2022-09-30

## 2022-09-30 RX ADMIN — LEVOTHYROXINE SODIUM 25 MCG: 25 TABLET ORAL at 05:36

## 2022-09-30 RX ADMIN — LIDOCAINE 5% 1 PATCH: 700 PATCH TOPICAL at 02:33

## 2022-09-30 RX ADMIN — HEPARIN SODIUM 5000 UNITS: 5000 INJECTION INTRAVENOUS; SUBCUTANEOUS at 05:36

## 2022-09-30 RX ADMIN — ACETAMINOPHEN 975 MG: 325 TABLET ORAL at 02:32

## 2022-09-30 NOTE — ASSESSMENT & PLAN NOTE
Patient required Covid screening prior to discharge and resulted positive  Dry cough   SPO2 95%  Upon examination lungs are clear    Plan-  Continue Tessalon Perles as needed for cough   CM- Pending bed placement

## 2022-09-30 NOTE — CASE MANAGEMENT
Case Management Discharge Planning Note    Patient name Yayo Ba  Location S /S -01 MRN [id number]  : 1/10/1928 Date 2022       Current Admission Date: 2022  Current Admission Diagnosis:Acute encephalopathy   Patient Active Problem List    Diagnosis Date Noted    COVID 2022    UTI (urinary tract infection) 2022    HLD (hyperlipidemia) 2022    Hypothyroidism 2022    CKD (chronic kidney disease) 2022    HTN (hypertension) 2022    Acute encephalopathy 2022    Anemia 2022    Bilateral lower extremity edema 2022      LOS (days): 5  Geometric Mean LOS (GMLOS) (days): 4 50  Days to GMLOS:-0 3     OBJECTIVE:  Risk of Unplanned Readmission Score: 15 2         Current admission status: Inpatient   Preferred Pharmacy:   [facility] [address] [facility], [facility] [address] [place] [address]  Phone: [phone] Fax: [phone]    Primary Care Provider: Tavia Das MD    Primary Insurance: MEDICARE  Secondary Insurance: BLUE CROSS    DISCHARGE DETAILS:    Discharge planning discussed with[de-identified] Patient, Nicole Panchal and [facility]     Comments - [place] of Choice: CM notified all the above mentioned individuals of the Pt's d/c [facility] today  Contacts  Patient Contacts: Ginger Simpson  Relationship to Patient[de-identified] Family  Contact Method: Phone  Phone Number: [phone]  Reason/Outcome: Discharge [address]         Is the patient interested in [address] at discharge?: No    DME Referral Provided  Referral made for DME?: No         Transport at Discharge : [facility]  Dispatcher Contacted: Yes  Number/Name of Dispatcher: Sai López by Reji and Unit #):  Jrei  ETA of Transport (Date): 22  ETA of Transport (Time): 1600

## 2022-09-30 NOTE — ASSESSMENT & PLAN NOTE
Patient with pitting edema of bilateral lower extremities on exam  She notes that her edema has been present and worsening for the past few months   Murmur also noted on exam    No prior h/o cardiac disease, CHF noted , R/o cardiac etiology vs due to amlodipine use    Upon examination no lower extremity edema noted    Plan-  Follow up with PCP

## 2022-09-30 NOTE — PROGRESS NOTES
[facility]  Progress Note Beverly Montana 1/10/1928, 80 y o  female MRN: [id number]  Unit/Bed#: S -01 Encounter: [id number]  Primary Care Provider: Zain Rodriguez MD   Date and time admitted to hospital: 9/24/2022  2:27 PM    Karen Leon 22  Patient required Covid screening prior to discharge and resulted positive  Dry cough   SPO2 95%  Upon examination lungs are clear    Plan-  Continue Tessalon Perles as needed for cough   CM- Pending bed placement    UTI (urinary tract infection)  Assessment & Plan  · UA with innumerable bacteria and leuks  Urine culture-E coli  Resolved    Plan-  Discontinued Ceftriaxone today, completed course         * Acute encephalopathy  Assessment & Plan  Presented with confusion, generalized weakness, fatigue and bowel, bladder incontinence  Possibly d/t UTI,  Mentation improving alert oriented x3  CT Head - No acute intracranial abnormality, Stable postoperative changes related to left paraclinoid aneurysm clipping  Chronic bilateral basal ganglia lacunar infarcts  Chronic microangiopathic ischemic changes, Persistent moderate ventriculomegaly, slightly out of proportion to the central and cortical volume loss which can be seen with exaggerated ex vacuo dilatation related to volume loss versus normal pressure hydrocephalus in the appropriate clinical setting  Resolved     Plan-  Fall precautions  PT/OT recommendations- Unsteady gait would benefit from post acute rehabilitation   Stable for discharge    Anemia  Assessment & Plan  Hgb 9 7 on presentation with prior Hgb of 10 5 five months ago  Iron panel c/w CARLOS   Hbg 10 1    Plan-  Continue iron supplementation  Continue miralax as needed   Complete CBC in 1 week, follow up with PCP    Bilateral lower extremity edema  Assessment & Plan  Patient with pitting edema of bilateral lower extremities on exam  She notes that her edema has been present and worsening for the past few months   Murmur also noted on exam    No prior h/o cardiac disease, CHF noted , R/o cardiac etiology vs due to amlodipine use    Upon examination no lower extremity edema noted    Plan-  Follow up with PCP     HTN (hypertension)  Assessment & Plan  POA- BP low normal since presentation    Plan-  Discontinued blood pressures medications upon discharge as BP throughout hospitalization have been soft   Follow up with PCP    CKD (chronic kidney disease)  Assessment & Plan  Lab Results   Component Value Date    EGFR 40 2022    EGFR 39 2022    EGFR 45 2022    CREATININE 1 15 2022    CREATININE 1 19 2022    CREATININE 1 06 2022     Cr at baseline on presentation  Encourage PO intake      Hypothyroidism  Assessment & Plan  Home medication levothyroxine 25  TSH-1 685    Plan-  Continue levothyroxine    HLD (hyperlipidemia)  Assessment & Plan  Home medication simvastatin 20mg  Lipid Panel Total 137, LDL 79    Plan-  Continue pravastatin 10mg  Follow up with PCP           VTE Pharmacologic Prophylaxis: VTE Score: 3 Moderate Risk (Score 3-4) - Pharmacological DVT Prophylaxis Ordered: heparin  Patient Centered Rounds: I performed bedside rounds with nursing staff today  Discussions with Specialists or Other Care Team Provider: None    Education and Discussions with Family / Patient: Updated  (niece) via phone  Current Length of Stay: 5 day(s)  Current Patient Status: Inpatient   Discharge Plan: Anticipate discharge tomorrow to rehab facility  Code Status: Level 3 - DNAR and DNI    Subjective:   Patient was seen and examined at bedside  Patient reports that she is feeling well and is eager to be discharged tomorrow  Patient denies CP, SOB, fever, n/v/d, HA, dizziness, weakness, abdominal pain, urinary sx, or any other sx at this time       Objective:     Vitals:   Temp (24hrs), Av 2 °F (36 8 °C), Min:98 1 °F (36 7 °C), Max:98 3 °F (36 8 °C)    Temp:  [98 1 °F (36 7 °C)-98 3 °F (36 8 °C)] 98 3 °F (36 8 °C)  HR:  [72-84] 72  Resp:  [17-18] 17  BP: (113-136)/(50-65) 136/65  SpO2:  [95 %-97 %] 96 %  Body mass index is 19 81 kg/m²  Input and Output Summary (last 24 hours):   No intake or output data in the 24 hours ending 09/30/22 1351    Physical Exam:   Physical Exam  Vitals and nursing note reviewed  Constitutional:       General: She is not in acute distress  Appearance: She is well-developed  She is not ill-appearing, toxic-appearing or diaphoretic  Comments: thin   HENT:      Head: Normocephalic and atraumatic  Mouth/Throat:      Mouth: Mucous membranes are moist    Eyes:      Extraocular Movements: Extraocular movements intact  Conjunctiva/sclera: Conjunctivae normal       Pupils: Pupils are equal, round, and reactive to light  Cardiovascular:      Rate and Rhythm: Normal rate and regular rhythm  Pulses: Normal pulses  Heart sounds: Normal heart sounds  No murmur heard  Pulmonary:      Effort: Pulmonary effort is normal  No respiratory distress  Breath sounds: Normal breath sounds  No wheezing  Abdominal:      General: Bowel sounds are normal  There is no distension  Palpations: Abdomen is soft  Tenderness: There is no abdominal tenderness  Musculoskeletal:      Cervical back: Neck supple  Right lower leg: No edema  Left lower leg: No edema  Skin:     General: Skin is warm and dry  Neurological:      Mental Status: She is alert and oriented to person, place, and time  Cranial Nerves: No cranial nerve deficit  Motor: No weakness        Coordination: Coordination normal    Psychiatric:         Mood and Affect: Mood normal          Behavior: Behavior normal           Additional Data:     Labs:  Results from last 7 days   Lab Units 09/29/22  0628 09/28/22  0606 09/27/22  0457   WBC Thousand/uL 4 70 4 39 4 50   HEMOGLOBIN g/dL 9 8* 10 1* 9 8*   HEMATOCRIT % 32 5* 34 2* 32 5*   PLATELETS Thousands/uL 197 186 191 NEUTROS PCT %  --   --  51   LYMPHS PCT %  --   --  34   LYMPHO PCT %  --  44  --    MONOS PCT %  --   --  14*   MONO PCT %  --  16*  --    EOS PCT %  --  4 1     Results from last 7 days   Lab Units 09/29/22  0628 09/26/22  0458 09/24/22  1657   SODIUM mmol/L 138   < > 135   POTASSIUM mmol/L 4 2   < > 3 7   CHLORIDE mmol/L 104   < > 99   CO2 mmol/L 30   < > 30   BUN mg/dL 18   < > 25   CREATININE mg/dL 1 15   < > 1 30   ANION GAP mmol/L 4   < > 6   CALCIUM mg/dL 8 2*   < > 8 6   ALBUMIN g/dL  --   --  3 2*   TOTAL BILIRUBIN mg/dL  --   --  0 50   ALK PHOS U/L  --   --  42   ALT U/L  --   --  6*   AST U/L  --   --  14   GLUCOSE RANDOM mg/dL 89   < > 94    < > = values in this interval not displayed                         Lines/Drains:  Invasive Devices  Report    Peripheral Intravenous Line  Duration           Peripheral IV 09/26/22 Left Antecubital 4 days                      Imaging: Reviewed radiology reports from this admission including: CT head    Recent Cultures (last 7 days):   Results from last 7 days   Lab Units 09/25/22  0407   URINE CULTURE  >100,000 cfu/ml Escherichia coli*       Last 24 Hours Medication List:   Current Facility-Administered Medications   Medication Dose Route Frequency Provider Last Rate    benzonatate  100 mg Oral TID PRN Nicol Cha MD      heparin (porcine)  5,000 Units Subcutaneous [facility] [place], PA-C      levothyroxine  25 mcg Oral Early Morning DAISY Sykes      lidocaine  1 patch Topical Once Keon Velasquez MD      lidocaine   Topical Daily PRN Nicol Cha MD      menthol-methyl salicylate   Apply externally 4x Daily PRN Nicol Cha MD      polyethylene glycol  17 g Oral Daily PRN Nicol Cha MD      pravastatin  10 mg Oral Daily With Saleem Felix MD      saliva substitute  5 spray Mouth/Throat 4x Daily PRN Nicol Cha MD          Today, Patient Was Seen By: Nicol Cha    **Please Note: This note may have been constructed using a voice recognition system  **

## 2022-10-01 ENCOUNTER — NURSING HOME VISIT (OUTPATIENT)
Dept: GERIATRICS | Facility: OTHER | Age: 87
End: 2022-10-01
Payer: MEDICARE

## 2022-10-01 VITALS
SYSTOLIC BLOOD PRESSURE: 118 MMHG | WEIGHT: 119.5 LBS | RESPIRATION RATE: 18 BRPM | OXYGEN SATURATION: 96 % | HEART RATE: 76 BPM | DIASTOLIC BLOOD PRESSURE: 76 MMHG | BODY MASS INDEX: 19.89 KG/M2 | TEMPERATURE: 97.6 F

## 2022-10-01 DIAGNOSIS — N39.0 URINARY TRACT INFECTION WITHOUT HEMATURIA, SITE UNSPECIFIED: Primary | ICD-10-CM

## 2022-10-01 PROCEDURE — 99306 1ST NF CARE HIGH MDM 50: CPT | Performed by: INTERNAL MEDICINE

## 2022-10-01 NOTE — PROGRESS NOTES
[facility] [address], [address] [place] [address]  [id number]    Nursing Home Admission    NAME: Leo Strange  AGE: 80 y o  SEX: female [id number]      Patient Location     [facility]    Patients care was coordinated with nursing facility staff  Recent vitals, labs and updated medications were reviewed on [facility]  Past Medical, surgical, social, medication and allergy history and patients previous records reviewed  Assessment/Plan:    UTI (urinary tract infection)  Patient was recently hospitalized with increased confusion and weakness  Urine culture revealed greater than 100,000 colonies of E coli  Patient completed ceftriaxone post prior to discharge  Mental status subsequently improved    COVID-19 infection:  Patient tested positive for COVID-19 on routine screening prior to discharge  Remains asymptomatic except for mild cough     SaO2 is 96% on room air  Will continue to monitor  Continue p r n  Tessalon Perles    Acute encephalopathy:  Patient was hospitalized recently with confusion generalized weakness and fatigue  Urine culture revealed E coli  Patient completed ceftriaxone course prior to treatment  Altered mental status was attributed to UTI  CT head revealed no acute intracranial abnormality  Patient was noted to have stable postop changes related to left paraclinoid aneurysm clipping, chronic bilateral basal ganglia lacunar infarct and chronic microangiopathic ischemic changes  There was also evidence of persistent moderate ventriculomegaly slightly out of proportion to central and cortical volume loss which would be seen with exaggerated ex vacuo dilatation related to volume loss versus normal pressure hydrocephalus  Continue fall precautions  Continue PT OT    Anemia:  Recent hemoglobin was 9 7  Workup was consistent with iron deficiency  Continue iron supplements    Repeat hemoglobin was 10 1     Hypertension:  Blood pressure was recently noted to be on the lower side  Patient was taken off of amlodipine  Blood pressure is currently stable at 1 18/76  Will continue to monitor  Keep off of antihypertensives  Avoid hypotension considering advanced age    Hypothyroidism:  Continue levothyroxine  Lab Results   Component Value Date    [id number] 1 685 09/24/2022       Hyperlipidemia:  Patient remains on simvastatin  Last LDL was 79 with total cholesterol of 137    CKD :  Creatinine has been ranging between 1 06-1 19  Maintain adequate hydration  Avoid hypotension and nephrotoxins  Chief Complaint     Recent hospitalization for altered mental status, UTI, ambulatory dysfunction, COVID-19 infection    HPI       Patient is a 80 y o  female with past medical history significant for anemia, CKD, hyperlipidemia hypertension and hypothyroidism  Patient was hospitalized on 09/24/2022 with altered mental status  Workup was consistent with UTI  Patient was treated with ceftriaxone  Urine culture revealed greater than 100,000 colonies of E coli   Patient was seen by PT OT services  She was noted to have unsteady gait  CT head was obtained revealing no acute intracranial abnormality  Patient was noted to have persistent moderate ventriculomegaly suspected to be from volume loss versus normal pressure hydrocephalus  Continued PT was recommended  Patient was subsequently discharged to [facility] for rehab  Of note patient tested positive for COVID-19 on routine screening prior to discharge  Patient remains asymptomatic with respect to above  Hospital course was also significant for borderline low blood pressures for which antihypertensive medications were held    At the time of my evaluation today patient appears comfortable in no distress  Past Medical History:   Diagnosis Date    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    CKD (chronic kidney disease)     HLD (hyperlipidemia)     HTN (hypertension)     Hypothyroidism        Surgical history:  No pertinent past surgical history per records    Social History     Tobacco Use   Smoking Status Never Smoker   Smokeless Tobacco Never Used     Family history :  NA    No Known Allergies       Current Outpatient Medications:     benzonatate (TESSALON PERLES) 100 mg capsule, Take 1 capsule (100 mg total) by mouth 3 (three) times a day as needed for cough, Disp: 20 capsule, Rfl: 0    ferrous sulfate 324 (65 Fe) mg, Take 1 tablet (324 mg total) by mouth every other day, Disp: 15 tablet, Rfl: 0    levothyroxine 25 mcg tablet, Take 25 mcg by mouth daily, Disp: , Rfl:     polyethylene glycol (MIRALAX) 17 g packet, Take 17 g by mouth daily as needed (constipation), Disp: 12 each, Rfl: 0    simvastatin (ZOCOR) 20 mg tablet, Take 20 mg by mouth daily, Disp: , Rfl:   No current facility-administered medications for this visit  Updated list was reviewed in [facility] system of facility  Vitals:    10/01/22 1207   BP: 118/76   Pulse: 76   Resp: 18   Temp: 97 6 °F (36 4 °C)   SpO2: 96%       Vital signs were reviewed in point [facility] care    Review of Systems   Constitutional: Positive for fatigue  Negative for chills and fever  Sleepy but arousable   HENT: Negative for nosebleeds and rhinorrhea  Eyes: Negative for discharge and redness  Respiratory: Negative for cough, shortness of breath, wheezing and stridor  Cardiovascular: Negative for chest pain and leg swelling  Gastrointestinal: Negative for abdominal distention, abdominal pain, diarrhea and vomiting  Genitourinary: Negative for hematuria  Musculoskeletal: Positive for arthralgias (Pain involving LLE) and gait problem  Negative for back pain  Skin: Negative for pallor  Neurological: Positive for weakness (Generalized)  Negative for tremors, seizures and syncope  Psychiatric/Behavioral: Positive for confusion  Negative for agitation and behavioral problems         Physical Exam  Constitutional:       General: She is not in acute distress  Appearance: She is well-developed  She is not diaphoretic  Comments: Thin appearing weak and frail  HENT:      Head: Normocephalic and atraumatic  Eyes:      General: No scleral icterus  Right eye: No discharge  Left eye: No discharge  Cardiovascular:      Rate and Rhythm: Normal rate and regular rhythm  Pulmonary:      Effort: No respiratory distress  Breath sounds: No stridor  No wheezing  Abdominal:      Palpations: Abdomen is soft  Tenderness: There is no abdominal tenderness  There is no guarding  Musculoskeletal:      Cervical back: Neck supple  Right lower leg: No edema  Left lower leg: No edema  Comments: Muscle wasting involving all extremities and supraclavicular area   Skin:     Coloration: Skin is not jaundiced or pale  Neurological:      General: No focal deficit present  Mental Status: She is alert  Cranial Nerves: No cranial nerve deficit  Motor: Weakness present  Comments: Knows the current year and president  Does not remember the month correctly   Psychiatric:         Mood and Affect: Mood normal          Behavior: Behavior normal        Diagnostic Data       Recent labs and imaging studies were reviewed    Lab Results   Component Value Date    WBC 4 70 09/29/2022    HGB 9 8 (L) 09/29/2022    HCT 32 5 (L) 09/29/2022    MCV 76 (L) 09/29/2022     09/29/2022      Lab Results   Component Value Date    SODIUM 138 09/29/2022    K 4 2 09/29/2022     09/29/2022    CO2 30 09/29/2022    BUN 18 09/29/2022    CREATININE 1 15 09/29/2022    GLUC 89 09/29/2022    CALCIUM 8 2 (L) 09/29/2022         Code Status:      DNI, DNR               This note was electronically signed by Dr Josefa Montiel

## 2022-10-01 NOTE — ASSESSMENT & PLAN NOTE
Patient was recently hospitalized with increased confusion and weakness  Urine culture revealed greater than 100,000 colonies of E coli  Patient completed ceftriaxone post prior to discharge    Mental status subsequently improved

## 2022-10-04 ENCOUNTER — NURSING HOME VISIT (OUTPATIENT)
Dept: GERIATRICS | Facility: OTHER | Age: 87
End: 2022-10-04
Payer: MEDICARE

## 2022-10-04 VITALS
OXYGEN SATURATION: 95 % | RESPIRATION RATE: 18 BRPM | DIASTOLIC BLOOD PRESSURE: 68 MMHG | HEART RATE: 70 BPM | TEMPERATURE: 97.6 F | SYSTOLIC BLOOD PRESSURE: 120 MMHG | WEIGHT: 119.5 LBS | BODY MASS INDEX: 19.89 KG/M2

## 2022-10-04 DIAGNOSIS — N39.0 URINARY TRACT INFECTION WITHOUT HEMATURIA, SITE UNSPECIFIED: Primary | ICD-10-CM

## 2022-10-04 PROCEDURE — 99309 SBSQ NF CARE MODERATE MDM 30: CPT | Performed by: INTERNAL MEDICINE

## 2022-10-04 NOTE — PROGRESS NOTES
[facility]  [address] [place] [address], [address] [facility]  [id number]    Progress note    NAME: Vonda Dinero  AGE: 80 y o  SEX: female [id number]      Patient Location     [facility]      Assessment/Plan:    No problem-specific Assessment & Plan notes found for this encounter  COVID-19 infection:  Patient tested positive for COVID-19 on routine screening prior to discharge  Asymptomatic at present  SaO2 stable at 95% on room air    Acute encephalopathy:  Patient was hospitalized recently with acute encephalopathy attributed to UTI  Patient completed ceftriaxone course prior to discharge  Mental status appears to have improved  CT head revealed no acute intracranial abnormality, postop changes related to left paraclinoid aneurysm clipping, chronic bilateral basal ganglia lacunar infarct and chronic microangiopathic ischemic changes were noted  There was also evidence of persistent moderate ventriculomegaly slightly out of proportion to central and cortical volume loss which would be seen with exaggerated ex vacuo dilatation related to volume loss versus normal pressure hydrocephalus  Continue fall precautions  Continue PT OT    Anemia:  Hemoglobin was 11 on 10/03/2022  Improved from few days ago  Continue iron supplements    Hypertension:  Amlodipine was recently discontinued due to low blood pressure  Blood pressure is currently stable at 1 20/68 off antihypertensives    Hypothyroidism:  Continue levothyroxine  Lab Results   Component Value Date    [id number] 1 685 09/24/2022       Hyperlipidemia:  Patient remains on simvastatin  Last LDL was 79 with total cholesterol of 137    CKD :  Creatinine was stable at 1 10 on 10/03/2022  Maintain adequate hydration  Avoid hypotension and nephrotoxin    Reason for visit:  F/U UTI, ambulatory dysfunction, COVID-19 infection, anemia, HTN, Hypothyroidism    HPI     Patient is being seen for a follow-up visit today    She is doing okay at present  Appears comfortable in no distress  Denies any active complaints  No fever chills dyspnea vomiting or diarrhea  Patient remains afebrile    Past Medical History:   Diagnosis Date    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    CKD (chronic kidney disease)     HLD (hyperlipidemia)     HTN (hypertension)     Hypothyroidism        Surgical history:  No pertinent past surgical history per records    Social History     Tobacco Use   Smoking Status Never Smoker   Smokeless Tobacco Never Used     Family history :  NA    No Known Allergies       Current Outpatient Medications:     benzonatate (TESSALON PERLES) 100 mg capsule, Take 1 capsule (100 mg total) by mouth 3 (three) times a day as needed for cough, Disp: 20 capsule, Rfl: 0    ferrous sulfate 324 (65 Fe) mg, Take 1 tablet (324 mg total) by mouth every other day, Disp: 15 tablet, Rfl: 0    levothyroxine 25 mcg tablet, Take 25 mcg by mouth daily, Disp: , Rfl:     polyethylene glycol (MIRALAX) 17 g packet, Take 17 g by mouth daily as needed (constipation), Disp: 12 each, Rfl: 0    simvastatin (ZOCOR) 20 mg tablet, Take 20 mg by mouth daily, Disp: , Rfl:     Updated list was reviewed in [facility] system of facility  Vitals:    10/04/22 1711   BP: 120/68   Pulse: 70   Resp: 18   Temp: 97 6 °F (36 4 °C)   SpO2: 95%       Vital signs were reviewed in point [facility] care    Review of Systems   Constitutional: Positive for fatigue  Negative for chills and fever  Respiratory: Negative for cough, shortness of breath and wheezing  Cardiovascular: Negative for chest pain and leg swelling  Gastrointestinal: Negative for abdominal distention, abdominal pain, diarrhea and vomiting  Genitourinary: Negative for hematuria  Musculoskeletal: Positive for gait problem  Negative for back pain  Skin: Negative for pallor  Neurological: Positive for weakness (Generalized)  Negative for tremors and syncope     Psychiatric/Behavioral: Positive for confusion  Negative for agitation and behavioral problems  Physical Exam  Constitutional:       General: She is not in acute distress  Appearance: She is well-developed  Comments: Thin appearing   HENT:      Head: Normocephalic and atraumatic  Eyes:      General:         Right eye: No discharge  Left eye: No discharge  Cardiovascular:      Rate and Rhythm: Normal rate and regular rhythm  Pulmonary:      Effort: No respiratory distress  Breath sounds: No stridor  No wheezing  Abdominal:      Palpations: Abdomen is soft  Tenderness: There is no abdominal tenderness  There is no guarding  Musculoskeletal:      Cervical back: Neck supple  Right lower leg: No edema  Left lower leg: No edema  Comments: Muscle wasting involving all extremities and supraclavicular area   Skin:     Coloration: Skin is not jaundiced or pale  Neurological:      General: No focal deficit present  Mental Status: She is alert  Cranial Nerves: No cranial nerve deficit  Motor: Weakness present  Comments: Knows the current year  Does not remember the month    Psychiatric:         Mood and Affect: Mood normal          Behavior: Behavior normal        Diagnostic Data     Labs done on 10/03/2022 revealed hemoglobin of 11, WBC count 5 3, platelet count 430  BUN 19, creatinine 1 10, sodium 142, potassium 4 7    Recent labs and imaging studies were reviewed    Lab Results   Component Value Date    WBC 4 70 09/29/2022    HGB 9 8 (L) 09/29/2022    HCT 32 5 (L) 09/29/2022    MCV 76 (L) 09/29/2022     09/29/2022      Lab Results   Component Value Date    SODIUM 138 09/29/2022    K 4 2 09/29/2022     09/29/2022    CO2 30 09/29/2022    BUN 18 09/29/2022    CREATININE 1 15 09/29/2022    GLUC 89 09/29/2022    CALCIUM 8 2 (L) 09/29/2022         Code Status:      DNI, DNR               This note was electronically signed by Dr Rick Leung

## 2022-10-05 ENCOUNTER — NURSING HOME VISIT (OUTPATIENT)
Dept: WOUND CARE | Facility: HOSPITAL | Age: 87
End: 2022-10-05
Payer: MEDICARE

## 2022-10-05 DIAGNOSIS — R21 EXCORIATED RASH: Primary | ICD-10-CM

## 2022-10-05 PROCEDURE — 99304 1ST NF CARE SF/LOW MDM 25: CPT | Performed by: NURSE PRACTITIONER

## 2022-10-05 NOTE — PROGRESS NOTES
[address] [facility]NAGEMENT   [facility]       Patient ID: Zeina Degroot is a 80 y o  female Date of Birth 1/10/1928     Location of Service: [address] [place] [address]    Performed wound round with: Wound team     Chief Complaint : Sacrum    Wound Instructions:  Wound:  Sacrum and buttocks  Discontinue previous wound order  Cleanse the wound bed with soap and water, pat dry  Apply hydraguard to wound bed  Frequency : twice a day and prn for soiling  Offload all wounds  Turn and reposition frequently, maximum of every two hours  Instruct / Assist with weight shifting every 15 - 20 minutes when in chair  Increase protein intake  Monitor for any sign of infection or worsening, inform PCP or patient's primary physician in your facility  Allergies  Patient has no known allergies  Assessment & Plan:  1  Excoriated rash  Assessment & Plan:  Sacrum  - excoriated, no obvious sign of infection  - ordered hydraguard for moisture management  - continue to reposition when in bed  - Sign off  Facility staff will continue to provide treatment and monitor the wound  Can reconsult wound nurse practitioner if wound failed to heal or worsened  Subjective:   10/5/2022This is a 80 y o , female referred to our service for wound/ skin alterations on sacrum  Patient have a complex medical history including but not limited to  Aneurysm ([address] ), CKD (chronic kidney disease), HLD (hyperlipidemia), HTN (hypertension), and Hypothyroidism    Patient was referred by Senior Care Team  Patient was seen in collaboration with the facility wound team      Received patient in bed, seems comfortable  Patient is a poor historian and was not able to provide information related to the wound  As per staff, patient was admitted with excoriation on the sacrum  No current treatment  Review of Systems   Constitutional: Negative  HENT: Negative  Eyes: Negative  Respiratory: Negative      Cardiovascular: Negative for chest pain and leg swelling  Gastrointestinal: Negative  Endocrine: Negative  Genitourinary: Negative  Musculoskeletal: Positive for gait problem  Skin: Positive for wound  See HPI   Neurological: Negative for dizziness and headaches  Psychiatric/Behavioral: Negative for behavioral problems  Objective:    Physical Exam  Constitutional:       Appearance: Normal appearance  HENT:      Head: Normocephalic and atraumatic  Nose: Nose normal       Mouth/Throat:      Pharynx: Oropharynx is clear  Eyes:      Conjunctiva/sclera: Conjunctivae normal    Cardiovascular:      Rate and Rhythm: Normal rate  Pulmonary:      Effort: Pulmonary effort is normal    Abdominal:      Tenderness: There is no abdominal tenderness  There is no guarding  Genitourinary:     Comments: incontinent  Musculoskeletal:         General: No tenderness  Cervical back: Normal range of motion  Right lower leg: No edema  Left lower leg: No edema  Comments: LROM   Skin:     Findings: Lesion present  Comments: Sacrum - excoriated, no drainage, no obvious sign of infection   Neurological:      Mental Status: She is alert  Gait: Gait abnormal    Psychiatric:         Mood and Affect: Mood normal          Behavior: Behavior normal               Procedures           Patient's care was coordinated with nursing facility staff  Recent vitals, labs and updated medications were reviewed on EMR or chart system of facility  Past Medical, surgical, social, medication and allergy history and patient's previous records were reviewed and updated as appropriate: Most up-to date information is available in the facility EMR where the patient is currently admitted      Patient Active Problem List   Diagnosis    HLD (hyperlipidemia)    Hypothyroidism    CKD (chronic kidney disease)    HTN (hypertension)    Acute encephalopathy    Anemia    Bilateral lower extremity edema    UTI (urinary tract infection)    COVID    Excoriated rash     Past Medical History:   Diagnosis Date    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    CKD (chronic kidney disease)     HLD (hyperlipidemia)     HTN (hypertension)     Hypothyroidism      History reviewed  No pertinent surgical history  Social History     Socioeconomic History    Marital status: Single     Spouse name: None    Number of children: None    Years of education: None    Highest education level: None   Occupational History    None   Tobacco Use    Smoking status: Never Smoker    Smokeless tobacco: Never Used   Vaping Use    Vaping Use: Never used   Substance and Sexual Activity    Alcohol use: Never    Drug use: Never    Sexual activity: None   Other Topics Concern    None   Social History Narrative    None     Social Determinants of Health     Financial Resource Strain: Not on file   Food Insecurity: Not on file   Transportation Needs: No Transportation Needs    Lack of Transportation (Medical): No    Lack of Transportation (Non-Medical): No   Physical Activity: Not on file   Stress: Not on file   Social Connections: Not on file   Intimate Partner Violence: Not on file   Housing Stability: Not on file        Current Outpatient Medications:     benzonatate (TESSALON PERLES) 100 mg capsule, Take 1 capsule (100 mg total) by mouth 3 (three) times a day as needed for cough, Disp: 20 capsule, Rfl: 0    ferrous sulfate 324 (65 Fe) mg, Take 1 tablet (324 mg total) by mouth every other day, Disp: 15 tablet, Rfl: 0    levothyroxine 25 mcg tablet, Take 25 mcg by mouth daily, Disp: , Rfl:     polyethylene glycol (MIRALAX) 17 g packet, Take 17 g by mouth daily as needed (constipation), Disp: 12 each, Rfl: 0    simvastatin (ZOCOR) 20 mg tablet, Take 20 mg by mouth daily, Disp: , Rfl:   History reviewed  No pertinent family history  Coordination of Care: Wound team aware of the treatment plan   Facility nurse will provide wound treatment and monitor the wound for any changes  Patient / Staff education : Patient / Staff was given education on sign of infection and pressure ulcer prevention  Patient/ Staff verbalized understanding     Follow up :  Sign off    Voice-recognition software may have been used in the preparation of this document  Occasional wrong word or "sound-alike" substitutions may have occurred due to the inherent limitations of voice recognition software  Interpretation should be guided by context        Radha Shape

## 2022-10-05 NOTE — ASSESSMENT & PLAN NOTE
Sacrum  - excoriated, no obvious sign of infection  - ordered hydraguard for moisture management  - continue to reposition when in bed  - Sign off  Facility staff will continue to provide treatment and monitor the wound  Can reconsult wound nurse practitioner if wound failed to heal or worsened

## 2022-10-09 ENCOUNTER — NURSING HOME VISIT (OUTPATIENT)
Dept: GERIATRICS | Facility: OTHER | Age: 87
End: 2022-10-09
Payer: MEDICARE

## 2022-10-09 VITALS
WEIGHT: 114 LBS | SYSTOLIC BLOOD PRESSURE: 116 MMHG | HEART RATE: 84 BPM | OXYGEN SATURATION: 95 % | TEMPERATURE: 96.8 F | DIASTOLIC BLOOD PRESSURE: 63 MMHG | BODY MASS INDEX: 18.97 KG/M2

## 2022-10-09 DIAGNOSIS — D64.9 ANEMIA, UNSPECIFIED TYPE: ICD-10-CM

## 2022-10-09 DIAGNOSIS — E03.9 HYPOTHYROIDISM, UNSPECIFIED TYPE: ICD-10-CM

## 2022-10-09 DIAGNOSIS — G93.40 ACUTE ENCEPHALOPATHY: Primary | ICD-10-CM

## 2022-10-09 DIAGNOSIS — N18.9 CHRONIC KIDNEY DISEASE, UNSPECIFIED CKD STAGE: ICD-10-CM

## 2022-10-09 DIAGNOSIS — R26.2 AMBULATORY DYSFUNCTION: ICD-10-CM

## 2022-10-09 DIAGNOSIS — I10 HYPERTENSION, UNSPECIFIED TYPE: ICD-10-CM

## 2022-10-09 PROCEDURE — 99306 1ST NF CARE HIGH MDM 50: CPT | Performed by: FAMILY MEDICINE

## 2022-10-10 ENCOUNTER — NURSING HOME VISIT (OUTPATIENT)
Dept: GERIATRICS | Facility: OTHER | Age: 87
End: 2022-10-10
Payer: MEDICARE

## 2022-10-10 DIAGNOSIS — N18.9 CHRONIC KIDNEY DISEASE, UNSPECIFIED CKD STAGE: ICD-10-CM

## 2022-10-10 DIAGNOSIS — G93.40 ACUTE ENCEPHALOPATHY: Primary | ICD-10-CM

## 2022-10-10 DIAGNOSIS — I10 HYPERTENSION, UNSPECIFIED TYPE: ICD-10-CM

## 2022-10-10 DIAGNOSIS — R26.2 AMBULATORY DYSFUNCTION: ICD-10-CM

## 2022-10-10 PROBLEM — G91.2 NORMAL PRESSURE HYDROCEPHALUS (HCC): Status: ACTIVE | Noted: 2022-10-10

## 2022-10-10 PROCEDURE — 99309 SBSQ NF CARE MODERATE MDM 30: CPT | Performed by: NURSE PRACTITIONER

## 2022-10-10 NOTE — ASSESSMENT & PLAN NOTE
She be hypertensive when she was hospitalized  Her home blood pressure medications were withheld currently is stable

## 2022-10-10 NOTE — ASSESSMENT & PLAN NOTE
Acute encephalopathy was multifactorial   She was diagnosed with COVID and UTI during her hospital stay  He currently she is back to her baseline she is oriented only to self  Currently she denies any complaints of urinary issues  She was treated symptomatically for her COVID infection

## 2022-10-10 NOTE — ASSESSMENT & PLAN NOTE
Lab Results   Component Value Date    EGFR 40 09/29/2022    EGFR 39 09/28/2022    EGFR 45 09/27/2022    CREATININE 1 15 09/29/2022    CREATININE 1 19 09/28/2022    CREATININE 1 06 09/27/2022   Continue monitoring creatinine levels  Avoid nephrotoxic agents

## 2022-10-13 ENCOUNTER — NURSING HOME VISIT (OUTPATIENT)
Dept: GERIATRICS | Facility: OTHER | Age: 87
End: 2022-10-13
Payer: MEDICARE

## 2022-10-13 DIAGNOSIS — I10 HYPERTENSION, UNSPECIFIED TYPE: Primary | ICD-10-CM

## 2022-10-13 DIAGNOSIS — N18.9 CHRONIC KIDNEY DISEASE, UNSPECIFIED CKD STAGE: ICD-10-CM

## 2022-10-13 DIAGNOSIS — D64.9 ANEMIA, UNSPECIFIED TYPE: ICD-10-CM

## 2022-10-13 DIAGNOSIS — R26.2 AMBULATORY DYSFUNCTION: ICD-10-CM

## 2022-10-13 DIAGNOSIS — E03.9 HYPOTHYROIDISM, UNSPECIFIED TYPE: ICD-10-CM

## 2022-10-13 PROCEDURE — 99309 SBSQ NF CARE MODERATE MDM 30: CPT | Performed by: NURSE PRACTITIONER

## 2022-10-13 NOTE — ASSESSMENT & PLAN NOTE
· Encephalopathy appears to have been multifactorial including UTI and COVID-19 infection with improved mental status status post antibiotic therapy  · Continue to monitor BMP, CBC, nutritional status, mental status  · Continue to monitor for acute changes in condition

## 2022-10-13 NOTE — PROGRESS NOTES
[address]  [address] [facility]  [phone]  [facility]  [address]  PROGRESS NOTE        NAME: Mary Ann Benítez  AGE: 80 y o  SEX: female  :  1/10/1928  DATE OF ENCOUNTER: 10/10/2022    Chief Complaint   Patient seen and examined for follow up on chronic conditions  History of Present Illness     Mary Ann Benítez is a patient of [facility] with acute and chronic medical conditions of hypothyroidism, hypertension, acute encephalopathy, CKD, UTI, anemia, lower extremity edema, and ambulatory dysfunction  The patient is being seen and examined today for follow-up on acute and chronic medical conditions  Upon examination, the patient is , cooperative, and in no acute distress  She denies pain, sob, chest pain, abdominal pain, fever, chills, nausea/vomiting, diarrhea/constipation, or dysuria  The patient reports she has a fair appetite and is drinking an adequate amount of fluids  The patient offers no concerns today  PT/OT have been providing therapy treatment for restorative services  The following portions of the patient's history were reviewed and updated as appropriate: allergies, current medications, past family history, past medical history, past social history, past surgical history and problem list     Review of Systems     A 12-point comprehensive review of symptoms was obtained with pertinent positives and negatives noted per HPI  History     Past Medical History:   Diagnosis Date   • 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   • CKD (chronic kidney disease)    • HLD (hyperlipidemia)    • HTN (hypertension)    • Hypothyroidism      No past surgical history on file  No family history on file    Social History     Socioeconomic History   • Marital status: Single     Spouse name: Not on file   • Number of children: Not on file   • Years of education: Not on file   • Highest education level: Not on file   Occupational History   • Not on file   Tobacco Use   • Smoking status: Never Smoker   • Smokeless tobacco: Never Used   Vaping Use   • Vaping Use: Never used   Substance and Sexual Activity   • Alcohol use: Never   • Drug use: Never   • Sexual activity: Not on file   Other Topics Concern   • Not on file   Social History Narrative   • Not on file     Social Determinants of Health     Financial Resource Strain: Not on file   Food Insecurity: Not on file   Transportation Needs: No Transportation Needs   • Lack of Transportation (Medical): No   • Lack of Transportation (Non-Medical): No   Physical Activity: Not on file   Stress: Not on file   Social Connections: Not on file   Intimate Partner Violence: Not on file   Housing Stability: Not on file     No Known Allergies    Objective     Vital Signs  BP:  118/60       HR:  62 T:  98    RR:  17 O2Sat:  95% W:  113 6  General: NAD, Well Nourished, Well Developed, Obese  Oral: Oropharynx Moist and Clear  Neck: Supple, +ROM  CV: S1, S2, normal rate, regular rhythm, + murmur appreciated  Pulmonary: Lung sounds clear to air, wheezing,rhonchi, rales, b/l fields diminished  Abdominal:BS + x4 in all quadrants, soft, no mass, no tenderness  Extremities: LLE mild non-pitting edema, +ROM, +weakness,   Skin: Warm, Dry, right arm abrasion, no rash, no erythema present, no ecchymosis present  Neurological/Psych: Alert and oriented times 3, normal mood, no affect, fair judgement    Pertinent Laboratory/Diagnostic Studies have been independently reviewed  Current Medications     Current Medications Reviewed and updated in Nursing Home EMR      Assessment and Plan     Acute encephalopathy  · Encephalopathy appears to have been multifactorial including UTI and COVID-19 infection with improved mental status status post antibiotic therapy  · Continue to monitor BMP, CBC, nutritional status, mental status  · Continue to monitor for acute changes in condition      HTN (hypertension)  · Patient home blood pressure medications were held in hospital due to hypotensive readings  · Patient's BP are within acceptable range without medication  · Encourage p o  Fluid intake  · Continue to monitor BP    CKD (chronic kidney disease)  Lab Results   Component Value Date    EGFR 40 09/29/2022    EGFR 39 09/28/2022    EGFR 45 09/27/2022    CREATININE 1 15 09/29/2022    CREATININE 1 19 09/28/2022    CREATININE 1 06 09/27/2022   · Will continue to monitor kidney function via BMP  · Avoid NSAIDs and nephrotoxic agents  · Encourage p o   Fluid intake    Ambulatory dysfunction  • Maintain fall and safety precautions  • Encourage use of call bell and asst devices  • Continue PT/OT services  • Assist with transfers, mobility, and ADLs        [address] [facility]  Geriatric Medicine  10/10/2022

## 2022-10-13 NOTE — ASSESSMENT & PLAN NOTE
• Maintain fall and safety precautions  • Encourage use of call bell and asst devices  • Continue PT/OT services  • Assist with transfers, mobility, and ADLs

## 2022-10-13 NOTE — ASSESSMENT & PLAN NOTE
Lab Results   Component Value Date    EGFR 40 09/29/2022    EGFR 39 09/28/2022    EGFR 45 09/27/2022    CREATININE 1 15 09/29/2022    CREATININE 1 19 09/28/2022    CREATININE 1 06 09/27/2022   · Will continue to monitor kidney function via BMP  · Avoid NSAIDs and nephrotoxic agents  · Encourage p o   Fluid intake

## 2022-10-13 NOTE — ASSESSMENT & PLAN NOTE
· Patient home blood pressure medications were held in hospital due to hypotensive readings  · Patient's BP are within acceptable range without medication  · Encourage p o   Fluid intake  · Continue to monitor BP

## 2022-10-16 NOTE — PROGRESS NOTES
[address]  [address] [facility]  [phone]  [facility]  [address]  PROGRESS NOTE        NAME: Chely Waddell  AGE: 80 y o  SEX: female  :  1/10/1928  DATE OF ENCOUNTER: 10/13/2022    Chief Complaint   Patient seen and examined for follow up on chronic conditions  History of Present Illness     Chely Waddell is a patient of [facility] with acute and chronic medical conditions of hypothyroidism, hypertension, acute encephalopathy, CKD, UTI, anemia, lower extremity edema, and ambulatory dysfunction  The patient is being seen and examined today for follow-up on acute and chronic medical conditions  Upon examination, the patient is , cooperative, and in no acute distress  She denies pain, sob, chest pain, abdominal pain, fever, chills, nausea/vomiting, diarrhea/constipation, or dysuria  The patient reports she has a fair appetite and is drinking an adequate amount of fluids  The patient offers no concerns today  PT/OT have been providing therapy treatment for restorative services  The following portions of the patient's history were reviewed and updated as appropriate: allergies, current medications, past family history, past medical history, past social history, past surgical history and problem list     Review of Systems     A 12-point comprehensive review of symptoms was obtained with pertinent positives and negatives noted per HPI  History     Past Medical History:   Diagnosis Date   • 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   • CKD (chronic kidney disease)    • HLD (hyperlipidemia)    • HTN (hypertension)    • Hypothyroidism      No past surgical history on file  No family history on file    Social History     Socioeconomic History   • Marital status: Single     Spouse name: Not on file   • Number of children: Not on file   • Years of education: Not on file   • Highest education level: Not on file   Occupational History   • Not on file   Tobacco Use   • Smoking status: Never Smoker   • Smokeless tobacco: Never Used   Vaping Use   • Vaping Use: Never used   Substance and Sexual Activity   • Alcohol use: Never   • Drug use: Never   • Sexual activity: Not on file   Other Topics Concern   • Not on file   Social History Narrative   • Not on file     Social Determinants of Health     Financial Resource Strain: Not on file   Food Insecurity: Not on file   Transportation Needs: No Transportation Needs   • Lack of Transportation (Medical): No   • Lack of Transportation (Non-Medical): No   Physical Activity: Not on file   Stress: Not on file   Social Connections: Not on file   Intimate Partner Violence: Not on file   Housing Stability: Not on file     No Known Allergies    Objective     Vital Signs  BP:  136/75       HR:  82 T:  98    RR:  18 O2Sat:  97% W:  123 2  General: NAD, Well Nourished, Well Developed, Obese  Oral: Oropharynx Moist and Clear  Neck: Supple, +ROM  CV: S1, S2, normal rate, regular rhythm, + murmur appreciated  Pulmonary: Lung sounds clear to air, wheezing,rhonchi, rales, b/l fields diminished  Abdominal:BS + x4 in all quadrants, soft, no mass, no tenderness  Extremities: LLE mild non-pitting edema, +ROM, +weakness,   Skin: Warm, Dry, right arm abrasion, no rash, no erythema present, no ecchymosis present  Neurological/Psych: Alert and oriented times 2, normal mood, no affect, fair judgement       Pertinent Laboratory/Diagnostic Studies have been independently reviewed  Current Medications     Current Medications Reviewed and updated in Nursing Home EMR      Assessment and Plan     HTN (hypertension)  · Patient's blood pressure continues to be stable without any medication  · Continue to monitor BP  · Continue to encourage p o  fluid intake    Hypothyroidism  · Continue levothyroxine 25 mcg p o  daily    CKD (chronic kidney disease)  · Last Creatinine 1 07 and Gfr 48  · Will continue to monitor BMP  · Avoid nephrotoxins and NSAIDS  · Avoid hypotension  · Encourage PO fluid intake      Anemia  · Hgb 9 0/Hct 28 8 on 10/10  · Will continue to monitor CBC    Ambulatory dysfunction  • Maintain fall and safety precautions  • Encourage use of call bell and asst devices  • Continue PT/OT services  • Assist with transfers, mobility, and ADLs        [facility]  Geriatric Medicine  10/13/2022

## 2022-10-16 NOTE — ASSESSMENT & PLAN NOTE
· Patient's blood pressure continues to be stable without any medication  · Continue to monitor BP  · Continue to encourage p o  fluid intake

## 2022-10-16 NOTE — ASSESSMENT & PLAN NOTE
· Last Creatinine 1 07 and Gfr 48  · Will continue to monitor BMP  · Avoid nephrotoxins and NSAIDS  · Avoid hypotension  · Encourage PO fluid intake

## 2022-11-01 ENCOUNTER — NURSING HOME VISIT (OUTPATIENT)
Dept: WOUND CARE | Facility: HOSPITAL | Age: 87
End: 2022-11-01

## 2022-11-01 DIAGNOSIS — R21 EXCORIATED RASH: Primary | ICD-10-CM

## 2022-11-01 NOTE — PROGRESS NOTES
[address] [facility]NAGEMENT   [facility]       Patient ID: Katie Prader is a 80 y o  female Date of Birth 1/10/1928     Location of Service: [address] [place] [address]    Performed wound round with: Wound team     Chief Complaint : Sacrum    Wound Instructions:  Wound:  Sacrum and buttocks  Discontinue previous wound order  Cleanse the wound bed with soap and water, pat dry  Apply hydraguard to wound bed  Frequency : twice a day and prn for soiling  Offload all wounds  Turn and reposition frequently, maximum of every two hours  Instruct / Assist with weight shifting every 15 - 20 minutes when in chair  Increase protein intake  Monitor for any sign of infection or worsening, inform PCP or patient's primary physician in your facility  Allergies  Patient has no known allergies  Assessment & Plan:  1  Excoriated rash  Assessment & Plan:  Sacrum  - healed  - ordered hydraguard for moisture management  - continue to reposition when in bed  - Sign off  Facility staff will continue to provide treatment and monitor the wound  Can reconsult wound nurse practitioner if wound failed to heal or worsened  Subjective:   10/5/2022This is a 80 y o , female referred to our service for wound/ skin alterations on sacrum  Patient have a complex medical history including but not limited to  Aneurysm ([address] ), CKD (chronic kidney disease), HLD (hyperlipidemia), HTN (hypertension), and Hypothyroidism    Patient was referred by Senior Care Team  Patient was seen in collaboration with the facility wound team      Received patient in bed, seems comfortable  Patient is a poor historian and was not able to provide information related to the wound  As per staff, patient was admitted with excoriation on the sacrum  No current treatment  11/1/2022 Follow up for wound on the sacrum   Received patient, not in distress  Facility staff did not report any significant issues related to the wound   Denies pain           Review of Systems   Constitutional: Negative  HENT: Negative  Eyes: Negative  Respiratory: Negative  Cardiovascular: Negative for chest pain and leg swelling  Gastrointestinal: Negative  Endocrine: Negative  Genitourinary: Negative  Musculoskeletal: Positive for gait problem  Skin: Positive for wound  See HPI   Neurological: Negative for dizziness and headaches  Psychiatric/Behavioral: Negative for behavioral problems  Objective:    Physical Exam  Constitutional:       Appearance: Normal appearance  HENT:      Head: Normocephalic and atraumatic  Nose: Nose normal       Mouth/Throat:      Pharynx: Oropharynx is clear  Eyes:      Conjunctiva/sclera: Conjunctivae normal    Pulmonary:      Effort: Pulmonary effort is normal    Abdominal:      Tenderness: There is no abdominal tenderness  There is no guarding  Genitourinary:     Comments: incontinent  Musculoskeletal:         General: No tenderness  Cervical back: Normal range of motion  Right lower leg: No edema  Left lower leg: No edema  Comments: LROM   Skin:     Findings: No lesion  Comments: Sacrum - dry, healed, no open area   Neurological:      Mental Status: She is alert  Gait: Gait abnormal    Psychiatric:         Mood and Affect: Mood normal          Behavior: Behavior normal               Procedures           Patient's care was coordinated with nursing facility staff  Recent vitals, labs and updated medications were reviewed on EMR or chart system of facility  Past Medical, surgical, social, medication and allergy history and patient's previous records were reviewed and updated as appropriate: Most up-to date information is available in the facility EMR where the patient is currently admitted      Patient Active Problem List   Diagnosis   • HLD (hyperlipidemia)   • Hypothyroidism   • CKD (chronic kidney disease)   • HTN (hypertension)   • Acute encephalopathy   • Anemia   • Bilateral lower extremity edema   • UTI (urinary tract infection)   • COVID   • Excoriated rash   • Ambulatory dysfunction     Past Medical History:   Diagnosis Date   • 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   • CKD (chronic kidney disease)    • HLD (hyperlipidemia)    • HTN (hypertension)    • Hypothyroidism      History reviewed  No pertinent surgical history  Social History     Socioeconomic History   • Marital status: Single     Spouse name: None   • Number of children: None   • Years of education: None   • Highest education level: None   Occupational History   • None   Tobacco Use   • Smoking status: Never Smoker   • Smokeless tobacco: Never Used   Vaping Use   • Vaping Use: Never used   Substance and Sexual Activity   • Alcohol use: Never   • Drug use: Never   • Sexual activity: None   Other Topics Concern   • None   Social History Narrative   • None     Social Determinants of Health     Financial Resource Strain: Not on file   Food Insecurity: Not on file   Transportation Needs: No Transportation Needs   • Lack of Transportation (Medical): No   • Lack of Transportation (Non-Medical): No   Physical Activity: Not on file   Stress: Not on file   Social Connections: Not on file   Intimate Partner Violence: Not on file   Housing Stability: Not on file        Current Outpatient Medications:   •  benzonatate (TESSALON PERLES) 100 mg capsule, Take 1 capsule (100 mg total) by mouth 3 (three) times a day as needed for cough, Disp: 20 capsule, Rfl: 0  •  ferrous sulfate 324 (65 Fe) mg, Take 1 tablet (324 mg total) by mouth every other day, Disp: 15 tablet, Rfl: 0  •  levothyroxine 25 mcg tablet, Take 25 mcg by mouth daily, Disp: , Rfl:   •  polyethylene glycol (MIRALAX) 17 g packet, Take 17 g by mouth daily as needed (constipation), Disp: 12 each, Rfl: 0  •  simvastatin (ZOCOR) 20 mg tablet, Take 20 mg by mouth daily, Disp: , Rfl:   History reviewed  No pertinent family history             Coordination of Care: Wound team aware of the treatment plan  Facility nurse will provide wound treatment and monitor the wound for any changes  Patient / Staff education : Patient / Staff was given education on sign of infection and pressure ulcer prevention  Patient/ Staff verbalized understanding     Follow up :  Sign off    Voice-recognition software may have been used in the preparation of this document  Occasional wrong word or "sound-alike" substitutions may have occurred due to the inherent limitations of voice recognition software  Interpretation should be guided by context        Anika Villeda

## 2022-11-01 NOTE — ASSESSMENT & PLAN NOTE
Sacrum  - healed  - ordered hydraguard for moisture management  - continue to reposition when in bed  - Sign off  Facility staff will continue to provide treatment and monitor the wound  Can reconsult wound nurse practitioner if wound failed to heal or worsened

## 2022-11-08 ENCOUNTER — NURSING HOME VISIT (OUTPATIENT)
Dept: GERIATRICS | Facility: OTHER | Age: 87
End: 2022-11-08

## 2022-11-08 DIAGNOSIS — R26.2 AMBULATORY DYSFUNCTION: Primary | ICD-10-CM
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
## 2022-11-09 ENCOUNTER — NURSING HOME VISIT (OUTPATIENT)
Dept: GERIATRICS | Facility: OTHER | Age: 87
End: 2022-11-09

## 2022-11-09 DIAGNOSIS — E78.5 HYPERLIPIDEMIA, UNSPECIFIED HYPERLIPIDEMIA TYPE: ICD-10-CM

## 2022-11-09 DIAGNOSIS — E03.9 HYPOTHYROIDISM, UNSPECIFIED TYPE: ICD-10-CM

## 2022-11-09 DIAGNOSIS — R54 FRAILTY SYNDROME IN GERIATRIC PATIENT: ICD-10-CM

## 2022-11-09 DIAGNOSIS — R26.2 AMBULATORY DYSFUNCTION: Primary | ICD-10-CM

## 2022-11-09 DIAGNOSIS — D64.9 ANEMIA, UNSPECIFIED TYPE: ICD-10-CM

## 2022-11-09 NOTE — ASSESSMENT & PLAN NOTE
• Maintain fall and safety precautions  • Encourage use of call bell and asst devices   • Consult PT/OT services prn  • Assist with transfers, mobility, and ADLs

## 2022-11-09 NOTE — ASSESSMENT & PLAN NOTE
· Patient with unwitnessed fall, found by nursing in her room sitting on her bottom  · Patient reports she saw somebody "walk by" and got up out of her recliner to see who it was and fell  · Patient reports hitting her left knee/lateral left leg  · Upon assessment left knee/lateral left leg is tender upon palpation  · Will order two-view x-ray to rule out fracture  · May apply ice q 4 hours p r n   For pain  · Continue Tylenol a 650 mg po Q 4 hours prn for pain  · Continue neuro checks and vital signs  · Continue to monitor for acute changes in condition

## 2022-11-09 NOTE — PROGRESS NOTES
[facility]  [address] [place] [address]  [phone]  [facility]  [address]  Acute Visit PROGRESS NOTE        NAME: Debbi Ortiz  AGE: 80 y o  SEX: female  :  1/10/1928  DATE OF ENCOUNTER: 2022    Chief Complaint   Patient seen and examined for an acute fall  History of Present Illness     Debbi Ortiz is a patient of [facility] is being seen and examined today for an acute fall  Nursing reports that the patient was found on the floor in her room on her buttocks at change of shift  The patient reports she saw some body walk by and got up to see who it was and fell  She reports that she fell and hit her left knee/lateral lower leg  She reports some pain upon palpation  She has full ROM  I will order an X-ray to rule out a fracture    The following portions of the patient's history were reviewed and updated as appropriate: allergies, current medications, past family history, past medical history, past social history, past surgical history and problem list     Review of Systems     A 12-point comprehensive review of symptoms was obtained with pertinent positives and negatives noted per HPI  History     Past Medical History:   Diagnosis Date   • 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   • CKD (chronic kidney disease)    • Fall at nursing home 2022   • HLD (hyperlipidemia)    • HTN (hypertension)    • Hypothyroidism      No past surgical history on file  No family history on file    Social History     Socioeconomic History   • Marital status: Single     Spouse name: Not on file   • Number of children: Not on file   • Years of education: Not on file   • Highest education level: Not on file   Occupational History   • Not on file   Tobacco Use   • Smoking status: Never Smoker   • Smokeless tobacco: Never Used   Vaping Use   • Vaping Use: Never used   Substance and Sexual Activity   • Alcohol use: Never   • Drug use: Never   • Sexual activity: Not on file   Other Topics Concern • Not on file   Social History Narrative   • Not on file     Social Determinants of Health     Financial Resource Strain: Not on file   Food Insecurity: Not on file   Transportation Needs: No Transportation Needs   • Lack of Transportation (Medical): No   • Lack of Transportation (Non-Medical): No   Physical Activity: Not on file   Stress: Not on file   Social Connections: Not on file   Intimate Partner Violence: Not on file   Housing Stability: Not on file     No Known Allergies    Objective     Vital Signs  BP:  129/80       HR:  78 T:  97 2    RR:  18 O2Sat:  98% W:  113  General: NAD, Thin,Frail  Oral: Oropharynx Dry and Clear  Neck: Supple, +ROM  CV: S1, S2, normal rate, regular rhythm, no murmur appreciated  Pulmonary: Lung sounds clear to air, no wheezing, rhonchi, rales  Abdominal:BS + x4 in all quadrants, soft, no mass, no tenderness  Extremities: No edema, +ROM, +Weakness  Skin: Warm, Dry, no lesions, no rash, no erythema present, no ecchymosis present  Neurological/Psych: Alert and oriented times 2, forgetful/normal mood, no affect, poor judgement    Pertinent Laboratory/Diagnostic Studies have been independently reviewed  Current Medications     Current Medications Reviewed and updated in Nursing Home EMR  Assessment and Plan     Fall at nursing home  · Patient with unwitnessed fall, found by nursing in her room sitting on her bottom  · Patient reports she saw somebody "walk by" and got up out of her recliner to see who it was and fell  · Patient reports hitting her left knee/lateral left leg  · Upon assessment left knee/lateral left leg is tender upon palpation  · Will order two-view x-ray to rule out fracture  · May apply ice q 4 hours p r n   For pain  · Continue Tylenol a 650 mg po Q 4 hours prn for pain  · Continue neuro checks and vital signs  · Continue to monitor for acute changes in condition    Ambulatory dysfunction  • Maintain fall and safety precautions  • Encourage use of call bell and asst devices   • Consult PT/OT services prn  • Assist with transfers, mobility, and ADLs        [facility]  Geriatric Medicine  11/8/2022

## 2022-11-13 PROBLEM — R54 FRAILTY SYNDROME IN GERIATRIC PATIENT: Status: ACTIVE | Noted: 2022-11-13

## 2022-11-13 NOTE — ASSESSMENT & PLAN NOTE
· Continue Simvastatin 20 mg po daily  · Total 137, LDL 79 last drawn 9/28/22  · Will monitor future lipid panel for statin adjustment

## 2022-11-13 NOTE — ASSESSMENT & PLAN NOTE
· 10/26 Last Hgb 8 8 /Hct 26 5, 9/25 Iron Sat 8, TIBC 191, Iron 16  · Will continue periodic CBC as this appears baseline for patient  · Encourage PO fluid intake  · Continue Ferrous Sulfate 324 mcg po daily  · Encourage Iron rich foods  · Continue to monitor patient for acute changes in condition

## 2022-11-13 NOTE — PROGRESS NOTES
[facility]  [address] [place] [id number]  [phone]  [facility]  [address]  PROGRESS NOTE        NAME: Susan Rousseau  AGE: 80 y o  SEX: female  :  1/10/1928  DATE OF ENCOUNTER: 2022    Chief Complaint   Patient seen and examined for follow up on chronic conditions  History of Present Illness     Susan Rousseau is a patient of [facility] with acute and chronic medical conditions of hypothyroidism, hypertension, hyperlipidemia, CKD, hx of UTI, anemia, lower extremity edema, and ambulatory dysfunction  The patient is being seen and examined today for follow-up on acute and chronic medical conditions  Upon examination, the patient is sitting in her recliner, alert, cooperative, and in no acute distress  She denies pain, sob, chest pain, abdominal pain, fever, chills, nausea/vomiting, diarrhea/constipation, or dysuria  The patient reports she has a fair appetite and is drinking an adequate amount of fluids  The patient sustained a fall yesterday without injury  X-ray was done of her lower left leg and knee to r/o fracture r/t pain which were negative  The patient denies any further pain today  The following portions of the patient's history were reviewed and updated as appropriate: allergies, current medications, past family history, past medical history, past social history, past surgical history and problem list     Review of Systems     A 12-point comprehensive review of symptoms was obtained with pertinent positives and negatives noted per HPI  History     Past Medical History:   Diagnosis Date   • 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   • CKD (chronic kidney disease)    • Fall at nursing home 2022   • Frailty syndrome in geriatric patient 2022   • HLD (hyperlipidemia)    • HTN (hypertension)    • Hypothyroidism      No past surgical history on file  No family history on file    Social History     Socioeconomic History   • Marital status: Single     Spouse name: Not on file   • Number of children: Not on file   • Years of education: Not on file   • Highest education level: Not on file   Occupational History   • Not on file   Tobacco Use   • Smoking status: Never Smoker   • Smokeless tobacco: Never Used   Vaping Use   • Vaping Use: Never used   Substance and Sexual Activity   • Alcohol use: Never   • Drug use: Never   • Sexual activity: Not on file   Other Topics Concern   • Not on file   Social History Narrative   • Not on file     Social Determinants of Health     Financial Resource Strain: Not on file   Food Insecurity: Not on file   Transportation Needs: No Transportation Needs   • Lack of Transportation (Medical): No   • Lack of Transportation (Non-Medical): No   Physical Activity: Not on file   Stress: Not on file   Social Connections: Not on file   Intimate Partner Violence: Not on file   Housing Stability: Not on file     No Known Allergies    Objective     Vital Signs  BP:  107/55       HR:  70 T:  97 4    RR:  20 O2Sat:  96% W:  119  General: NAD, Thin,Frail  Oral: Oropharynx Dry and Clear  Neck: Supple, +ROM  CV: S1, S2, normal rate, regular rhythm, no murmur appreciated  Pulmonary: Lung sounds clear to air, no wheezing, rhonchi, rales  Abdominal:BS + x4 in all quadrants, soft, no mass, no tenderness  Extremities: +1 LLE edema, +ROM, +Weakness  Skin: Warm, Dry, no lesions, no rash, no erythema present, no ecchymosis present  Neurological/Psych: Alert and oriented times 2, forgetful/normal mood, no affect, poor judgement    Pertinent Laboratory/Diagnostic Studies have been independently reviewed  Current Medications     Current Medications Reviewed and updated in Nursing Home EMR      Assessment and Plan     Ambulatory dysfunction  · Patient with history of falls  • Maintain fall and safety precautions  • Encourage use of call bell and use of asst device  • Consult PT/OT services prn  • Assist with transfers, mobility, and ADLs  • Continue to monitor patient for acute changes in condition      Anemia  · 10/26 Last Hgb 8 8 /Hct 26 5, 9/25 Iron Sat 8, TIBC 191, Iron 16  · Will continue periodic CBC as this appears baseline for patient  · Encourage PO fluid intake  · Continue Ferrous Sulfate 324 mcg po daily  · Encourage Iron rich foods  · Continue to monitor patient for acute changes in condition      HLD (hyperlipidemia)  · Continue Simvastatin 20 mg po daily  · Total 137, LDL 79 last drawn 9/28/22  · Will monitor future lipid panel for statin adjustment    Hypothyroidism  · Continue Levothyroxine 25 mcg po daily   · Last TSH level drawn was 9/24/22 and wnl      Frailty syndrome in geriatric patient  • Patient with multiple Comorbidities  • Patient continues to require 24/7 long-term care in a facility in which supportive services and ADLs can be provided  • Maintain fall and safety precautions  • Continue to monitor patient for decline        [facility] [place] [address] Medicine  11/9/2022

## 2022-11-13 NOTE — ASSESSMENT & PLAN NOTE
• Patient with multiple Comorbidities  • Patient continues to require 24/7 long-term care in a facility in which supportive services and ADLs can be provided  • Maintain fall and safety precautions  • Continue to monitor patient for decline

## 2022-11-13 NOTE — ASSESSMENT & PLAN NOTE
· Patient with history of falls  • Maintain fall and safety precautions  • Encourage use of call bell and use of asst device  • Consult PT/OT services prn  • Assist with transfers, mobility, and ADLs  • Continue to monitor patient for acute changes in condition

## 2022-11-26 PROBLEM — N39.0 UTI (URINARY TRACT INFECTION): Status: RESOLVED | Noted: 2022-09-25 | Resolved: 2022-11-26

## 2022-12-16 ENCOUNTER — NURSING HOME VISIT (OUTPATIENT)
Dept: GERIATRICS | Facility: OTHER | Age: 87
End: 2022-12-16

## 2022-12-16 DIAGNOSIS — E03.9 HYPOTHYROIDISM, UNSPECIFIED TYPE: Primary | ICD-10-CM

## 2022-12-16 DIAGNOSIS — I10 HYPERTENSION, UNSPECIFIED TYPE: ICD-10-CM

## 2022-12-16 DIAGNOSIS — D50.9 IRON DEFICIENCY ANEMIA, UNSPECIFIED IRON DEFICIENCY ANEMIA TYPE: ICD-10-CM

## 2022-12-16 DIAGNOSIS — R54 FRAILTY SYNDROME IN GERIATRIC PATIENT: ICD-10-CM

## 2022-12-16 DIAGNOSIS — Z66 DNR (DO NOT RESUSCITATE): ICD-10-CM

## 2022-12-16 DIAGNOSIS — N18.31 STAGE 3A CHRONIC KIDNEY DISEASE (HCC): ICD-10-CM

## 2022-12-16 DIAGNOSIS — E78.5 HYPERLIPIDEMIA, UNSPECIFIED HYPERLIPIDEMIA TYPE: ICD-10-CM

## 2022-12-17 PROBLEM — Z86.16 HISTORY OF 2019 NOVEL CORONAVIRUS DISEASE (COVID-19): Status: ACTIVE | Noted: 2022-09-28

## 2022-12-17 PROBLEM — G93.40 ACUTE ENCEPHALOPATHY: Status: RESOLVED | Noted: 2022-09-24 | Resolved: 2022-12-17

## 2022-12-17 PROBLEM — Z66 DNR (DO NOT RESUSCITATE): Status: ACTIVE | Noted: 2022-12-17

## 2022-12-17 PROBLEM — D50.9 IRON DEFICIENCY ANEMIA: Status: ACTIVE | Noted: 2022-09-24

## 2022-12-17 PROBLEM — I36.1 NONRHEUMATIC TRICUSPID VALVE REGURGITATION: Status: ACTIVE | Noted: 2022-12-17

## 2022-12-17 PROBLEM — I34.0 MILD MITRAL REGURGITATION BY PRIOR ECHOCARDIOGRAM: Status: ACTIVE | Noted: 2022-12-17

## 2022-12-17 PROBLEM — I07.1 TRICUSPID REGURGITATION: Status: ACTIVE | Noted: 2022-12-17

## 2022-12-17 RX ORDER — ASPIRIN 81 MG
1 TABLET, DELAYED RELEASE (ENTERIC COATED) ORAL DAILY
COMMUNITY

## 2022-12-17 NOTE — ASSESSMENT & PLAN NOTE
· December 8, 2022: Creatinine 0 91, EGFR 58  · Will continue to avoid nephrotoxic medications and supplements  · Will continue with clinical and periodic laboratory monitoring for change in her condition

## 2022-12-17 NOTE — ASSESSMENT & PLAN NOTE
· September 20, 2022: Fasting lipid profile:  Total cholesterol 137, triglycerides 83, HDL 41, LDL 79  · December 8, 2022: LFTs within normal limits except albumin 2 4 and total protein 6  · Will continue with her prior to admission simvastatin  · Will continue with periodic laboratory monitoring for change in her condition

## 2022-12-17 NOTE — ASSESSMENT & PLAN NOTE
· December 8, 2022: Hemoglobin/hematocrit: 9 6/30 4  · Her hemoglobin is improving with oral iron therapy  · Will continue with monitoring for change in her condition

## 2022-12-17 NOTE — ASSESSMENT & PLAN NOTE
· Secondary to her chronic medical conditions  · She continues to require 24/7 care/support of her ADLs  · She is level 7 on the clinical frailty scale consistent with being severely frail  · I recommend continued care/support of her ADLs as a long-term resident at the nursing facility  · Will continue to monitor for change in her condition

## 2022-12-17 NOTE — ASSESSMENT & PLAN NOTE
· Review of her BP log shows that she is doing well without medication  · Will continue with monitoring for change in her condition

## 2022-12-17 NOTE — PROGRESS NOTES
[facility] [facility]  [address] [place] [address], [address] [place] [address]  Facility:  [facility] [place] [facility] [address]    NAME: Alivia Cardenas  AGE: 80 y o  SEX: female    DATE OF ENCOUNTER: 12/16/2022    Code status:  DNR    Assessment and Plan     1  Hypothyroidism, unspecified type  Assessment & Plan:  · September 24, 2022: TSH 1 685  · She is doing well with her current levothyroxine of 25 mcg daily  · Will continue with clinical and periodic laboratory monitoring for change in her condition      2  Frailty syndrome in geriatric patient  Assessment & Plan:  · Secondary to her chronic medical conditions  · She continues to require 24/7 care/support of her ADLs  · She is level 7 on the clinical frailty scale consistent with being severely frail  · I recommend continued care/support of her ADLs as a long-term resident at the nursing facility  · Will continue to monitor for change in her condition      3  Stage 3a chronic kidney disease [facility])  Assessment & Plan:  · December 8, 2022: Creatinine 0 91, EGFR 58  · Will continue to avoid nephrotoxic medications and supplements  · Will continue with clinical and periodic laboratory monitoring for change in her condition      4  Hypertension, unspecified type  Assessment & Plan:  · Review of her BP log shows that she is doing well without medication  · Will continue with monitoring for change in her condition      5  Hyperlipidemia, unspecified hyperlipidemia type  Assessment & Plan:  · September 20, 2022: Fasting lipid profile: Total cholesterol 137, triglycerides 83, HDL 41, LDL 79  · December 8, 2022: LFTs within normal limits except albumin 2 4 and total protein 6  · Will continue with her prior to admission simvastatin  · Will continue with periodic laboratory monitoring for change in her condition      6   Iron deficiency anemia, unspecified iron deficiency anemia type  Assessment & Plan:  · December 8, 2022: Hemoglobin/hematocrit: 9 6/30 4  · Her hemoglobin is improving with oral iron therapy  · Will continue with monitoring for change in her condition      7  DNR (do not resuscitate)    See hospital discharge summary note from September 2022 for further information  All medications and routine orders were reviewed and updated as needed  Plan discussed with: Nursing staff  Chief Complaint     She is seen for a follow-up visit to update the care and treatment of her hypothyroidism, frailty secondary to her chronic medical conditions, stage IIIa chronic kidney disease, and hypertension  History of Present Illness     She is a 30-year-old woman who is seen for a follow-up visit to update the care and treatment of her hypothyroidism-she is doing well with levothyroxine, frailty secondary to her chronic medical conditions-she continues to require 24/7 care/support of her ADLs, stage IIIa chronic kidney disease-doing well with avoidance of nephrotoxic medications and supplements, and hypertension-doing well without medication  Nursing staff reports that her overall condition is stable, that she has a fair appetite (loves candy), that she is sleeping well, and that she is having no difficulty with constipation  She requires assistance with her ADL care  Nursing reports that she is not exhibiting any behaviors that are distressing to her or disruptive to the nursing unit  When asked, she denies chest pain and shortness of breath  She reports feeling well  The following portions of the patient's history were reviewed and updated as appropriate: current medications, past family history, past medical history, past social history, past surgical history and problem list     Allergies:  No Known Allergies    Review of Systems     Review of Systems   Respiratory: Negative for shortness of breath  Cardiovascular: Negative for chest pain  Medications and orders     All medications reviewed and updated in Nursing Home EMR        Objective     Vitals: Months vitals: Weight 117 4 pounds, pulse 98, blood pressure 113/56  Review of her BP, AP, and weight logs looks well  Physical Exam  Vitals reviewed  Constitutional:       General: She is awake  She is not in acute distress  Appearance: She is well-developed  She is not toxic-appearing or diaphoretic  Comments: She is pleasant and appears comfortable lying in bed, her stated age, and frail  Cardiovascular:      Rate and Rhythm: Normal rate and regular rhythm  Heart sounds: Murmur (3/6 holosystolic murmur heard across the precordium) heard  No friction rub  No gallop  Comments: There is no edema in her lower extremities  Pulmonary:      Effort: No respiratory distress  Breath sounds: Normal breath sounds  No stridor  No wheezing, rhonchi or rales  Neurological:      Mental Status: She is alert  Psychiatric:         Mood and Affect: Mood normal          Behavior: Behavior normal  Behavior is cooperative  Pertinent Laboratory/Diagnostic Studies: The following labs were reviewed please see chart or hospital paperwork for details  September 24, 2022:    TSH 1 685    September 25, 2022:    TIBC profile: Iron 16, saturation 8%, TIBC 191    September 28, 2022:     Fasting lipid profile: Total cholesterol 137, triglycerides 83, HDL 41, LDL 79    December 28, 2022:     CBC with differential: WBC count 10 1, hemoglobin 9 6, hematocrit 30 4, platelet count 686,720, MCV 73    CMP: Sodium 140, potassium 4 2, BUN 18, creatinine 0 91, fasting blood sugar 111, EGFR 58, LFTs within normal limits except albumin 2 4 and total protein 6    - Her order summary was reviewed and signed  Portions of the record may have been created with voice recognition software  Occasional wrong word or "sound a like" substitutions may have occurred due to the inherent limitations of voice recognition software    Read the chart carefully and recognize, using context, where substitutions have occurred      GEORGE Arellano   12/17/2022 11:12 AM

## 2022-12-17 NOTE — ASSESSMENT & PLAN NOTE
· September 24, 2022: TSH 1 685  · She is doing well with her current levothyroxine of 25 mcg daily  · Will continue with clinical and periodic laboratory monitoring for change in her condition

## 2023-01-18 ENCOUNTER — NURSING HOME VISIT (OUTPATIENT)
Dept: OTHER | Facility: HOSPITAL | Age: 88
End: 2023-01-18

## 2023-01-18 DIAGNOSIS — I10 HYPERTENSION, UNSPECIFIED TYPE: ICD-10-CM

## 2023-01-18 DIAGNOSIS — N18.31 STAGE 3A CHRONIC KIDNEY DISEASE (HCC): ICD-10-CM

## 2023-01-18 DIAGNOSIS — R54 FRAILTY SYNDROME IN GERIATRIC PATIENT: Primary | ICD-10-CM

## 2023-01-18 DIAGNOSIS — E78.5 HYPERLIPIDEMIA, UNSPECIFIED HYPERLIPIDEMIA TYPE: ICD-10-CM

## 2023-01-18 DIAGNOSIS — D50.9 IRON DEFICIENCY ANEMIA, UNSPECIFIED IRON DEFICIENCY ANEMIA TYPE: ICD-10-CM

## 2023-01-18 DIAGNOSIS — E03.9 HYPOTHYROIDISM, UNSPECIFIED TYPE: ICD-10-CM

## 2023-01-18 NOTE — PROGRESS NOTES
[address]  [address]  Facility:  [facility]    NAME: Edward Gallegos  AGE: 80 y o  SEX: female    DATE OF ENCOUNTER: 1/18/2023    Code status:  No CPR    Assessment and Plan     1  Frailty syndrome in geriatric patient  Assessment & Plan:  - Level 7 on frailty score  - 2/2 to her chronic ongoing medical conditions  - Require continued support and LTC      2  Hypertension, unspecified type  Assessment & Plan:  - BP currently stable  - No concerns at this time      3  Hypothyroidism, unspecified type  Assessment & Plan:  - Last TSH of 1 685 in September  - Currently on levothyroxine 25 mcg and tolerating well  - Continue to monitor thyroid function every 6-12 months       4  Stage 3a chronic kidney disease [facility])  Assessment & Plan:  Lab Results   Component Value Date    EGFR 40 09/29/2022    EGFR 39 09/28/2022    EGFR 45 09/27/2022    CREATININE 1 15 09/29/2022    CREATININE 1 19 09/28/2022    CREATININE 1 06 09/27/2022     - Maintain MAP >65  - Avoid nephrotoxic drugs   - Ensure adequate hydration       5  Iron deficiency anemia, unspecified iron deficiency anemia type  Assessment & Plan:  - 9/29/22 Hgb/Hct of 9 8/32 5  - Hgb improving with oral iron  - Continue to monitor for any signs of constipation, nausea and/or diarrhea  - Monitor for any signs of GI bleeding      6  Hyperlipidemia, unspecified hyperlipidemia type  Assessment & Plan:  - 9/28/22 Total cholesterol 137, triglycerides 83, HDL 41, LDL 79  - Lipids currently well controlled  - Continue simvastatin        All medications and routine orders were reviewed and updated as needed  Plan discussed with: Dr Sommer Last    Chief Complaint     Follow-up of chronic conditions    History of Present Illness     Edward Gallegos is a 80 yoF PMH of hypothyroidism, HTN, CKD 3, HLD, iron deficiency anemia  Patient was seen today to get an update regarding her overall health and well being       Per Nursing staff patient is without any new complaints or concerns and is stable overall  She continues to require 24/7 care/support of her ADL's  She is without any change in appetite, sleeping pattern, bowel/bladder habits or altered behavior  Alem Strong was seen an examined in the hallway while sitting in her wheelchair  She did not wish to return to her room for privacy  She was without any complaints or concerns when asked  She reports feeling well overall  The following portions of the patient's history were reviewed and updated as appropriate: current medications, past family history, past medical history, past social history, past surgical history and problem list     Allergies:  No Known Allergies    Review of Systems     Review of Systems   Constitutional: Negative for activity change, fatigue and unexpected weight change  HENT: Negative for congestion and rhinorrhea  Eyes: Negative for visual disturbance  Respiratory: Negative for chest tightness and shortness of breath  Cardiovascular: Negative for chest pain and palpitations  Gastrointestinal: Negative for constipation and diarrhea  Endocrine: Negative for polydipsia and polyuria  Genitourinary: Negative for dysuria and hematuria  Musculoskeletal: Negative for arthralgias and myalgias  Skin: Negative for color change  Neurological: Negative for dizziness, light-headedness and headaches  Medications and orders     All medications reviewed and updated in Nursing Home EMR  Objective     Vitals: Wt 114 7lbs Temp 97 6  /61  HR 77  RR 16  O2 96%      Physical Exam  Constitutional:       General: She is not in acute distress  Appearance: She is not ill-appearing  HENT:      Head: Normocephalic and atraumatic  Right Ear: External ear normal       Left Ear: External ear normal       Nose: Nose normal    Eyes:      Extraocular Movements: Extraocular movements intact        Conjunctiva/sclera: Conjunctivae normal    Cardiovascular:      Rate and Rhythm: Normal rate and regular rhythm  Heart sounds: Murmur heard  Pulmonary:      Effort: Pulmonary effort is normal       Breath sounds: Normal breath sounds  Abdominal:      General: Abdomen is flat  Palpations: Abdomen is soft  Musculoskeletal:      Right lower leg: No edema  Left lower leg: No edema  Skin:     General: Skin is warm and dry  Neurological:      Mental Status: She is alert and oriented to person, place, and time  Portions of the record may have been created with voice recognition software  Occasional wrong word or "sound a like" substitutions may have occurred due to the inherent limitations of voice recognition software  Read the chart carefully and recognize, using context, where substitutions have occurred      Dale Welch DO  1/18/2023 8:25 PM

## 2023-01-19 NOTE — ASSESSMENT & PLAN NOTE
- Level 7 on frailty score  - 2/2 to her chronic ongoing medical conditions  - Require continued support and LTC

## 2023-01-19 NOTE — ASSESSMENT & PLAN NOTE
Lab Results   Component Value Date    EGFR 40 09/29/2022    EGFR 39 09/28/2022    EGFR 45 09/27/2022    CREATININE 1 15 09/29/2022    CREATININE 1 19 09/28/2022    CREATININE 1 06 09/27/2022     - Maintain MAP >65  - Avoid nephrotoxic drugs   - Ensure adequate hydration

## 2023-01-19 NOTE — ASSESSMENT & PLAN NOTE
- 9/28/22 Total cholesterol 137, triglycerides 83, HDL 41, LDL 79  - Lipids currently well controlled  - Continue simvastatin

## 2023-01-19 NOTE — ASSESSMENT & PLAN NOTE
- Last TSH of 1 685 in September  - Currently on levothyroxine 25 mcg and tolerating well  - Continue to monitor thyroid function every 6-12 months

## 2023-01-19 NOTE — ASSESSMENT & PLAN NOTE
- 9/29/22 Hgb/Hct of 9 8/32 5  - Hgb improving with oral iron  - Continue to monitor for any signs of constipation, nausea and/or diarrhea  - Monitor for any signs of GI bleeding

## 2023-02-22 ENCOUNTER — NURSING HOME VISIT (OUTPATIENT)
Dept: GERIATRICS | Facility: OTHER | Age: 88
End: 2023-02-22

## 2023-02-22 DIAGNOSIS — R41.89 IMPAIRED COGNITION: ICD-10-CM

## 2023-02-22 DIAGNOSIS — R26.2 AMBULATORY DYSFUNCTION: ICD-10-CM

## 2023-02-22 DIAGNOSIS — M54.89 MIDLINE BACK PAIN, UNSPECIFIED BACK LOCATION, UNSPECIFIED CHRONICITY: Primary | ICD-10-CM

## 2023-02-22 DIAGNOSIS — R54 FRAILTY SYNDROME IN GERIATRIC PATIENT: ICD-10-CM

## 2023-02-25 PROBLEM — M54.89 MIDLINE BACK PAIN: Status: ACTIVE | Noted: 2023-02-25

## 2023-02-27 PROBLEM — R41.89 IMPAIRED COGNITION: Status: ACTIVE | Noted: 2023-02-27

## 2023-03-01 ENCOUNTER — NURSING HOME VISIT (OUTPATIENT)
Dept: GERIATRICS | Facility: OTHER | Age: 88
End: 2023-03-01

## 2023-03-01 DIAGNOSIS — M54.89 MIDLINE BACK PAIN, UNSPECIFIED BACK LOCATION, UNSPECIFIED CHRONICITY: Primary | ICD-10-CM

## 2023-03-01 DIAGNOSIS — R41.89 IMPAIRED COGNITION: ICD-10-CM

## 2023-03-01 DIAGNOSIS — R26.2 AMBULATORY DYSFUNCTION: ICD-10-CM

## 2023-03-02 NOTE — PROGRESS NOTES
[address]  [address] [place]  [phone]  [facility]  [address]  PROGRESS NOTE        NAME: Zulma Ji  AGE: 80 y o  SEX: female  :  1/10/1928  DATE OF ENCOUNTER: 3/1/2023    Chief Complaint   Patient seen and examined for follow up on chronic conditions  History of Present Illness     Zulma Ji is a patient of [facility] with acute and chronic medical conditions of hypothyroidism, hypertension, hyperlipidemia, CKD, hx of UTI, anemia, chronic lower back pain, lower extremity edema, and ambulatory dysfunction         The patient is being seen and examined today for follow-up on acute and chronic medical conditions  Upon examination, the patient is sitting in her wheelchair, alert, cooperative, and in no acute distress  She denies  sob, chest pain, abdominal pain, fever, chills, nausea/vomiting, diarrhea/constipation, or dysuria  The patient reports he/she has a good appetite and is drinking an adequate amount of fluids  The patient reports she is currently having back pain while sitting in her wheelchair today  PT/OT have been providing therapy treatment for restorative services  The following portions of the patient's history were reviewed and updated as appropriate: allergies, current medications, past family history, past medical history, past social history, past surgical history and problem list     Review of Systems     A 12-point comprehensive review of symptoms was obtained with pertinent positives and negatives noted per HPI  History     Past Medical History:   Diagnosis Date   • Acute encephalopathy 2022   • 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   • CKD (chronic kidney disease)    • Fall at nursing home 2022   • Frailty syndrome in geriatric patient 2022   • HLD (hyperlipidemia)    • HTN (hypertension)    • Hypothyroidism    • Impaired cognition 2023   • Midline back pain 2023     No past surgical history on file    No family history on file  Social History     Socioeconomic History   • Marital status: Single     Spouse name: Not on file   • Number of children: Not on file   • Years of education: Not on file   • Highest education level: Not on file   Occupational History   • Not on file   Tobacco Use   • Smoking status: Never   • Smokeless tobacco: Never   Vaping Use   • Vaping Use: Never used   Substance and Sexual Activity   • Alcohol use: Never   • Drug use: Never   • Sexual activity: Not on file   Other Topics Concern   • Not on file   Social History Narrative   • Not on file     Social Determinants of Health     Financial Resource Strain: Not on file   Food Insecurity: Not on file   Transportation Needs: No Transportation Needs   • Lack of Transportation (Medical): No   • Lack of Transportation (Non-Medical): No   Physical Activity: Not on file   Stress: Not on file   Social Connections: Not on file   Intimate Partner Violence: Not on file   Housing Stability: Not on file     No Known Allergies    Objective     Vital Signs  BP: 137/61      HR:77 T:97    RR:16 O2Sat:96% W:116 2  Physical Exam  Vitals reviewed  Constitutional:       Appearance: She is underweight  HENT:      Head: Normocephalic  Right Ear: External ear normal       Left Ear: External ear normal       Mouth/Throat:      Mouth: Mucous membranes are moist       Pharynx: Oropharynx is clear  Eyes:      Conjunctiva/sclera: Conjunctivae normal    Cardiovascular:      Pulses: Normal pulses  Heart sounds: Normal heart sounds  Pulmonary:      Effort: Pulmonary effort is normal  No respiratory distress  Breath sounds: Normal breath sounds  No stridor  No wheezing, rhonchi or rales  Abdominal:      General: Bowel sounds are normal  There is no distension  Palpations: Abdomen is soft  Skin:     General: Skin is warm and dry  Neurological:      Mental Status: She is alert  Mental status is at baseline  She is disoriented     Psychiatric: Attention and Perception: Attention normal          Mood and Affect: Mood and affect normal          Speech: Speech normal          Behavior: Behavior normal  Behavior is cooperative  Cognition and Memory: Cognition is impaired  Memory is impaired  Pertinent Laboratory/Diagnostic Studies have been independently reviewed  Current Medications     Current Medications Reviewed and updated in Nursing Home EMR  Assessment and Plan     No problem-specific Assessment & Plan notes found for this encounter        [address] [facility]  Geriatric Medicine  3/1/2023

## 2023-03-04 NOTE — ASSESSMENT & PLAN NOTE
· Continue to reorient, redirect, and reassure patient  · Encourage engagement and activity  · Maintain delirium precautions and sleep/wake cycle  · Patient continues to require 24/7 assistance in which supportive services and ADLs can be provided  · Continue to monitor for acute changes in condition

## 2023-03-04 NOTE — ASSESSMENT & PLAN NOTE
• Patient with history of falls  • Patient requires frequent reminders r/t poor safety judgement and cognition  • Maintain fall and safety precautions  • Encourage use of call bell and asst devices  • Continue PT/OT services  • Assist with transfers, mobility, and ADLs

## 2023-03-04 NOTE — ASSESSMENT & PLAN NOTE
· Continue Tylenol and Lidocaine patch on am/off pm  · Consult placed for physiatry, Dr Daysi Johnson to eval patient  · Continue to monitor patient for acute changes in condition

## 2023-03-07 ENCOUNTER — NURSING HOME VISIT (OUTPATIENT)
Dept: GERIATRICS | Facility: OTHER | Age: 88
End: 2023-03-07

## 2023-03-07 DIAGNOSIS — I10 HYPERTENSION, UNSPECIFIED TYPE: ICD-10-CM

## 2023-03-07 DIAGNOSIS — E03.9 HYPOTHYROIDISM, UNSPECIFIED TYPE: ICD-10-CM

## 2023-03-07 DIAGNOSIS — E78.5 HYPERLIPIDEMIA, UNSPECIFIED HYPERLIPIDEMIA TYPE: Primary | ICD-10-CM

## 2023-03-07 DIAGNOSIS — N18.31 STAGE 3A CHRONIC KIDNEY DISEASE (HCC): ICD-10-CM

## 2023-03-07 DIAGNOSIS — R54 FRAILTY SYNDROME IN GERIATRIC PATIENT: ICD-10-CM

## 2023-03-07 DIAGNOSIS — R41.89 IMPAIRED COGNITION: ICD-10-CM

## 2023-03-07 DIAGNOSIS — Z66 DNR (DO NOT RESUSCITATE): ICD-10-CM

## 2023-03-07 DIAGNOSIS — D50.9 IRON DEFICIENCY ANEMIA, UNSPECIFIED IRON DEFICIENCY ANEMIA TYPE: ICD-10-CM

## 2023-03-07 DIAGNOSIS — I07.1 TRICUSPID VALVE INSUFFICIENCY, UNSPECIFIED ETIOLOGY: ICD-10-CM

## 2023-03-07 NOTE — PROGRESS NOTES
[address]  [address] [place] [address]  Facility:  [facility]    NAME: Analy Figueroa  AGE: 80 y o  SEX: female    DATE OF ENCOUNTER: 3/7/2023    Code status:  DNR    Assessment and Plan     1  Hyperlipidemia, unspecified hyperlipidemia type  Assessment & Plan:  · September 2022: total cholesterol 137, TG 83, HDL 41, LDL 79  · Lipid panel well controlled  · Doing well on Simvastatin 20 mg daily  · Continue current medication treatment      2  Hypothyroidism, unspecified type  Assessment & Plan:  · September 2022: TSH of 1 685  · Well controlled on Levothyroxine 25 mcg daily  · Continue current medication treatment  · Repeat TSH      3  Stage 3a chronic kidney disease ([facility] [address] )  Assessment & Plan:  · Well controlled and stable  · December 2022: GFR 58, creatinine 0 91  · Avoid nephrotoxic agents  · Maintain good blood pressure control  · Encourage oral hydration      4  Hypertension, unspecified type  Assessment & Plan:  · BP at goal and well controlled  · Will continue to monitor BP off medications      5  Iron deficiency anemia, unspecified iron deficiency anemia type  Assessment & Plan:  · December 2022:   - CBC: Hb 10 1, Ht 32 6, MCV 73  - Iron panel: iron 32, iron sat 18, transferrin 146, TIBC 178  · Doing well on oral iron supplementation every other day  · No signs of abnormal bleeding  · Will continue to monitor signs and symptoms  · Repeat new CBC and iron panel to evaluate response to treatment          6  Frailty syndrome in geriatric patient  Assessment & Plan:  · Secondary to underlying chronic medical conditions  · She continues to require 24/7 care/support of her ADLs  · She is level 7 on the clinical frailty scale consistent with being severely frail  · I recommend continues care/support of her ADLs as long-term resident at the nursing facility  · Will continue to monitor signs and symptoms from change in her condition        7   DNR (do not resuscitate)  Assessment & Plan:  · See hospital discharge summary note from September 2022 for further investigation          8  Impaired cognition  Assessment & Plan:  · Reorient, redirect and reassure patient  · Encourage engagement and activity  · Delirium precautions and sleep/wake cycle  · Patient continues to require 24/7 assistance for ADLs  · Continue to monitor signs and symptoms for acute changes in patient's condition          9  Tricuspid valve insufficiency, unspecified etiology  Assessment & Plan:  · As evidenced on ECHO done in September 2022, mild to moderate  · Patient denies chest pain or SOB  · Will continue to monitor for acute changes        All medications and routine orders were reviewed and updated as needed  Plan discussed with: Nurse    Chief Complaint     Follow-up of chronic conditions    History of Present Illness     Basim Portillo is a 70-year-old female patient with past medical history of hypothyroidism, hypertension, hyperlipidemia, CKD, anemia, chronic lower back pain, ambulatory dysfunction, frailty secondary to chronic conditions  Nursing staff reports that patient is overall stable, maintaining fair appetite, sleeping well, and that bowel movements are regular  She requires assistance with her ADL care  Patient is not exhibiting any behaviors that are distressing to her or disruptive to the nursing unit  Patient denies any chest pain, SOB, or abdominal discomfort  The following portions of the patient's history were reviewed and updated as appropriate: current medications, past family history, past medical history, past social history, past surgical history and problem list     Allergies:  No Known Allergies    Review of Systems     Review of Systems   Constitutional: Negative  Respiratory: Negative  Negative for shortness of breath  Cardiovascular: Negative  Negative for chest pain  Gastrointestinal: Negative  Negative for abdominal pain  Medications and orders     All medications reviewed and updated in Nursing Home EMR  Objective     Vitals: Weight 116 2 lbs, /61, Temp 97 3 F, HR 77, RR 16, sat O2 96%  Physical Exam  Constitutional:       General: She is not in acute distress  Appearance: Normal appearance  She is normal weight  She is not ill-appearing, toxic-appearing or diaphoretic  HENT:      Head: Normocephalic and atraumatic  Nose: Nose normal  No congestion  Mouth/Throat:      Mouth: Mucous membranes are moist    Eyes:      Conjunctiva/sclera: Conjunctivae normal    Cardiovascular:      Rate and Rhythm: Normal rate and regular rhythm  Heart sounds: Murmur heard  Pulmonary:      Effort: Pulmonary effort is normal  No respiratory distress  Breath sounds: Normal breath sounds  Musculoskeletal:      Right lower leg: No edema  Left lower leg: No edema  Skin:     General: Skin is warm  Neurological:      General: No focal deficit present  Mental Status: She is alert  Comments: Oriented to person only  Psychiatric:         Mood and Affect: Mood normal          Behavior: Behavior normal          Pertinent Laboratory/Diagnostic Studies: The following labs were reviewed please see chart or hospital paperwork for details  CBC:   Hb 10 1, Ht 32 6, MCV 73    Iron panel:  Iron 32  Iron saturation 18  Transferrin 146  TIBC 178    - Her order summary was reviewed and signed      Portions of the record may have been created with voice recognition software  Occasional wrong word or "sound a like" substitutions may have occurred due to the inherent limitations of voice recognition software  Read the chart carefully and recognize, using context, where substitutions have occurred      GEORGE Boyd   3/7/2023 2:42 PM

## 2023-03-07 NOTE — ASSESSMENT & PLAN NOTE
· September 2022: TSH of 1 685  · Well controlled on Levothyroxine 25 mcg daily  · Continue current medication treatment  · Repeat TSH

## 2023-03-07 NOTE — ASSESSMENT & PLAN NOTE
· Continue Tylenol PRN and lidocaine patch daily  · Consult with physiatry pending  · Continue PT/OT  · Will continue to monitor signs and symptoms for acute changes

## 2023-03-07 NOTE — ASSESSMENT & PLAN NOTE
· Reorient, redirect and reassure patient  · Encourage engagement and activity  · Delirium precautions and sleep/wake cycle  · Patient continues to require 24/7 assistance for ADLs  · Continue to monitor signs and symptoms for acute changes in patient's condition

## 2023-03-07 NOTE — ASSESSMENT & PLAN NOTE
· Well controlled and stable  · December 2022: GFR 58, creatinine 0 91  · Avoid nephrotoxic agents  · Maintain good blood pressure control  · Encourage oral hydration

## 2023-03-07 NOTE — ASSESSMENT & PLAN NOTE
· December 2022:   - CBC: Hb 10 1, Ht 32 6, MCV 73  - Iron panel: iron 32, iron sat 18, transferrin 146, TIBC 178  · Doing well on oral iron supplementation every other day  · No signs of abnormal bleeding  · Will continue to monitor signs and symptoms  · Repeat new CBC and iron panel to evaluate response to treatment

## 2023-03-07 NOTE — ASSESSMENT & PLAN NOTE
· Secondary to underlying chronic medical conditions  · She continues to require 24/7 care/support of her ADLs  · She is level 7 on the clinical frailty scale consistent with being severely frail  · I recommend continues care/support of her ADLs as long-term resident at the nursing facility  · Will continue to monitor signs and symptoms from change in her condition

## 2023-03-07 NOTE — ASSESSMENT & PLAN NOTE
· September 2022: total cholesterol 137, TG 83, HDL 41, LDL 79  · Lipid panel well controlled  · Doing well on Simvastatin 20 mg daily  · Continue current medication treatment

## 2023-03-07 NOTE — ASSESSMENT & PLAN NOTE
· As evidenced on ECHO done in September 2022, mild to moderate  · Patient denies chest pain or SOB  · Will continue to monitor for acute changes

## 2023-04-02 ENCOUNTER — TELEPHONE (OUTPATIENT)
Dept: OTHER | Facility: OTHER | Age: 88
End: 2023-04-02

## 2023-05-11 ENCOUNTER — NURSING HOME VISIT (OUTPATIENT)
Dept: GERIATRICS | Facility: OTHER | Age: 88
End: 2023-05-11

## 2023-05-11 DIAGNOSIS — K59.03 DRUG-INDUCED CONSTIPATION: ICD-10-CM

## 2023-05-11 DIAGNOSIS — N18.31 BENIGN HYPERTENSION WITH STAGE 3A CHRONIC KIDNEY DISEASE (HCC): ICD-10-CM

## 2023-05-11 DIAGNOSIS — I50.30 ACC/AHA STAGE B DIASTOLIC HEART FAILURE (HCC): ICD-10-CM

## 2023-05-11 DIAGNOSIS — I12.9 BENIGN HYPERTENSION WITH STAGE 3A CHRONIC KIDNEY DISEASE (HCC): ICD-10-CM

## 2023-05-11 DIAGNOSIS — N18.31 STAGE 3A CHRONIC KIDNEY DISEASE (HCC): ICD-10-CM

## 2023-05-11 DIAGNOSIS — Z66 DNR (DO NOT RESUSCITATE): ICD-10-CM

## 2023-05-11 DIAGNOSIS — D50.9 IRON DEFICIENCY ANEMIA, UNSPECIFIED IRON DEFICIENCY ANEMIA TYPE: ICD-10-CM

## 2023-05-11 DIAGNOSIS — R54 FRAILTY SYNDROME IN GERIATRIC PATIENT: ICD-10-CM

## 2023-05-11 DIAGNOSIS — E03.9 HYPOTHYROIDISM, UNSPECIFIED TYPE: Primary | ICD-10-CM

## 2023-05-11 DIAGNOSIS — R41.89 IMPAIRED COGNITION: ICD-10-CM

## 2023-05-11 RX ORDER — ACETAMINOPHEN 325 MG/1
650 TABLET ORAL 3 TIMES DAILY
COMMUNITY
Start: 2022-12-16

## 2023-05-11 RX ORDER — AMOXICILLIN 250 MG
1 CAPSULE ORAL
COMMUNITY

## 2023-05-12 NOTE — ASSESSMENT & PLAN NOTE
· Secondary to oral iron replacement therapy  · Nursing staff reports that she is doing well with routine Senokot-S  · We will continue with this care plan  · We will continue with monitoring for change in her condition

## 2023-05-12 NOTE — ASSESSMENT & PLAN NOTE
· Secondary to her chronic medical conditions  · She continues to require 24/7 care/support of her ADLs  · I recommend continued care/support of her ADLs as a long-term resident at the nursing facility  · We will continue to monitor for change in her condition

## 2023-05-12 NOTE — ASSESSMENT & PLAN NOTE
· She continues on oral iron replacement therapy  · We will update her laboratory values  · We will continue with monitoring for change in her condition

## 2023-05-12 NOTE — ASSESSMENT & PLAN NOTE
· She has been doing well without medication  · We will continue with monitoring for change in her condition

## 2023-05-12 NOTE — PROGRESS NOTES
[facility]  [address] [place] [address], [address] [place] [address]  Facility:  [place] [address]  Follow-up visit    NAME: Sal Middleton  AGE: 80 y o  SEX: female    DATE OF ENCOUNTER: 5/11/2023    Code status:  DNR    Assessment and Plan     1  Hypothyroidism, unspecified type  Assessment & Plan:  · September 24, 2022: TSH 1 685  · March 22, 2023: TSH: 2 48  · She is doing well with her current dosage of levothyroxine  · We will continue with clinical and periodic laboratory monitoring for change in her condition      2  Benign hypertension with stage 3a chronic kidney disease ([facility] [address] )  Assessment & Plan:  · She has been doing well without medication  · We will continue with monitoring for change in her condition      3  Frailty syndrome in geriatric patient  Assessment & Plan:  · Secondary to her chronic medical conditions  · She continues to require 24/7 care/support of her ADLs  · I recommend continued care/support of her ADLs as a long-term resident at the nursing facility  · We will continue to monitor for change in her condition      4  Stage 3a chronic kidney disease [facility])  Assessment & Plan:  · December 8, 2022: Creatinine 0 91, EGFR 58  · She is doing well  · We will continue with avoidance of nephrotoxic medications and supplements  · We will continue with clinical and periodic laboratory monitoring for change in her condition      5  Iron deficiency anemia, unspecified iron deficiency anemia type  Assessment & Plan:  · She continues on oral iron replacement therapy  · We will update her laboratory values  · We will continue with monitoring for change in her condition      6  Drug-induced constipation  Assessment & Plan:  · Secondary to oral iron replacement therapy  · Nursing staff reports that she is doing well with routine Senokot-S  · We will continue with this care plan  · We will continue with monitoring for change in her condition      7   Impaired cognition  Assessment & Plan:  · We will continue to provide a safe, secure, structured, supportive environment at the nursing facility  · We will continue with 24/7 care/support of her ADLs  · We will continue with monitoring for change in her condition      8  DNR (do not resuscitate)    9  ACC/AHA stage B diastolic heart failure (HCC)  Assessment & Plan:  · Grade 1 diastolic dysfunction seen on echocardiogram performed on September 22, 2022  · She continues to be asymptomatic  · We will continue with monitoring for change in condition            See my note of March 7, 2023 for further information  All medications and routine orders were reviewed and updated as needed  Plan discussed with: Nursing staff  Chief Complaint     She is seen for a follow-up visit to update the care and treatment of her chronic medical conditions  History of Present Illness     She is a 42-year-old woman who is seen with female nursing staff for a follow-up visit to update the care and treatment of her chronic medical conditions, including hypothyroidism, hypertension, frailty secondary to her chronic medical conditions, and stage IIIa chronic kidney disease  Staff reports that her overall clinical condition is stable, that her meal completion is fair (she is able to feed herself), that she is sleeping well, and that she is having no difficulty with constipation  Nursing staff reports that she is not exhibiting any behaviors that are disruptive to the nursing unit or distressing to her  The following portions of the patient's history were reviewed and updated as appropriate: current medications, past family history, past medical history, past social history, past surgical history and problem list     Allergies:  No Known Allergies    Review of Systems     Review of Systems   Unable to perform ROS: Other   Cognitive impairment    Medications and orders     All medications reviewed and updated in Nursing Home EMR        Objective     Vitals: "Weight 119 3 pounds (stable)    Physical Exam  Vitals reviewed  Exam conducted with a chaperone present  Constitutional:       General: She is awake  Appearance: She is well-developed  She is not toxic-appearing or diaphoretic  Comments: She appears comfortable lying in bed, her stated age, and frail  Cardiovascular:      Rate and Rhythm: Normal rate and regular rhythm  Heart sounds: Murmur (2/6 holosystolic murmur) heard  Comments: There is no pretibial edema, bilaterally  Pulmonary:      Effort: No respiratory distress  Breath sounds: Normal breath sounds  No stridor  No wheezing, rhonchi or rales  Neurological:      Mental Status: She is alert  Psychiatric:         Behavior: Behavior is cooperative  Pertinent Laboratory/Diagnostic Studies: The following labs were reviewed please see chart or hospital paperwork for details  March 22, 2023:    TSH: 2 48    CBC with differential: WBC count 5 7, hemoglobin 8 3, hematocrit 26 5, platelet count 345,669, MCV 75    TIBC profile: Iron 48 (L), TIBC 186 (L), saturation: 22 (N)    - Her order summary was reviewed and signed  Portions of the record may have been created with voice recognition software  Occasional wrong word or \"sound a like\" substitutions may have occurred due to the inherent limitations of voice recognition software  Read the chart carefully and recognize, using context, where substitutions have occurred      GEORGE Adan   5/11/2023 11:06 PM        "

## 2023-05-12 NOTE — ASSESSMENT & PLAN NOTE
· We will continue to provide a safe, secure, structured, supportive environment at the nursing facility  · We will continue with 24/7 care/support of her ADLs  · We will continue with monitoring for change in her condition

## 2023-05-12 NOTE — ASSESSMENT & PLAN NOTE
· December 8, 2022: Creatinine 0 91, EGFR 58  · She is doing well  · We will continue with avoidance of nephrotoxic medications and supplements  · We will continue with clinical and periodic laboratory monitoring for change in her condition

## 2023-05-12 NOTE — ASSESSMENT & PLAN NOTE
· September 24, 2022: TSH 1 685  · March 22, 2023: TSH: 2 48  · She is doing well with her current dosage of levothyroxine  · We will continue with clinical and periodic laboratory monitoring for change in her condition

## 2023-05-12 NOTE — ASSESSMENT & PLAN NOTE
· Grade 1 diastolic dysfunction seen on echocardiogram performed on September 22, 2022  · She continues to be asymptomatic  · We will continue with monitoring for change in condition

## 2023-06-21 ENCOUNTER — NURSING HOME VISIT (OUTPATIENT)
Dept: GERIATRICS | Facility: OTHER | Age: 88
End: 2023-06-21
Payer: MEDICARE

## 2023-06-21 DIAGNOSIS — N18.31 BENIGN HYPERTENSION WITH STAGE 3A CHRONIC KIDNEY DISEASE (HCC): ICD-10-CM

## 2023-06-21 DIAGNOSIS — N18.31 STAGE 3A CHRONIC KIDNEY DISEASE (HCC): ICD-10-CM

## 2023-06-21 DIAGNOSIS — I12.9 BENIGN HYPERTENSION WITH STAGE 3A CHRONIC KIDNEY DISEASE (HCC): ICD-10-CM

## 2023-06-21 DIAGNOSIS — E03.9 HYPOTHYROIDISM, UNSPECIFIED TYPE: Primary | ICD-10-CM

## 2023-06-21 DIAGNOSIS — K59.03 DRUG-INDUCED CONSTIPATION: ICD-10-CM

## 2023-06-21 DIAGNOSIS — R54 FRAILTY SYNDROME IN GERIATRIC PATIENT: ICD-10-CM

## 2023-06-21 DIAGNOSIS — D50.9 IRON DEFICIENCY ANEMIA, UNSPECIFIED IRON DEFICIENCY ANEMIA TYPE: ICD-10-CM

## 2023-06-21 PROCEDURE — 99309 SBSQ NF CARE MODERATE MDM 30: CPT | Performed by: INTERNAL MEDICINE

## 2023-07-10 RX ORDER — POLYETHYLENE GLYCOL 3350 17 G/17G
17 POWDER, FOR SOLUTION ORAL DAILY PRN
COMMUNITY

## 2023-07-10 NOTE — ASSESSMENT & PLAN NOTE
· Secondary to oral iron replacement therapy  · Nursing staff reports he is doing well with routine Senokot-S  · We will continue with monitoring for change in her condition

## 2023-07-10 NOTE — ASSESSMENT & PLAN NOTE
· September 24, 2022: TSH 1.685  · March 22, 2023: TSH: 2.48  · She continues to do well with levothyroxine 25 mcg daily  · We will continue with clinical and periodic laboratory monitoring for change in her condition

## 2023-07-10 NOTE — ASSESSMENT & PLAN NOTE
· We will continue with avoidance of nephrotoxic medications and supplements  · We will continue with clinical and periodic laboratory monitoring for change in her condition

## 2023-07-10 NOTE — PROGRESS NOTES
[address], [address] [place] [address]  Facility:  [place] [address]  Follow-up visit    NAME: Gaurav Moss  AGE: 80 y.o. SEX: female    DATE OF ENCOUNTER: 6/21/2023    Code status:  DNR    Assessment and Plan     1. Hypothyroidism, unspecified type  Assessment & Plan:  · September 24, 2022: TSH 1.685  · March 22, 2023: TSH: 2.48  · She continues to do well with levothyroxine 25 mcg daily  · We will continue with clinical and periodic laboratory monitoring for change in her condition      2. Frailty syndrome in geriatric patient  Assessment & Plan:  · Secondary to her chronic medical conditions  · She continues to require 24/7 care/support of her ADLs  · I recommend continued care/support of her ADLs as a long-term resident at the nursing facility  · We will continue to monitor for change in her condition      3. Stage 3a chronic kidney disease ([address])  Assessment & Plan:  · We will continue with avoidance of nephrotoxic medications and supplements  · We will continue with clinical and periodic laboratory monitoring for change in her condition      4. Drug-induced constipation  Assessment & Plan:  · Secondary to oral iron replacement therapy  · Nursing staff reports he is doing well with routine Senokot-S  · We will continue with monitoring for change in her condition      5. Iron deficiency anemia, unspecified iron deficiency anemia type  Assessment & Plan:  · We will continue with oral iron replacement therapy  · We will continue with clinical and periodic laboratory monitoring for change in her condition      6. Benign hypertension with stage 3a chronic kidney disease ([address])  Assessment & Plan:  · She has been doing well without medication  · We will have nursing staff continue to monitor her vitals  · We will continue with monitoring for change in her condition        All medications and routine orders were reviewed and updated as needed.     Plan discussed with: Nursing staff. Chief Complaint     She is seen for a follow-up visit to update the care and treatment of her chronic medical conditions. History of Present Illness     She is a 42-year-old woman who is seen for a follow-up visit to update the care and treatment of her chronic medical conditions, including hypothyroidism, frailty secondary to her chronic medical conditions, stage IIIa chronic kidney disease, and drug-induced constipation. She has no complaints for me during the visit. Nursing staff reports her overall functional status is stable, that her appetite is stable, that she is sleeping well, and that she is having no difficulty with constipation. Nursing reports that she is not exhibiting any behaviors that are distressing to her or disruptive to the nursing unit. The following portions of the patient's history were reviewed and updated as appropriate: current medications, past family history, past medical history, past social history, past surgical history and problem list.    Allergies:  No Known Allergies    Review of Systems     See HPI    Medications and orders     All medications reviewed and updated in [facility] EMR. Objective     Vitals: Weight 118.5 pounds (stable)    Physical Exam  Cardiovascular:      Rate and Rhythm: Normal rate and regular rhythm. Heart sounds: Murmur (3/6) heard. No friction rub. No gallop. Comments: There is no edema in her legs. Pulmonary:      Effort: No respiratory distress. Breath sounds: Normal breath sounds. No stridor. No wheezing, rhonchi or rales. - Her order summary was reviewed and signed. Portions of the record may have been created with voice recognition software. Occasional wrong word or "sound a like" substitutions may have occurred due to the inherent limitations of voice recognition software. Read the chart carefully and recognize, using context, where substitutions have occurred.     Jayden Dsouza, M.D.  7/10/2023 10:08 AM

## 2023-07-10 NOTE — ASSESSMENT & PLAN NOTE
· We will continue with oral iron replacement therapy  · We will continue with clinical and periodic laboratory monitoring for change in her condition

## 2023-07-10 NOTE — ASSESSMENT & PLAN NOTE
· She has been doing well without medication  · We will have nursing staff continue to monitor her vitals  · We will continue with monitoring for change in her condition

## 2024-01-02 ENCOUNTER — NURSING HOME VISIT (OUTPATIENT)
Dept: GERIATRICS | Facility: OTHER | Age: 89
End: 2024-01-02
Payer: MEDICARE

## 2024-01-02 DIAGNOSIS — N18.31 BENIGN HYPERTENSION WITH STAGE 3A CHRONIC KIDNEY DISEASE (HCC): ICD-10-CM

## 2024-01-02 DIAGNOSIS — R54 FRAILTY SYNDROME IN GERIATRIC PATIENT: ICD-10-CM

## 2024-01-02 DIAGNOSIS — N18.31 STAGE 3A CHRONIC KIDNEY DISEASE (HCC): ICD-10-CM

## 2024-01-02 DIAGNOSIS — Q80.9 XERODERMA: ICD-10-CM

## 2024-01-02 DIAGNOSIS — E03.9 HYPOTHYROIDISM, UNSPECIFIED TYPE: Primary | ICD-10-CM

## 2024-01-02 DIAGNOSIS — R60.0 BILATERAL LOWER EXTREMITY EDEMA: ICD-10-CM

## 2024-01-02 DIAGNOSIS — D64.9 ANEMIA, UNSPECIFIED TYPE: ICD-10-CM

## 2024-01-02 DIAGNOSIS — D50.9 IRON DEFICIENCY ANEMIA, UNSPECIFIED IRON DEFICIENCY ANEMIA TYPE: ICD-10-CM

## 2024-01-02 DIAGNOSIS — E78.5 HYPERLIPIDEMIA, UNSPECIFIED HYPERLIPIDEMIA TYPE: ICD-10-CM

## 2024-01-02 DIAGNOSIS — K59.03 DRUG-INDUCED CONSTIPATION: ICD-10-CM

## 2024-01-02 DIAGNOSIS — I12.9 BENIGN HYPERTENSION WITH STAGE 3A CHRONIC KIDNEY DISEASE (HCC): ICD-10-CM

## 2024-01-02 DIAGNOSIS — R41.89 IMPAIRED COGNITION: ICD-10-CM

## 2024-01-02 PROCEDURE — 99309 SBSQ NF CARE MODERATE MDM 30: CPT | Performed by: INTERNAL MEDICINE

## 2024-01-02 RX ORDER — FERROUS SULFATE 324(65)MG
324 TABLET, DELAYED RELEASE (ENTERIC COATED) ORAL
COMMUNITY
Start: 2023-06-15

## 2024-01-02 RX ORDER — LIDOCAINE 4 G/G
1 PATCH TOPICAL DAILY
COMMUNITY

## 2024-01-02 NOTE — ASSESSMENT & PLAN NOTE
We will continue to provide a safe, secure, structured, supportive environment at the nursing facility  We will continue with 24/7 care/support of her ADLs  We will continue with monitoring for change in her condition

## 2024-01-02 NOTE — ASSESSMENT & PLAN NOTE
Secondary to her chronic medical conditions  She continues to require 24/7 care/support of her ADLs  I recommend continued care/support of her ADLs as a long-term resident at the nursing facility  We will continue to monitor for change in her condition

## 2024-01-02 NOTE — ASSESSMENT & PLAN NOTE
Last Hg/Hct in July 2023 of 7.8/24.8 respectively and TIBC with iron 31, transferrin 139, % saturation 195, and ferritin of 42.0 Last Hg/Hct in July 2023 of 7.8/24.8 respectively and TIBC with iron 31, transferrin 139, % saturation 195, and ferritin of 42.0   We will continue with oral iron replacement therapy  We will continue with clinical and periodic laboratory monitoring for change in her condition: at this time will repeat CBC and ferritin since 6 months ago below 8.0 and has hx of diastolic heart failure, tricuspid and mitral regurgitation.

## 2024-01-02 NOTE — ASSESSMENT & PLAN NOTE
Found on physical exam, likely dependent related  Recommendations include feet elevation throughout the day  May be contributed by cardiac history (Most recent echo in September 2022). Perhaps consider repeat Echo in the near future to evaluate for worsening of tricuspid regurgitation.   We will continue to monitor the swelling

## 2024-01-02 NOTE — ASSESSMENT & PLAN NOTE
September 24, 2022: TSH 1.685  March 22, 2023: TSH: 2.48  She continues to do well with levothyroxine 25 mcg daily  We will continue with clinical and periodic laboratory monitoring for change in her condition; recommed repeat TSH

## 2024-01-02 NOTE — ASSESSMENT & PLAN NOTE
Secondary to oral iron replacement therapy  Nursing staff reports she is doing well with routine Senokot-S  We will continue with monitoring for change in her condition

## 2024-01-02 NOTE — ASSESSMENT & PLAN NOTE
Of bilateral lower extremities  We will provide with high lipid-to-water ratio cream and continue to monitor

## 2024-01-02 NOTE — ASSESSMENT & PLAN NOTE
She has been doing well without medication most recent bp of 130/64 and within rage per [facility] 8 of <150/90  We will have nursing staff continue to monitor her vitals  We will continue with monitoring for change in her condition

## 2024-01-02 NOTE — PROGRESS NOTES
[facility]  [address]  [place] [address]  Facility:  [facility]     NAME: Ebony Rivera  AGE: 89 y.o. SEX: female     DATE OF ENCOUNTER: 3/7/2023     Code status:  DNR     Assessment and Plan     Drug-induced constipation  Secondary to oral iron replacement therapy  Nursing staff reports she is doing well with routine Senokot-S  We will continue with monitoring for change in her condition    Hypothyroidism  September 24, 2022: TSH 1.685  March 22, 2023: TSH: 2.48  She continues to do well with levothyroxine 25 mcg daily  We will continue with clinical and periodic laboratory monitoring for change in her condition; recommed repeat TSH    CKD (chronic kidney disease)  Lab Results   Component Value Date    EGFR 40 09/29/2022    EGFR 39 09/28/2022    EGFR 45 09/27/2022    CREATININE 1.15 09/29/2022    CREATININE 1.19 09/28/2022    CREATININE 1.06 09/27/2022     Last eGFR in July 2023 of 49  Will repeat CMP to monitor for changes   We will continue with avoidance of nephrotoxic medications and supplements  We will continue with clinical and periodic laboratory monitoring for change in her condition      Iron deficiency anemia  Last Hg/Hct in July 2023 of 7.8/24.8 respectively and TIBC with iron 31, transferrin 139, % saturation 195, and ferritin of 42.0 Last Hg/Hct in July 2023 of 7.8/24.8 respectively and TIBC with iron 31, transferrin 139, % saturation 195, and ferritin of 42.0   We will continue with oral iron replacement therapy  We will continue with clinical and periodic laboratory monitoring for change in her condition: at this time will repeat CBC and ferritin since 6 months ago below 8.0 and has hx of diastolic heart failure, tricuspid and mitral regurgitation.     Frailty syndrome in geriatric patient  Secondary to her chronic medical conditions  She continues to require 24/7 care/support of her ADLs  I recommend continued care/support of her ADLs as a  long-term resident at the nursing facility  We will continue to monitor for change in her condition    Benign hypertension with stage 3a chronic kidney disease (HCC)  She has been doing well without medication most recent bp of 130/64 and within rage per [facility] 8 of <150/90  We will have nursing staff continue to monitor her vitals  We will continue with monitoring for change in her condition    Bilateral lower extremity edema  Found on physical exam, likely dependent related  Recommendations include feet elevation throughout the day  May be contributed by cardiac history (Most recent echo in September 2022). Perhaps consider repeat Echo in the near future to evaluate for worsening of tricuspid regurgitation.   We will continue to monitor the swelling    Xeroderma  Of bilateral lower extremities  We will provide with high lipid-to-water ratio cream and continue to monitor      Impaired cognition  We will continue to provide a safe, secure, structured, supportive environment at the nursing facility  We will continue with 24/7 care/support of her ADLs  We will continue with monitoring for change in her condition    HLD (hyperlipidemia)  September 20, 2022: Fasting lipid profile: Total cholesterol 137, triglycerides 83, HDL 41, LDL 79  December 8, 2022: LFTs within normal limits except albumin 2.4 and total protein 6  Last documented abnormal lipid panel in our system from 2019 with LDL of  109  Currently on Simvastatin 20mg daily appears to be secondary prevention. Will repeat fasting lipid panel and depending on results may consider discontinuing simvastatin in this 89 yo patient     All medications and routine orders were reviewed and updated as needed.     Plan discussed with: Nurse     Chief Complaint     Ebony Rivera is a 89 yo female patient who is being seen in the Nursing home to follow-up regarding her chronic medical conditions.       History of Present Illness     Ebony Rivera is a 89 yo female patient  PMH of iron deficiency anemia, hypothyroidism, stage 3 CKD and drug induced constipation secondary to iron supplementation.     Overall patient is stable. She requires assistance with feeding and will eat the whole meal. Per nursing staff sleeping and bowel habits are consistently well. No changes in bowel color. Patient is overall doing well.     The following portions of the patient's history were reviewed and updated as appropriate: current medications, past family history, past medical history, past social history, past surgical history and problem list.     Allergies:  No Known Allergies     Review of Systems      Review of Systems   Constitutional:  Negative for appetite change and fever.   Respiratory:  Negative for shortness of breath.    Cardiovascular:  Positive for leg swelling. Negative for chest pain and palpitations.   Gastrointestinal:  Negative for abdominal pain and constipation.   Endocrine: Negative for cold intolerance.   Skin:  Negative for rash.   Hematological:  Does not bruise/bleed easily.   Psychiatric/Behavioral:  Negative for behavioral problems and sleep disturbance.       Medications and orders      All medications reviewed and updated in Nursing Home EMR.     Orders per [facility] and ordering booklet.    Objective       BP: 130/64 mmHg  1/1/2024 12:07 Temp:97.9 °F  11/17/2023 03:27 Pulse:81 bpm  1/1/2024 12:07 Weight:115.9 Lbs  12/5/2023 11:34   Resp:16 Breaths/min  12/18/2022 19:24 BS: O2:96 %  12/18/2022 19:24 Pain:0  1/2/2024 00:31        Physical Exam  Constitutional:       Appearance: Normal appearance. She is normal weight.   HENT:      Head: Normocephalic and atraumatic.      Right Ear: External ear normal.      Left Ear: External ear normal.      Nose: Nose normal. No congestion or rhinorrhea.      Mouth/Throat:      Mouth: Mucous membranes are moist.      Pharynx: Oropharynx is clear.      Comments: Missing teeth  Eyes:      General: No scleral icterus.        Right eye:  No discharge.         Left eye: No discharge.      Extraocular Movements: Extraocular movements intact.      Conjunctiva/sclera: Conjunctivae normal.      Comments: Bilateral ptosis   Cardiovascular:      Rate and Rhythm: Normal rate and regular rhythm.      Pulses: Normal pulses.      Heart sounds: Murmur (systolic murmur heard in all 4 quadrants) heard.      No friction rub. No gallop.   Pulmonary:      Effort: Pulmonary effort is normal.      Breath sounds: Normal breath sounds. No wheezing, rhonchi or rales.   Abdominal:      General: Abdomen is flat. Bowel sounds are normal. There is no distension.      Palpations: Abdomen is soft.      Tenderness: There is no abdominal tenderness.   Musculoskeletal:         General: Normal range of motion.      Cervical back: Normal range of motion.      Right lower leg: Edema (1+ up to level of knee 2+ around ankle) present.      Left lower leg: Edema (1+ around ankle) present.      Comments: Upper and lower extremity strength 3+    Skin:     General: Skin is warm and dry.      Capillary Refill: Capillary refill takes less than 2 seconds.      Findings: No rash.      Comments: Xeroderma bilateral lower extremities   Neurological:      General: No focal deficit present.      Mental Status: She is alert and oriented to person, place, and time.      Motor: Weakness present.      Comments: Able to follow conversation and converse, sometimes doesn't answer questions and looks away.   Psychiatric:         Mood and Affect: Mood normal.         Behavior: Behavior normal.         Thought Content: Thought content normal.         Judgment: Judgment normal.

## 2024-01-02 NOTE — ASSESSMENT & PLAN NOTE
Lab Results   Component Value Date    EGFR 40 09/29/2022    EGFR 39 09/28/2022    EGFR 45 09/27/2022    CREATININE 1.15 09/29/2022    CREATININE 1.19 09/28/2022    CREATININE 1.06 09/27/2022     Last eGFR in July 2023 of 49  Will repeat CMP to monitor for changes   We will continue with avoidance of nephrotoxic medications and supplements  We will continue with clinical and periodic laboratory monitoring for change in her condition

## 2024-01-03 NOTE — ASSESSMENT & PLAN NOTE
September 20, 2022: Fasting lipid profile: Total cholesterol 137, triglycerides 83, HDL 41, LDL 79  December 8, 2022: LFTs within normal limits except albumin 2.4 and total protein 6  Last documented abnormal lipid panel in our system from 2019 with LDL of  109  Currently on Simvastatin 20mg daily appears to be secondary prevention. Will repeat fasting lipid panel and depending on results may consider discontinuing simvastatin in this 89 yo patient

## 2024-03-04 ENCOUNTER — NURSING HOME VISIT (OUTPATIENT)
Dept: GERIATRICS | Facility: OTHER | Age: 89
End: 2024-03-04
Payer: MEDICARE

## 2024-03-04 DIAGNOSIS — E78.5 HYPERLIPIDEMIA, UNSPECIFIED HYPERLIPIDEMIA TYPE: ICD-10-CM

## 2024-03-04 DIAGNOSIS — R41.89 IMPAIRED COGNITION: Primary | ICD-10-CM

## 2024-03-04 DIAGNOSIS — E03.9 HYPOTHYROIDISM, UNSPECIFIED TYPE: ICD-10-CM

## 2024-03-04 DIAGNOSIS — D50.9 IRON DEFICIENCY ANEMIA, UNSPECIFIED IRON DEFICIENCY ANEMIA TYPE: ICD-10-CM

## 2024-03-04 PROCEDURE — 99309 SBSQ NF CARE MODERATE MDM 30: CPT | Performed by: INTERNAL MEDICINE

## 2024-03-05 NOTE — ASSESSMENT & PLAN NOTE
THYROID STIM HORMONE 3.54 uIU/mL 0.45-5.33   1/3/2024  Stable  Check TSH periodically  Pt on levothyroxine 25 mcg 1 tab po qam

## 2024-03-05 NOTE — ASSESSMENT & PLAN NOTE
Pt only oriented to self  Per staff, pt at baseline  Monitor sleep/wake cycle  Avoid deliriogenic agents  Meal intake: % mostly good  BM: almost daily  Weight: stable at 118.4  Redirect, reorient and provide distractions

## 2024-03-28 NOTE — ASSESSMENT & PLAN NOTE
· BIMS assessment completed by LETICIA 2/22/23  · Patient scored 6/15, with severely impaired cognition result  · Will suggest to family that patient may benefit from further neuro/cog testing  · Continue to monitor for acute changes in condition 
· Patient seen by request of nursing for complaint by family that patient is having pain  · When patient asked if she is having pain she replied "no "  When aptient asked if she is having back pain she replied "yes "  · Full musculoskeletal examination performed that was wnl  · Other than gait abnormality and instability patient had normal ROM  · Will order lidocaine 4% patch on am and off pm to be applied for midline lower back to see if this helps with intermittent pain if this is what patient may be experiencing  · Continue Tylenol prn for pain  · Consult placed for physiatry Dr Yola Torres to eval patient  · Continue to monitor patient for acute changes in condition
• Maintain fall and safety precautions  • Encourage use of call bell and asst devices  • Continue PT/OT services  • Assist with transfers, mobility, and ADLs
• Patient with multiple Comorbidities  • Patient continues to require 24/7 long-term care in a facility in which supportive services and ADLs can be provided  • Maintain fall and safety precautions  • Continue to monitor patient for decline
Class II - visualization of the soft palate, fauces, and uvula

## 2024-04-24 ENCOUNTER — NURSING HOME VISIT (OUTPATIENT)
Dept: GERIATRICS | Facility: OTHER | Age: 89
End: 2024-04-24
Payer: MEDICARE

## 2024-04-24 DIAGNOSIS — E78.5 HYPERLIPIDEMIA, UNSPECIFIED HYPERLIPIDEMIA TYPE: ICD-10-CM

## 2024-04-24 DIAGNOSIS — E03.9 HYPOTHYROIDISM, UNSPECIFIED TYPE: ICD-10-CM

## 2024-04-24 DIAGNOSIS — I12.9 BENIGN HYPERTENSION WITH STAGE 3A CHRONIC KIDNEY DISEASE (HCC): Primary | ICD-10-CM

## 2024-04-24 DIAGNOSIS — Z00.00 PREVENTATIVE HEALTH CARE: ICD-10-CM

## 2024-04-24 DIAGNOSIS — N18.31 BENIGN HYPERTENSION WITH STAGE 3A CHRONIC KIDNEY DISEASE (HCC): Primary | ICD-10-CM

## 2024-04-24 DIAGNOSIS — N18.31 STAGE 3A CHRONIC KIDNEY DISEASE (HCC): ICD-10-CM

## 2024-04-24 PROCEDURE — 99309 SBSQ NF CARE MODERATE MDM 30: CPT | Performed by: INTERNAL MEDICINE

## 2024-04-24 NOTE — PROGRESS NOTES
[facility]  [address] [address] [phone]  [facility]  [address]      NAME: Ebony Rivera  AGE: 89 y.o. SEX: female [phone]    DATE OF ENCOUNTER: 4/24/2024    Assessment and Plan     1. Benign hypertension with stage 3a chronic kidney disease (HCC)        2. Hypothyroidism, unspecified type        3. Stage 3a chronic kidney disease (HCC)        4. Hyperlipidemia, unspecified hyperlipidemia type        5. Preventative health care           Benign hypertension with stage 3a chronic kidney disease (HCC)  154 / 68 mmHg Sitting l/arm 4/22/2024 13:30     4/15/2024 13:51 135 / 66 mmHg Sitting r/arm     4/8/2024 08:46 141 / 68 mmHg Lying r/arm     4/1/2024 14:23 141 / 68 mmHg      Average /70s  Stable  Cont to monitor off meds    Hypothyroidism  1/3/2024 TSH 3.54  Stable  Cont levothyroxine 25 mcg 1 tab po qam    CKD (chronic kidney disease)  Lab Results   Component Value Date    EGFR 40 09/29/2022    EGFR 39 09/28/2022    EGFR 45 09/27/2022    CREATININE 1.15 09/29/2022    CREATININE 1.19 09/28/2022    CREATININE 1.06 09/27/2022   1/3/2024 Cr stable at 0.84   GLUCOSE 84 mg/dL 65-99  Final              BUN 22 mg/dL 7-25  Final             CREATININE 0.84 mg/dL 0.40-1.10  Final             SODIUM 142 mmol/L 135-145  Final             POTASSIUM 4.4 mmol/L 3.5-5.2  Final             CHLORIDE 104 mmol/L 100-109  Final             CARBON DIOXIDE 31 mmol/L 21-31  Final             CALCIUM 8.6 mg/dL 8.5-10.1  Final             ALKALINE PHOSPHATASE 41 U/L   Final             ALBUMIN 2.9 g/dL 3.5-5.7 L Final             BILIRUBIN,TOTAL 0.3 mg/dL 0.2-1.0  Final     Eltrombopag and its metabolites may interfere with this assay causing   erroneously high patient results.        PROTEIN, TOTAL 5.6 g/dL 6.3-8.3 L Final             AST 8 U/L <41  Final             ALT 5 U/L <56  Final             ANION GAP 7  3-11  Final             eGFRcr 64  >59         HLD  (hyperlipidemia)  1/3/2024 lipid panel: ldl 73  Stable  Cont simvastatin 20 mg 1 tab po QHS    Preventative health care  SARS-COV-2 (COVID-19)  (Dose 1) 11/17/2023            Influenza  10/2/2023       Code status: DNR    Chief Complaint     STR term follow-up visit.    History of Present Illness   Pt seen and examined. Pt in her room. Pt oriented to self. Pt says she feels fine.    The following portions of the patient's history were reviewed and updated as appropriate: allergies, current medications, past family history, past medical history, past social history, past surgical history and problem list.    Review of Systems     Review of Systems   Constitutional:  Negative for chills and fever.   All other systems reviewed and are negative.      A comprehensive review of symptoms was obtained.  Pertinent positives and negatives noted in HPI.     Objective     Vitals: Reviewed in [employer] system. VSS    General: Awake, alert to self only; not to time  Head: atraumatic, normocephalic  Cardiovascular: RRR, +heart murmur  Lungs: Clear to auscultation bilaterally  Abdomen: nontender/nondistended, +BS  Legs: no cyanosis, clubbing or edema  Skin: clean, dry  Psych: calm, cooperative    Pertinent Laboratory/Diagnostic Studies:  Refer to facility chart.    Current Medications   Medications reviewed and updated in facility chart.    Kyra Canseco MD  Internal Medicine  Senior Care Physician

## 2024-04-25 NOTE — ASSESSMENT & PLAN NOTE
Lab Results   Component Value Date    EGFR 40 09/29/2022    EGFR 39 09/28/2022    EGFR 45 09/27/2022    CREATININE 1.15 09/29/2022    CREATININE 1.19 09/28/2022    CREATININE 1.06 09/27/2022   1/3/2024 Cr stable at 0.84   GLUCOSE 84 mg/dL 65-99  Final              BUN 22 mg/dL 7-25  Final             CREATININE 0.84 mg/dL 0.40-1.10  Final             SODIUM 142 mmol/L 135-145  Final             POTASSIUM 4.4 mmol/L 3.5-5.2  Final             CHLORIDE 104 mmol/L 100-109  Final             CARBON DIOXIDE 31 mmol/L 21-31  Final             CALCIUM 8.6 mg/dL 8.5-10.1  Final             ALKALINE PHOSPHATASE 41 U/L   Final             ALBUMIN 2.9 g/dL 3.5-5.7 L Final             BILIRUBIN,TOTAL 0.3 mg/dL 0.2-1.0  Final     Eltrombopag and its metabolites may interfere with this assay causing   erroneously high patient results.        PROTEIN, TOTAL 5.6 g/dL 6.3-8.3 L Final             AST 8 U/L <41  Final             ALT 5 U/L <56  Final             ANION GAP 7  3-11  Final             eGFRcr 64  >59

## 2024-06-14 ENCOUNTER — NURSING HOME VISIT (OUTPATIENT)
Dept: GERIATRICS | Facility: OTHER | Age: 89
End: 2024-06-14
Payer: MEDICARE

## 2024-06-14 DIAGNOSIS — N18.31 STAGE 3A CHRONIC KIDNEY DISEASE (HCC): ICD-10-CM

## 2024-06-14 DIAGNOSIS — N18.31 BENIGN HYPERTENSION WITH STAGE 3A CHRONIC KIDNEY DISEASE (HCC): ICD-10-CM

## 2024-06-14 DIAGNOSIS — I12.9 BENIGN HYPERTENSION WITH STAGE 3A CHRONIC KIDNEY DISEASE (HCC): ICD-10-CM

## 2024-06-14 DIAGNOSIS — E78.5 HYPERLIPIDEMIA, UNSPECIFIED HYPERLIPIDEMIA TYPE: ICD-10-CM

## 2024-06-14 DIAGNOSIS — E03.9 HYPOTHYROIDISM, UNSPECIFIED TYPE: Primary | ICD-10-CM

## 2024-06-14 PROCEDURE — 99309 SBSQ NF CARE MODERATE MDM 30: CPT | Performed by: INTERNAL MEDICINE

## 2024-06-14 NOTE — PROGRESS NOTES
[facility]  [address] [address] [phone]  [facility]  [address]      NAME: Ebony Rivera  AGE: 89 y.o. SEX: female [phone]    DATE OF ENCOUNTER: 6/14/2024    Assessment and Plan     1. Hypothyroidism, unspecified type        2. Benign hypertension with stage 3a chronic kidney disease (HCC)        3. Hyperlipidemia, unspecified hyperlipidemia type        4. Stage 3a chronic kidney disease (HCC)           Hypothyroidism  1/3/2024 TSH 3.54  Stable  Cont levothyroxine 25 mcg 1 tab po qam    Benign hypertension with stage 3a chronic kidney disease (HCC)  127 / 71 mmHg 6/10/2024 10:38     6/3/2024 09:00 124 / 63 mmHg     5/27/2024 11:05 136 / 72 mmHg     5/20/2024 15:07 138 / 79 mmHg     5/13/2024 13:34 142 / 62 mmHg     5/6/2024 08:22 146 / 72 mmHg     BP stable  Monitor off meds    HLD (hyperlipidemia)  Last ldl 73 on 1/3/2024  Stable  Cont simvastatin 20 mg 1 tab po QHS    CKD (chronic kidney disease)  Lab Results   Component Value Date    EGFR 40 09/29/2022    EGFR 39 09/28/2022    EGFR 45 09/27/2022    CREATININE 1.15 09/29/2022    CREATININE 1.19 09/28/2022    CREATININE 1.06 09/27/2022   Cr stable 0.84 on 1/3/2024 per PCC  AVOID NSAIDS/Nephrotoxins/IV contrast       Code status: DNR    Chief Complaint     Long term follow-up visit. Orders signed    History of Present Illness   Pt seen and examined. Pt in bed. Pt denies any pain. Pt only oriented to self.    The following portions of the patient's history were reviewed and updated as appropriate: allergies, current medications, past family history, past medical history, past social history, past surgical history and problem list.    Review of Systems     Review of Systems   Unable to perform ROS: Age     A comprehensive review of symptoms was obtained.  Pertinent positives and negatives noted in HPI.     Objective     Vitals: Reviewed in [employer] system. VSS    General: Awake, alert, only oriented to self  Head:  atraumatic, normocephalic  Cardiovascular: RRR  Lungs: Clear to auscultation bilaterally  Abdomen: nontender/nondistended, +BS  Legs: no cyanosis, clubbing or edema  Feet: +b/l pedal edema  Skin: clean, dry  Psych: calm, cooperative    Pertinent Laboratory/Diagnostic Studies:  Refer to facility chart.    Current Medications   Medications reviewed and updated in facility chart.    Kyra Canseco MD  Internal Medicine  Senior Care Physician

## 2024-06-18 NOTE — ASSESSMENT & PLAN NOTE
127 / 71 mmHg 6/10/2024 10:38     6/3/2024 09:00 124 / 63 mmHg     5/27/2024 11:05 136 / 72 mmHg     5/20/2024 15:07 138 / 79 mmHg     5/13/2024 13:34 142 / 62 mmHg     5/6/2024 08:22 146 / 72 mmHg     BP stable  Monitor off meds

## 2024-06-18 NOTE — ASSESSMENT & PLAN NOTE
Lab Results   Component Value Date    EGFR 40 09/29/2022    EGFR 39 09/28/2022    EGFR 45 09/27/2022    CREATININE 1.15 09/29/2022    CREATININE 1.19 09/28/2022    CREATININE 1.06 09/27/2022   Cr stable 0.84 on 1/3/2024 per PCC  AVOID NSAIDS/Nephrotoxins/IV contrast

## 2024-07-26 ENCOUNTER — NURSING HOME VISIT (OUTPATIENT)
Dept: GERIATRICS | Facility: OTHER | Age: 89
End: 2024-07-26
Payer: MEDICARE

## 2024-07-26 DIAGNOSIS — E78.5 HYPERLIPIDEMIA, UNSPECIFIED HYPERLIPIDEMIA TYPE: ICD-10-CM

## 2024-07-26 DIAGNOSIS — E03.9 HYPOTHYROIDISM, UNSPECIFIED TYPE: Primary | ICD-10-CM

## 2024-07-26 DIAGNOSIS — K59.03 DRUG-INDUCED CONSTIPATION: ICD-10-CM

## 2024-07-26 DIAGNOSIS — F03.90 DEMENTIA, UNSPECIFIED DEMENTIA SEVERITY, UNSPECIFIED DEMENTIA TYPE, UNSPECIFIED WHETHER BEHAVIORAL, PSYCHOTIC, OR MOOD DISTURBANCE OR ANXIETY (HCC): ICD-10-CM

## 2024-07-26 DIAGNOSIS — D50.9 IRON DEFICIENCY ANEMIA, UNSPECIFIED IRON DEFICIENCY ANEMIA TYPE: ICD-10-CM

## 2024-07-26 PROCEDURE — 99309 SBSQ NF CARE MODERATE MDM 30: CPT | Performed by: INTERNAL MEDICINE

## 2024-07-26 NOTE — PROGRESS NOTES
"[facility]  [address] [place] [address]  [phone]  [facility]  [address]      NAME: Ebony Rivera  AGE: 89 y.o. SEX: female [phone]    DATE OF ENCOUNTER: 7/26/2024    Assessment and Plan     1. Hypothyroidism, unspecified type        2. Hyperlipidemia, unspecified hyperlipidemia type        3. Iron deficiency anemia, unspecified iron deficiency anemia type        4. Drug-induced constipation        5. Dementia, unspecified dementia severity, unspecified dementia type, unspecified whether behavioral, psychotic, or mood disturbance or anxiety (HCC)           Hypothyroidism  Tsh 3.54 on 1/3/2024  Stable  Cont levothyroxine 25 mcg 1 tab po qam    HLD (hyperlipidemia)  1/3/2024 ldl 73  Stable  Cont simvastatin 20 mg 1 tab po QHS    Iron deficiency anemia  8.1/25.8 cbc on 2/1/2024  Stable  Cont ferrous sulfate 324 mg 1 tab po qam    Drug-induced constipation  Pt with daily BM (reviewed PCC)  Stable  Cont docusate, sennakot and prn stool softners    Dementia (HCC)  9/27/2022 [facility] occupational therapy assessment indicated pt \" SBT administered: scored 17 indiciating impairment consistent c dementia (>10 category) + SLCT scored 49 indicating high concern for visual scanning difficulties.\"  Monitor sleep/wake cycle  Avoid deliriogenic agents  Redirect, reorient and provide distraction techniques  Assist with adls/iadls  Meal intake: fair 50%  Daily BM  Weight: pt with 3.2 pound weight loss  7/3/2024 10:37 113.8 Lbs Mechanical lift scale       6/5/2024 11:10 117.0 Lbs      Encourage good po intake; staff to feed pt all meals  Add ensure 1 can po BID post meals       Code status: DNR    Chief Complaint     Long term follow-up visit. Monthly orders signed    History of Present Illness   Pt seen and examined. Pt with eyes closed but following commands to eat. CNA at bedside.    The following portions of the patient's history were reviewed and updated as appropriate: allergies, " current medications, past family history, past medical history, past social history, past surgical history and problem list.    Review of Systems     Review of Systems   Unable to perform ROS: Dementia     A comprehensive review of symptoms was obtained.  Pertinent positives and negatives noted in HPI.     Objective     Vitals: Reviewed in [employer] system. VSS    General: Awake, alert dementia  Head: atraumatic, normocephalic  Cardiovascular: RRR  Lungs: Clear to auscultation bilaterally  Abdomen: nontender/nondistended, +BS  Legs: no cyanosis, clubbing or edema  Feet: +b/l pedal pulses  Skin: clean, dry  Psych: calm, cooperative    Pertinent Laboratory/Diagnostic Studies:  Refer to facility chart.    Current Medications   Medications reviewed and updated in facility chart.    Tylenol 325mg (pain) Give 2 tablet by mouth every 4 hours as needed for Mild Pain 10/7/2022       Tylenol 325mg (fever) Give 2 tablet by mouth every 4 hours as needed for fever Administer 2 tablets (650MG) by mouth every 4 hours as needed for increased temperature. Document temperature (Max 3GM/24HR) Pharmacy Active 10/7/2022 16:49  10/7/2022   LEVOTHYROXIN TAB 25MCG 1 TAB BY MOUTH ONCE DAILY FOR THYROID(Indications for Use: hypothyroid) Pharmacy Active 10/8/2022 06:00  10/7/2022   BENADRYL ITCH STOP 1-0.1% APPLY TOPICALLY TO AFFECTED AREA OF CHEST AND NECK EVERY 6 HOURS AS NEEDED FOR ITCH/RASH Pharmacy Active 10/18/2022 12:06  10/18/2022   ACETAMINOPHEN 325MG TABS 2 TABS (650MG) BY MOUTH THREE TIMES DAILY @0900/1400/2100; *MAX 3 GM ACETAMINOPHEN/24HRS*(Indications for Use: Pain) Pharmacy Active 12/16/2022 21:00  12/16/2022   DiphenhydrAMINE HCL CAPS 25MG 1 CAP BY MOUTH EVERY 6 HOURS AS NEEDED FOR HIVES/ ITCHING(Indications for Use: hives/itching) Pharmacy Active 4/2/2023 17:59  4/2/2023   THERA-TABS M TAB 1 TAB BY MOUTH ONCE DAILY FOR SUPPLEMENT Pharmacy Active 6/15/2023 09:00  6/14/2023   SIMVASTATIN TAB 20MG 1 TAB BY MOUTH AT BEDTIME  FOR HLD(Related Diagnoses: HYPERLIPIDEMIA, UNSPECIFIED (E78.5)) Pharmacy Active 7/3/2023 21:00  7/3/2023   SENNA 8.6MG TAB 2 TABS BY MOUTH AT BEDTIME(Indications for Use: Constipation) Pharmacy Active 7/3/2023 21:00  7/3/2023   POLYETH GLYC POW 3350 NF MEASURE 17GMS IN DOSING CAP, MIX WITH 4OZ OF SUITABLE LIQUID AND ADMINISTER BY MOUTH ONCE DAILY AS NEEDED FOR CONSTIPATION Pharmacy Active 7/3/2023 13:26  7/3/2023   Sorbitol Solution 70 % Give 30 ml by mouth as needed for constipation administer every 3 days prn Pharmacy Active 7/10/2023 12:43  7/10/2023   Fleet Mineral Oil Enema Insert 1 unit rectally as needed for constipation Q 3rd day PRN Pharmacy Active 7/10/2023 12:43  7/10/2023   EYE ALLERGY SOL ITCH/RED 1 DROP IN BOTH EYES ONCE DAILY **ALLOW A 5 MINUTE PERIOD BEFORE INSTILLING ADDITIONAL OPHTHALMIC MEDICATIONS*(Indications for Use: Dry eye) Pharmacy Active 7/26/2023 09:00  7/25/2023   REFRESH TEARS 0.5% DROP INSTILL 1 DROP IN BOTH EYES THREE TIMES DAILY ONGOING **ALLOW A 5 MINUTE PERIOD BEFORE INSTILLING ADDITIONAL OPHTHALMIC MEDICATIONS*(Indications for Use: Dry eye) Pharmacy Active 1/31/2024 17:00  1/31/2024   GENTEAL TEAR OIN NT-TIME INSTILL INTO LEFT EYE EVERY 12 HOURS FOR DRY EYE **ALLOW A 5 MINUTE PERIOD BEFORE INSTILLING ADDITIONAL OPHTHALMIC MEDICATIONS** Pharmacy Active 2/27/2024 21:00  2/27/2024   POLYETHYLENE GLYCOL 3350 MEASURE 17GMS IN DOSING CAP, MIX WITH SUITABLE LIQUID AND ADMINISTER BY MOUTH ONCE DAILY AS NEEDED FOR CONSTIPATION Pharmacy Active 3/6/2024 15:24  3/6/2024   FERROUS SULFATE 324MG TBEC 1 TAB BY MOUTH EVERY EVENING FOR ANEMIA TAKE WITH BREAK            Kyra Canseco MD  Internal Medicine  Senior Care Physician

## 2024-07-29 PROBLEM — F03.90 DEMENTIA (HCC): Status: ACTIVE | Noted: 2023-02-27

## 2024-08-27 ENCOUNTER — NURSING HOME VISIT (OUTPATIENT)
Dept: GERIATRICS | Facility: OTHER | Age: 89
End: 2024-08-27
Payer: MEDICARE

## 2024-08-27 DIAGNOSIS — H10.9 BACTERIAL CONJUNCTIVITIS OF BOTH EYES: Primary | ICD-10-CM

## 2024-08-27 DIAGNOSIS — E03.9 HYPOTHYROIDISM, UNSPECIFIED TYPE: ICD-10-CM

## 2024-08-27 DIAGNOSIS — E78.5 HYPERLIPIDEMIA, UNSPECIFIED HYPERLIPIDEMIA TYPE: ICD-10-CM

## 2024-08-27 DIAGNOSIS — F03.90 DEMENTIA, UNSPECIFIED DEMENTIA SEVERITY, UNSPECIFIED DEMENTIA TYPE, UNSPECIFIED WHETHER BEHAVIORAL, PSYCHOTIC, OR MOOD DISTURBANCE OR ANXIETY (HCC): ICD-10-CM

## 2024-08-27 DIAGNOSIS — B96.89 BACTERIAL CONJUNCTIVITIS OF BOTH EYES: Primary | ICD-10-CM

## 2024-08-27 PROCEDURE — 99309 SBSQ NF CARE MODERATE MDM 30: CPT | Performed by: INTERNAL MEDICINE

## 2024-08-27 NOTE — PROGRESS NOTES
[facility]  [address] [place] [address]  [phone]  [facility]  [address]      NAME: Ebony Rivera  AGE: 89 y.o. SEX: female [phone]    DATE OF ENCOUNTER: 8/27/2024    Assessment and Plan     1. Bacterial conjunctivitis of both eyes        2. Hyperlipidemia, unspecified hyperlipidemia type        3. Hypothyroidism, unspecified type        4. Dementia, unspecified dementia severity, unspecified dementia type, unspecified whether behavioral, psychotic, or mood disturbance or anxiety (HCC)           Bacterial conjunctivitis of both eyes  Pt with noted discharge around both eyes  Start tobramycin eye drops both eyes x 7 days    HLD (hyperlipidemia)  LDL 73 on 1/3/2024  Stable  Cont simvastatin 20 mg 1 tab po QHS    Hypothyroidism  1/3/2024 TSH 3.54  Stable  Cont levothyroxine 25 mcg 1 tab po qam    Dementia (HCC)  Monitor sleep/wake cycle  Assist with adls/iadls  Redirect, reorient and provide distraction tecniques  Meal intake: poor 25-50%  BM: daily  Weight: pt with 5.8 pound weight loss  Pt already on every bite counts diet & staff feeds pt  8/9/2024 08:11 108.0 Lbs Mechanical lift scale       7/3/2024 10:37 113.8 Lbs      Pt on daily house shakes  Increase to TID       Code status: DNR    Chief Complaint     Long term follow-up visit.    History of Present Illness   Pt seen and examined. Pt has eyes closed but is conversive. Pt with noted discharge around both her eyes.    The following portions of the patient's history were reviewed and updated as appropriate: allergies, current medications, past family history, past medical history, past social history, past surgical history and problem list.    Review of Systems     Review of Systems   Unable to perform ROS: Dementia     A comprehensive review of symptoms was obtained.  Pertinent positives and negatives noted in HPI.     Objective     Vitals: Reviewed in [employer] system. VSS    Weight: 108.0 8/9/2024 08:11         Blood Pressure: 106 /  58 8/26/2024 13:51        Temperature: 97.9 11/17/2023 03:27        Pulse: 85 8/26/2024 13:51        Respirations: 16 12/18/2022 19:24        Blood Sugar:           O2 sats: 96.0 % 12/18/2022 19:24        Height: 65.0 10/7/2022 14:55        Pain Level: 0 8/30/2024 09:33         General: Awake, alert dementia  Head: atraumatic, normocephalic  Eyes: discharge in both eyes; eyelids closed  Cardiovascular: RRR  Lungs: Clear to auscultation bilaterally  Abdomen: nontender/nondistended, +BS  Legs: no cyanosis, clubbing or edema  Feet: +pedal pulses b/l  Skin: clean, dry  Psych: calm, cooperative    Pertinent Laboratory/Diagnostic Studies:  Refer to facility chart.    Current Medications   Medications reviewed and updated in facility chart.    Kyra Canseco MD  Internal Medicine  Senior Care Physician

## 2024-08-30 PROBLEM — H10.9 BACTERIAL CONJUNCTIVITIS OF BOTH EYES: Status: ACTIVE | Noted: 2024-08-30

## 2024-08-30 PROBLEM — B96.89 BACTERIAL CONJUNCTIVITIS OF BOTH EYES: Status: ACTIVE | Noted: 2024-08-30

## 2024-08-30 NOTE — ASSESSMENT & PLAN NOTE
Monitor sleep/wake cycle  Assist with adls/iadls  Redirect, reorient and provide distraction tecniques  Meal intake: poor 25-50%  BM: daily  Weight: pt with 5.8 pound weight loss  Pt already on every bite counts diet & staff feeds pt  8/9/2024 08:11 108.0 Lbs Mechanical lift scale       7/3/2024 10:37 113.8 Lbs      Pt on daily house shakes  Increase to TID

## 2024-09-26 ENCOUNTER — NURSING HOME VISIT (OUTPATIENT)
Dept: GERIATRICS | Facility: OTHER | Age: 89
End: 2024-09-26
Payer: MEDICARE

## 2024-09-26 DIAGNOSIS — E03.9 HYPOTHYROIDISM, UNSPECIFIED TYPE: ICD-10-CM

## 2024-09-26 DIAGNOSIS — N18.31 STAGE 3A CHRONIC KIDNEY DISEASE (HCC): ICD-10-CM

## 2024-09-26 DIAGNOSIS — F03.90 DEMENTIA, UNSPECIFIED DEMENTIA SEVERITY, UNSPECIFIED DEMENTIA TYPE, UNSPECIFIED WHETHER BEHAVIORAL, PSYCHOTIC, OR MOOD DISTURBANCE OR ANXIETY (HCC): Primary | ICD-10-CM

## 2024-09-26 DIAGNOSIS — E78.5 HYPERLIPIDEMIA, UNSPECIFIED HYPERLIPIDEMIA TYPE: ICD-10-CM

## 2024-09-26 PROCEDURE — 99309 SBSQ NF CARE MODERATE MDM 30: CPT | Performed by: INTERNAL MEDICINE

## 2024-09-26 NOTE — PROGRESS NOTES
[facility]  [address] [place] [address]  [phone]  [facility]  [address]      NAME: Ebony Rivera  AGE: 89 y.o. SEX: female [phone]    DATE OF ENCOUNTER: 9/26/2024    Assessment and Plan     1. Dementia, unspecified dementia severity, unspecified dementia type, unspecified whether behavioral, psychotic, or mood disturbance or anxiety (HCC)        2. Hypothyroidism, unspecified type        3. Hyperlipidemia, unspecified hyperlipidemia type        4. Stage 3a chronic kidney disease (HCC)           Dementia (HCC)  Assist with adls/iadls  Cont 24/7 longterm supportive care  Avoid deliriogenic agents  Meal intake: usually 25%; at times 75%  Weight: pt with 4 pound weight loss  9/10/2024 11:22 104.0 Lbs Mechanical lift scale      8/9/2024 08:11 108.0 Lbs     BM: almost daily  Cont house shakes TID  Redirect, reorient and provide distraction techniques    Hypothyroidism  Stable  Cont levothyroxine 25 mcg 1 tab po qam    HLD (hyperlipidemia)  Stable  Cont statin    CKD (chronic kidney disease)  Lab Results   Component Value Date    EGFR 40 09/29/2022    EGFR 39 09/28/2022    EGFR 45 09/27/2022    CREATININE 1.15 09/29/2022    CREATININE 1.19 09/28/2022    CREATININE 1.06 09/27/2022   1/3/2024 cmp:  GLUCOSE 84 mg/dL 65-99  Final              BUN 22 mg/dL 7-25  Final             CREATININE 0.84 mg/dL 0.40-1.10  Final             SODIUM 142 mmol/L 135-145  Final             POTASSIUM 4.4 mmol/L 3.5-5.2  Final             CHLORIDE 104 mmol/L 100-109  Final             CARBON DIOXIDE 31 mmol/L 21-31  Final             CALCIUM 8.6 mg/dL 8.5-10.1  Final             ALKALINE PHOSPHATASE 41 U/L   Final             ALBUMIN 2.9 g/dL 3.5-5.7 L Final             BILIRUBIN,TOTAL 0.3 mg/dL 0.2-1.0  Final     Eltrombopag and its metabolites may interfere with this assay causing   erroneously high patient results.        PROTEIN, TOTAL 5.6 g/dL 6.3-8.3 L Final             AST 8 U/L <41   Final             ALT 5 U/L <56  Final             ANION GAP 7  3-11  Final             eGFRcr 64  >59     Stable  Avoid NSAIDS/nephrotoxins/IV contrast       Code status: DNR    Chief Complaint     Long term follow-up visit.    History of Present Illness     Pt seen and examined. Pt being fed by CNA. Pt medically stable.    The following portions of the patient's history were reviewed and updated as appropriate: allergies, current medications, past family history, past medical history, past social history, past surgical history and problem list.    Review of Systems     Review of Systems   Unable to perform ROS: Dementia     A comprehensive review of symptoms was obtained.  Pertinent positives and negatives noted in HPI.     Objective     Vitals: Reviewed in [employer] system. VSS    General: Awake, alert dementia (keeps eyes squinted shut but opens mouth to eat)  Head: atraumatic, normocephalic  Cardiovascular: RRR  Lungs: Clear to auscultation bilaterally  Abdomen: nontender/nondistended, +BS  Legs: no cyanosis, clubbing or edema  Feet: 2+ pedal pulse  Skin: clean, dry  Psych: calm, cooperative    Pertinent Laboratory/Diagnostic Studies:  Refer to facility chart.    Current Medications   Medications reviewed and updated in facility chart.    Kyra Canseco MD  Internal Medicine  Senior Care Physician

## 2024-09-29 PROBLEM — B96.89 BACTERIAL CONJUNCTIVITIS OF BOTH EYES: Status: RESOLVED | Noted: 2024-08-30 | Resolved: 2024-09-29

## 2024-09-29 PROBLEM — H10.9 BACTERIAL CONJUNCTIVITIS OF BOTH EYES: Status: RESOLVED | Noted: 2024-08-30 | Resolved: 2024-09-29

## 2024-10-01 ENCOUNTER — NURSING HOME VISIT (OUTPATIENT)
Dept: WOUND CARE | Facility: HOSPITAL | Age: 89
End: 2024-10-01
Payer: MEDICARE

## 2024-10-01 DIAGNOSIS — L89.153 PRESSURE INJURY OF SACRAL REGION, STAGE 3 (HCC): Primary | ICD-10-CM

## 2024-10-01 DIAGNOSIS — R26.2 AMBULATORY DYSFUNCTION: ICD-10-CM

## 2024-10-01 PROCEDURE — 99309 SBSQ NF CARE MODERATE MDM 30: CPT | Performed by: NURSE PRACTITIONER

## 2024-10-01 NOTE — PROGRESS NOTES
[facility] WOUND CARE MANAGEMENT   AND HYPERBARIC MEDICINE CENTER       Patient ID: Ebony Rivera is a 89 y.o. female Date of Birth 1/10/1928     Location of Service: [facility]    Performed wound round with: Wound team     Chief Complaint : Sacrum    Wound Instructions:  Wound: Sacrum  Discontinue previous wound order  Cleanse the wound bed with NSS   Apply non-sting skin prep to periwound area  Apply calcium alginate to wound bed, then cover with bordered foam  Frequency : Daily and prn for soiling  Offload all wounds  Turn and reposition frequently  Instruct / Assist with weight shifting in wheelchair  Increase protein intake.  Monitor for any sign of infection or worsening, inform PCP or patient's primary physician in your facility.      Allergies  Patient has no known allergies.      Assessment & Plan:  1. Pressure injury of sacral region, stage 3 ([facility])  Assessment & Plan:  Sacrum  Wound worsened, wound size is 2.5 x 3 cm, 100% granulation, noticeable malodor  Local wound care with calcium alginate  Recently seen by registered dietitian, patient lose 4% of her weight for the past 30 days.  BMI of 17.3, patient is underweight.  On house shake 3 times a day.  Had been consuming only 50 to 75% of her meals.  Needs to offload frequently, on air mattress  Provide timely continence care  Patient currently on pressure ulcer prevention protocol in the facility which includes turning and repositioning frequently, air mattress, timely continence care, protein supplement, and local wound care to prevent moisture on the sacrum and buttocks.  Despite of all the interventions, the wound still worsened due to poor medical condition, poor appetite, location of the wound, poor sensation, and immobility.  I will consider this wound as a unavoidable and can potentially worsened.  Facility nursing staff will closely monitor the wound for worsening and infection.  Follow-up next week  2. Ambulatory  dysfunction  Assessment & Plan:  On 24/7 restorative care           Subjective:   10/1/2024This is a 89 y.o., female referred to our service for wound/ skin alterations on sacrum.Patient have a complex medical history including but not limited to frailty syndrome, hypertension, and hyperlipidemia. Patient was referred by Senior Care Team. Patient was seen in collaboration with the facility wound team.     Wound History:   As per staff report, had not been eating and patient health is declining.  The wound was first discovered on September 26, 2024.  Started as a dermatitis and worsened.  Patient currently on pressure ulcer prevention protocol in the facility    Received patient in bed, nonverbal.  Dependent with turning and repositioning in bed.  Bedbound.  Poor appetite, consumed mostly 25 to 50% of her meals.  Currently on house shake 3 times a day patient is underweight, BMI of 17.3, 4% weight loss for past 30 days                Review of Systems   Constitutional: Negative.    Respiratory: Negative.     Cardiovascular: Negative.    Skin:  Positive for wound.       Objective:    Physical Exam  Constitutional:       Appearance: She is ill-appearing.   Cardiovascular:      Rate and Rhythm: Normal rate.   Pulmonary:      Effort: Pulmonary effort is normal.   Genitourinary:     Comments: Incontinent  Musculoskeletal:      Comments: Total dependent with positioning in bed   Skin:     Findings: Lesion present.      Comments: Sacrum: Wound size is 2.5 x 3 x 0.1 cm.,  100% granulation, moderate amount of serous drainage, positive malodor, no redness, denies pain   Neurological:      Mental Status: She is alert.              Procedures           Patient's care was coordinated with nursing facility staff. Recent vitals, labs and updated medications were reviewed on EMR or chart system of facility. Past Medical, surgical, social, medication and allergy history and patient's previous records were reviewed and updated as  appropriate: Most up-to date information is available in the facility EMR where the patient is currently admitted.    Patient Active Problem List   Diagnosis    HLD (hyperlipidemia)    Hypothyroidism    CKD (chronic kidney disease)    Benign hypertension with stage 3a chronic kidney disease (HCC)    Iron deficiency anemia    Bilateral lower extremity edema    History of 2019 novel coronavirus disease (COVID-19)    Excoriated rash    Fall at nursing home    Frailty syndrome in geriatric patient    DNR (do not resuscitate)    Mild mitral regurgitation by prior echocardiogram    Tricuspid regurgitation    Midline back pain    Dementia (HCC)    Drug-induced constipation    ACC/AHA stage B diastolic heart failure (HCC)    Xeroderma    Preventative health care    Pressure injury of sacral region, stage 3 (HCC)    Ambulatory dysfunction     Past Medical History:   Diagnosis Date    Acute encephalopathy 09/24/2022    Ambulatory dysfunction 10/10/2022    Aneurysm (HCC)     brain    CKD (chronic kidney disease)     Fall at nursing home 11/08/2022    Frailty syndrome in geriatric patient 11/13/2022    HLD (hyperlipidemia)     HTN (hypertension)     Hypothyroidism     Impaired cognition 02/27/2023    Midline back pain 02/25/2023     History reviewed. No pertinent surgical history.  Social History     Socioeconomic History    Marital status: Single     Spouse name: None    Number of children: None    Years of education: None    Highest education level: None   Occupational History    None   Tobacco Use    Smoking status: Never    Smokeless tobacco: Never   Vaping Use    Vaping status: Never Used   Substance and Sexual Activity    Alcohol use: Never    Drug use: Never    Sexual activity: None   Other Topics Concern    None   Social History Narrative    None     Social Determinants of Health     Financial Resource Strain: Not on file   Food Insecurity: Not on file   Transportation Needs: No Transportation Needs (9/26/2022)     "PRAPARE - Transportation     Lack of Transportation (Medical): No     Lack of Transportation (Non-Medical): No   Physical Activity: Not on file   Stress: Not on file   Social Connections: Not on file   Intimate Partner Violence: Not on file   Housing Stability: Not on file        Current Outpatient Medications:     acetaminophen (TYLENOL) 325 mg tablet, Take 650 mg by mouth 3 (three) times a day, Disp: , Rfl:     docusate sodium (COLACE) 50 mg capsule, Take 50 mg by mouth 2 (two) times a day, Disp: , Rfl:     ferrous sulfate 324 MG TBEC, Take 324 mg by mouth daily with breakfast, Disp: , Rfl:     levothyroxine 25 mcg tablet, Take 25 mcg by mouth daily, Disp: , Rfl:     Lidocaine 4 % PTCH, Apply 1 patch topically daily, Disp: , Rfl:     Menthol, Topical Analgesic, 4 % GEL, Apply topically, Disp: , Rfl:     multivitamin-minerals therapeutic (THERA-M), Take 1 tablet by mouth daily, Disp: , Rfl:     senna-docusate sodium (SENOKOT S) 8.6-50 mg per tablet, Take 2 tablets by mouth daily at bedtime, Disp: , Rfl:     simvastatin (ZOCOR) 20 mg tablet, Take 20 mg by mouth daily, Disp: , Rfl:   History reviewed. No pertinent family history.           Coordination of Care: Wound team aware of the treatment plan. Facility nurse will provide wound treatment and monitor the wound for any changes.     Patient / Staff education : Patient / Staff was given education on sign of infection and pressure ulcer prevention. Patient/ Staff verbalized understanding     Follow up :  Next week    Voice-recognition software may have been used in the preparation of this document. Occasional wrong word or \"sound-alike\" substitutions may have occurred due to the inherent limitations of voice recognition software. Interpretation should be guided by context.      SARAH Colon  "

## 2024-10-01 NOTE — ASSESSMENT & PLAN NOTE
Sacrum  Wound worsened, wound size is 2.5 x 3 cm, 100% granulation, noticeable malodor  Local wound care with calcium alginate  Recently seen by registered dietitian, patient lose 4% of her weight for the past 30 days.  BMI of 17.3, patient is underweight.  On house shake 3 times a day.  Had been consuming only 50 to 75% of her meals.  Needs to offload frequently, on air mattress  Provide timely continence care  Patient currently on pressure ulcer prevention protocol in the facility which includes turning and repositioning frequently, air mattress, timely continence care, protein supplement, and local wound care to prevent moisture on the sacrum and buttocks.  Despite of all the interventions, the wound still worsened due to poor medical condition, poor appetite, location of the wound, poor sensation, and immobility.  I will consider this wound as a unavoidable and can potentially worsened.  Facility nursing staff will closely monitor the wound for worsening and infection.  Follow-up next week

## 2024-10-07 NOTE — ASSESSMENT & PLAN NOTE
Assist with adls/iadls  Cont 24/7 longterm supportive care  Avoid deliriogenic agents  Meal intake: usually 25%; at times 75%  Weight: pt with 4 pound weight loss  9/10/2024 11:22 104.0 Lbs Mechanical lift scale      8/9/2024 08:11 108.0 Lbs     BM: almost daily  Cont house shakes TID  Redirect, reorient and provide distraction techniques

## 2024-10-07 NOTE — ASSESSMENT & PLAN NOTE
Lab Results   Component Value Date    EGFR 40 09/29/2022    EGFR 39 09/28/2022    EGFR 45 09/27/2022    CREATININE 1.15 09/29/2022    CREATININE 1.19 09/28/2022    CREATININE 1.06 09/27/2022   1/3/2024 cmp:  GLUCOSE 84 mg/dL 65-99  Final              BUN 22 mg/dL 7-25  Final             CREATININE 0.84 mg/dL 0.40-1.10  Final             SODIUM 142 mmol/L 135-145  Final             POTASSIUM 4.4 mmol/L 3.5-5.2  Final             CHLORIDE 104 mmol/L 100-109  Final             CARBON DIOXIDE 31 mmol/L 21-31  Final             CALCIUM 8.6 mg/dL 8.5-10.1  Final             ALKALINE PHOSPHATASE 41 U/L   Final             ALBUMIN 2.9 g/dL 3.5-5.7 L Final             BILIRUBIN,TOTAL 0.3 mg/dL 0.2-1.0  Final     Eltrombopag and its metabolites may interfere with this assay causing   erroneously high patient results.        PROTEIN, TOTAL 5.6 g/dL 6.3-8.3 L Final             AST 8 U/L <41  Final             ALT 5 U/L <56  Final             ANION GAP 7  3-11  Final             eGFRcr 64  >59     Stable  Avoid NSAIDS/nephrotoxins/IV contrast

## 2024-10-08 ENCOUNTER — NURSING HOME VISIT (OUTPATIENT)
Dept: WOUND CARE | Facility: HOSPITAL | Age: 89
End: 2024-10-08
Payer: MEDICARE

## 2024-10-08 DIAGNOSIS — L89.153 PRESSURE INJURY OF SACRAL REGION, STAGE 3 (HCC): Primary | ICD-10-CM

## 2024-10-08 PROCEDURE — 99308 SBSQ NF CARE LOW MDM 20: CPT | Performed by: NURSE PRACTITIONER

## 2024-10-08 NOTE — ASSESSMENT & PLAN NOTE
Sacrum  Wound improved, decreasing wound size, decreasing maceration, with no obvious sign of infection  Local wound care -change to Triad paste and ABD pad  Recently seen by registered dietitian, patient lose 4% of her weight for the past 30 days.  BMI of 17.3, patient is underweight.  On house shake 3 times a day.  Had been consuming only 50 to 75% of her meals.  Needs to offload frequently, on air mattress  Provide timely continence care  Patient currently on pressure ulcer prevention protocol in the facility which includes turning and repositioning frequently, air mattress, timely continence care, protein supplement, and local wound care to prevent moisture on the sacrum and buttocks.  Despite of all the interventions, the wound still worsened due to poor medical condition, poor appetite, location of the wound, poor sensation, and immobility.  I will consider this wound as a unavoidable and can potentially worsened.  Facility nursing staff will closely monitor the wound for worsening and infection.  Follow-up next week

## 2024-10-08 NOTE — PROGRESS NOTES
[facility] WOUND CARE MANAGEMENT   AND HYPERBARIC MEDICINE CENTER       Patient ID: Ebony Rivera is a 89 y.o. female Date of Birth 1/10/1928     Location of Service: [facility]    Performed wound round with: Wound team     Chief Complaint : Sacrum    Wound Instructions:  Wound: Sacrum  Discontinue previous wound order  Cleanse the wound bed with NSS   Apply non-sting skin prep to periwound area  Apply Triad paste to wound bed, then cover with ABD pad  Frequency : Daily and prn for soiling  Offload all wounds  Turn and reposition frequently  Instruct / Assist with weight shifting in wheelchair  Increase protein intake.  Monitor for any sign of infection or worsening, inform PCP or patient's primary physician in your facility.      Allergies  Patient has no known allergies.      Assessment & Plan:  1. Pressure injury of sacral region, stage 3 ([facility])  Assessment & Plan:  Sacrum  Wound improved, decreasing wound size, decreasing maceration, with no obvious sign of infection  Local wound care -change to Triad paste and ABD pad  Recently seen by registered dietitian, patient lose 4% of her weight for the past 30 days.  BMI of 17.3, patient is underweight.  On house shake 3 times a day.  Had been consuming only 50 to 75% of her meals.  Needs to offload frequently, on air mattress  Provide timely continence care  Patient currently on pressure ulcer prevention protocol in the facility which includes turning and repositioning frequently, air mattress, timely continence care, protein supplement, and local wound care to prevent moisture on the sacrum and buttocks.  Despite of all the interventions, the wound still worsened due to poor medical condition, poor appetite, location of the wound, poor sensation, and immobility.  I will consider this wound as a unavoidable and can potentially worsened.  Facility nursing staff will closely monitor the wound for worsening and infection.  Follow-up next week              Subjective:   10/1/2024This is a 89 y.o., female referred to our service for wound/ skin alterations on sacrum.Patient have a complex medical history including but not limited to frailty syndrome, hypertension, and hyperlipidemia. Patient was referred by Senior Care Team. Patient was seen in collaboration with the facility wound team.     Wound History:   As per staff report, had not been eating and patient health is declining.  The wound was first discovered on September 26, 2024.  Started as a dermatitis and worsened.  Patient currently on pressure ulcer prevention protocol in the facility    Received patient in bed, nonverbal.  Dependent with turning and repositioning in bed.  Bedbound.  Poor appetite, consumed mostly 25 to 50% of her meals.  Currently on house shake 3 times a day patient is underweight, BMI of 17.3, 4% weight loss for past 30 days    10/8/2024 Follow up for wound on the sacrum . Received patient, not in distress. Facility staff did not report any significant issues related to the wound.  As per report, patient still had a poor appetite.                  Review of Systems   Constitutional: Negative.    Respiratory: Negative.     Cardiovascular: Negative.    Skin:  Positive for wound.       Objective:    Physical Exam  Constitutional:       Appearance: She is ill-appearing.   Cardiovascular:      Rate and Rhythm: Normal rate.   Pulmonary:      Effort: Pulmonary effort is normal.   Genitourinary:     Comments: Incontinent  Musculoskeletal:      Comments: Total dependent with positioning in bed   Skin:     Findings: Lesion present.      Comments: Sacrum: Wound size is 2.5 x 2 x 0.1 cm.,  100% granulation, moderate amount of serous drainage, no malodor, no redness, denies pain   Neurological:      Mental Status: She is alert.              Procedures           Patient's care was coordinated with nursing facility staff. Recent vitals, labs and updated medications were reviewed on EMR or chart  system of facility. Past Medical, surgical, social, medication and allergy history and patient's previous records were reviewed and updated as appropriate: Most up-to date information is available in the facility EMR where the patient is currently admitted.    Patient Active Problem List   Diagnosis    HLD (hyperlipidemia)    Hypothyroidism    CKD (chronic kidney disease)    Benign hypertension with stage 3a chronic kidney disease (HCC)    Iron deficiency anemia    Bilateral lower extremity edema    History of 2019 novel coronavirus disease (COVID-19)    Excoriated rash    Fall at nursing home    Frailty syndrome in geriatric patient    DNR (do not resuscitate)    Mild mitral regurgitation by prior echocardiogram    Tricuspid regurgitation    Midline back pain    Dementia (HCC)    Drug-induced constipation    ACC/AHA stage B diastolic heart failure (HCC)    Xeroderma    Preventative health care    Pressure injury of sacral region, stage 3 (HCC)    Ambulatory dysfunction     Past Medical History:   Diagnosis Date    Acute encephalopathy 09/24/2022    Ambulatory dysfunction 10/10/2022    Aneurysm (HCC)     brain    CKD (chronic kidney disease)     Fall at nursing home 11/08/2022    Frailty syndrome in geriatric patient 11/13/2022    HLD (hyperlipidemia)     HTN (hypertension)     Hypothyroidism     Impaired cognition 02/27/2023    Midline back pain 02/25/2023     History reviewed. No pertinent surgical history.  Social History     Socioeconomic History    Marital status: Single     Spouse name: None    Number of children: None    Years of education: None    Highest education level: None   Occupational History    None   Tobacco Use    Smoking status: Never    Smokeless tobacco: Never   Vaping Use    Vaping status: Never Used   Substance and Sexual Activity    Alcohol use: Never    Drug use: Never    Sexual activity: None   Other Topics Concern    None   Social History Narrative    None     Social Determinants of Health  "    Financial Resource Strain: Not on file   Food Insecurity: Not on file   Transportation Needs: No Transportation Needs (9/26/2022)    PRAPARE - Transportation     Lack of Transportation (Medical): No     Lack of Transportation (Non-Medical): No   Physical Activity: Not on file   Stress: Not on file   Social Connections: Not on file   Intimate Partner Violence: Not on file   Housing Stability: Not on file        Current Outpatient Medications:     acetaminophen (TYLENOL) 325 mg tablet, Take 650 mg by mouth 3 (three) times a day, Disp: , Rfl:     docusate sodium (COLACE) 50 mg capsule, Take 50 mg by mouth 2 (two) times a day, Disp: , Rfl:     ferrous sulfate 324 MG TBEC, Take 324 mg by mouth daily with breakfast, Disp: , Rfl:     levothyroxine 25 mcg tablet, Take 25 mcg by mouth daily, Disp: , Rfl:     Lidocaine 4 % PTCH, Apply 1 patch topically daily, Disp: , Rfl:     Menthol, Topical Analgesic, 4 % GEL, Apply topically, Disp: , Rfl:     multivitamin-minerals therapeutic (THERA-M), Take 1 tablet by mouth daily, Disp: , Rfl:     senna-docusate sodium (SENOKOT S) 8.6-50 mg per tablet, Take 2 tablets by mouth daily at bedtime, Disp: , Rfl:     simvastatin (ZOCOR) 20 mg tablet, Take 20 mg by mouth daily, Disp: , Rfl:   History reviewed. No pertinent family history.           Coordination of Care: Wound team aware of the treatment plan. Facility nurse will provide wound treatment and monitor the wound for any changes.     Patient / Staff education : Patient / Staff was given education on sign of infection and pressure ulcer prevention. Patient/ Staff verbalized understanding     Follow up :  Next week    Voice-recognition software may have been used in the preparation of this document. Occasional wrong word or \"sound-alike\" substitutions may have occurred due to the inherent limitations of voice recognition software. Interpretation should be guided by context.      SARAH Colon  "

## 2024-10-15 ENCOUNTER — NURSING HOME VISIT (OUTPATIENT)
Dept: WOUND CARE | Facility: HOSPITAL | Age: 89
End: 2024-10-15
Payer: MEDICARE

## 2024-10-15 DIAGNOSIS — L89.153 PRESSURE INJURY OF SACRAL REGION, STAGE 3 (HCC): Primary | ICD-10-CM

## 2024-10-15 PROCEDURE — 99308 SBSQ NF CARE LOW MDM 20: CPT | Performed by: NURSE PRACTITIONER

## 2024-10-15 NOTE — PROGRESS NOTES
[facility] WOUND CARE MANAGEMENT   AND HYPERBARIC MEDICINE CENTER       Patient ID: Ebony Rivera is a 89 y.o. female Date of Birth 1/10/1928     Location of Service: [facility]    Performed wound round with: Wound team     Chief Complaint : Sacrum    Wound Instructions:  Wound: Sacrum  Discontinue previous wound order  Cleanse the wound bed with NSS   Apply non-sting skin prep to periwound area  Apply Triad paste to wound bed, then cover with ABD pad  Frequency : Daily and prn for soiling  Offload all wounds  Turn and reposition frequently  Instruct / Assist with weight shifting in wheelchair  Increase protein intake.  Monitor for any sign of infection or worsening, inform PCP or patient's primary physician in your facility.      Allergies  Patient has no known allergies.      Assessment & Plan:  1. Pressure injury of sacral region, stage 3 ([facility])  Assessment & Plan:  Sacrum  Wound improved, decreasing wound size compared to last week, with no obvious sign infection  Local wound care -change to Triad paste and ABD pad  Recently seen by registered dietitian, patient lose 4% of her weight for the past 30 days.  BMI of 17.3, patient is underweight.  On house shake 3 times a day.  Had been consuming only 50 to 75% of her meals.  Needs to offload frequently, on air mattress  Provide timely continence care  Patient currently on pressure ulcer prevention protocol in the facility which includes turning and repositioning frequently, air mattress, timely continence care, protein supplement, and local wound care to prevent moisture on the sacrum and buttocks.  Despite of all the interventions, the wound still worsened due to poor medical condition, poor appetite, location of the wound, poor sensation, and immobility.  I will consider this wound as a unavoidable and can potentially worsened.  Facility nursing staff will closely monitor the wound for worsening and infection.  Follow-up in 2 weeks                Subjective:   10/1/2024This is a 89 y.o., female referred to our service for wound/ skin alterations on sacrum.Patient have a complex medical history including but not limited to frailty syndrome, hypertension, and hyperlipidemia. Patient was referred by Senior Care Team. Patient was seen in collaboration with the facility wound team.     Wound History:   As per staff report, had not been eating and patient health is declining.  The wound was first discovered on September 26, 2024.  Started as a dermatitis and worsened.  Patient currently on pressure ulcer prevention protocol in the facility    Received patient in bed, nonverbal.  Dependent with turning and repositioning in bed.  Bedbound.  Poor appetite, consumed mostly 25 to 50% of her meals.  Currently on house shake 3 times a day patient is underweight, BMI of 17.3, 4% weight loss for past 30 days    10/8/2024 Follow up for wound on the sacrum . Received patient, not in distress. Facility staff did not report any significant issues related to the wound.  As per report, patient still had a poor appetite.    October 15, 2024.  Follow-up for wound of the sacrum.  Received patient in bed, seems comfortable.  Patient still have poor appetite.                  Review of Systems   Constitutional: Negative.    Respiratory: Negative.     Cardiovascular: Negative.    Skin:  Positive for wound.       Objective:    Physical Exam  Constitutional:       Appearance: She is ill-appearing.   Cardiovascular:      Rate and Rhythm: Normal rate.   Pulmonary:      Effort: Pulmonary effort is normal.   Genitourinary:     Comments: Incontinent  Musculoskeletal:      Comments: Total dependent with positioning in bed   Skin:     Findings: Lesion present.      Comments: Sacrum: Wound size is 2 x 1 x 0.1 cm.,  100% granulation, moderate amount of serous drainage, no malodor, no redness, denies pain   Neurological:      Mental Status: She is alert.              Procedures            Patient's care was coordinated with nursing facility staff. Recent vitals, labs and updated medications were reviewed on EMR or chart system of facility. Past Medical, surgical, social, medication and allergy history and patient's previous records were reviewed and updated as appropriate: Most up-to date information is available in the facility EMR where the patient is currently admitted.    Patient Active Problem List   Diagnosis    HLD (hyperlipidemia)    Hypothyroidism    CKD (chronic kidney disease)    Benign hypertension with stage 3a chronic kidney disease (HCC)    Iron deficiency anemia    Bilateral lower extremity edema    History of 2019 novel coronavirus disease (COVID-19)    Excoriated rash    Fall at nursing home    Frailty syndrome in geriatric patient    DNR (do not resuscitate)    Mild mitral regurgitation by prior echocardiogram    Tricuspid regurgitation    Midline back pain    Dementia (HCC)    Drug-induced constipation    ACC/AHA stage B diastolic heart failure (HCC)    Xeroderma    Preventative health care    Pressure injury of sacral region, stage 3 (HCC)    Ambulatory dysfunction     Past Medical History:   Diagnosis Date    Acute encephalopathy 09/24/2022    Ambulatory dysfunction 10/10/2022    Aneurysm (HCC)     brain    CKD (chronic kidney disease)     Fall at nursing home 11/08/2022    Frailty syndrome in geriatric patient 11/13/2022    HLD (hyperlipidemia)     HTN (hypertension)     Hypothyroidism     Impaired cognition 02/27/2023    Midline back pain 02/25/2023     History reviewed. No pertinent surgical history.  Social History     Socioeconomic History    Marital status: Single     Spouse name: None    Number of children: None    Years of education: None    Highest education level: None   Occupational History    None   Tobacco Use    Smoking status: Never    Smokeless tobacco: Never   Vaping Use    Vaping status: Never Used   Substance and Sexual Activity    Alcohol use: Never     "Drug use: Never    Sexual activity: None   Other Topics Concern    None   Social History Narrative    None     Social Determinants of Health     Financial Resource Strain: Not on file   Food Insecurity: Not on file   Transportation Needs: No Transportation Needs (9/26/2022)    PRAPARE - Transportation     Lack of Transportation (Medical): No     Lack of Transportation (Non-Medical): No   Physical Activity: Not on file   Stress: Not on file   Social Connections: Not on file   Intimate Partner Violence: Not on file   Housing Stability: Not on file        Current Outpatient Medications:     acetaminophen (TYLENOL) 325 mg tablet, Take 650 mg by mouth 3 (three) times a day, Disp: , Rfl:     docusate sodium (COLACE) 50 mg capsule, Take 50 mg by mouth 2 (two) times a day, Disp: , Rfl:     ferrous sulfate 324 MG TBEC, Take 324 mg by mouth daily with breakfast, Disp: , Rfl:     levothyroxine 25 mcg tablet, Take 25 mcg by mouth daily, Disp: , Rfl:     Lidocaine 4 % PTCH, Apply 1 patch topically daily, Disp: , Rfl:     Menthol, Topical Analgesic, 4 % GEL, Apply topically, Disp: , Rfl:     multivitamin-minerals therapeutic (THERA-M), Take 1 tablet by mouth daily, Disp: , Rfl:     senna-docusate sodium (SENOKOT S) 8.6-50 mg per tablet, Take 2 tablets by mouth daily at bedtime, Disp: , Rfl:     simvastatin (ZOCOR) 20 mg tablet, Take 20 mg by mouth daily, Disp: , Rfl:   History reviewed. No pertinent family history.           Coordination of Care: Wound team aware of the treatment plan. Facility nurse will provide wound treatment and monitor the wound for any changes.     Patient / Staff education : Patient / Staff was given education on sign of infection and pressure ulcer prevention. Patient/ Staff verbalized understanding     Follow up :  2 weeks    Voice-recognition software may have been used in the preparation of this document. Occasional wrong word or \"sound-alike\" substitutions may have occurred due to the inherent " limitations of voice recognition software. Interpretation should be guided by context.      SARAH Colon

## 2024-10-15 NOTE — ASSESSMENT & PLAN NOTE
Sacrum  Wound improved, decreasing wound size compared to last week, with no obvious sign infection  Local wound care -change to Triad paste and ABD pad  Recently seen by registered dietitian, patient lose 4% of her weight for the past 30 days.  BMI of 17.3, patient is underweight.  On house shake 3 times a day.  Had been consuming only 50 to 75% of her meals.  Needs to offload frequently, on air mattress  Provide timely continence care  Patient currently on pressure ulcer prevention protocol in the facility which includes turning and repositioning frequently, air mattress, timely continence care, protein supplement, and local wound care to prevent moisture on the sacrum and buttocks.  Despite of all the interventions, the wound still worsened due to poor medical condition, poor appetite, location of the wound, poor sensation, and immobility.  I will consider this wound as a unavoidable and can potentially worsened.  Facility nursing staff will closely monitor the wound for worsening and infection.  Follow-up in 2 weeks

## 2024-10-28 ENCOUNTER — NURSING HOME VISIT (OUTPATIENT)
Dept: GERIATRICS | Facility: OTHER | Age: 89
End: 2024-10-28
Payer: MEDICARE

## 2024-10-28 DIAGNOSIS — Z00.00 PREVENTATIVE HEALTH CARE: ICD-10-CM

## 2024-10-28 DIAGNOSIS — E03.9 HYPOTHYROIDISM, UNSPECIFIED TYPE: ICD-10-CM

## 2024-10-28 DIAGNOSIS — E78.5 HYPERLIPIDEMIA, UNSPECIFIED HYPERLIPIDEMIA TYPE: ICD-10-CM

## 2024-10-28 DIAGNOSIS — E44.0 MODERATE PROTEIN MALNUTRITION (HCC): Primary | ICD-10-CM

## 2024-10-28 PROCEDURE — 99309 SBSQ NF CARE MODERATE MDM 30: CPT | Performed by: INTERNAL MEDICINE

## 2024-10-28 NOTE — PROGRESS NOTES
[facility]  [address] [place] [address]  [phone]  [facility]  [id number]      NAME: Ebony Rivera  AGE: 89 y.o. SEX: female [phone]    DATE OF ENCOUNTER: 10/28/2024    Assessment and Plan     1. Moderate protein malnutrition (HCC)        2. Preventative health care        3. Hyperlipidemia, unspecified hyperlipidemia type        4. Hypothyroidism, unspecified type           Moderate protein malnutrition (HCC)  Pt with hx of moderate protein malnutrition  Pt needs to be fed all meals  Noted CNA at bedside  Pt on EBC diet (every bite counts)  Meal intake: fair; pt usually eats 50% of meals; at times 75%  Weight: pt with almost 4 pound weight loss in 1 month  10/12/2024 14:14 100.3 Lbs Mechanical lift scale      9/10/2024 11:22 104.0 Lbs     Cont house shakes TID    Preventative health care  COVID-19 vaccine 11/17/2023  Influenza vaccine 09/30/2024    HLD (hyperlipidemia)  Stable  Cont simvastatin 20 mg 1 tab po QHS    Hypothyroidism  Stable  Cont levothyroxine 25 mcg 1 tab po qam       Code status: DNR    Chief Complaint     Long term follow-up visit. Monthly visit    History of Present Illness   Pt seen and examined. Pt squints eyes closed. Pt eating breakfast for CNA. Pt medically stable.    BM: daily    The following portions of the patient's history were reviewed and updated as appropriate: allergies, current medications, past family history, past medical history, past social history, past surgical history and problem list.    Review of Systems     Review of Systems   Unable to perform ROS: Dementia     A comprehensive review of symptoms was obtained.  Pertinent positives and negatives noted in HPI.     Objective     [facility]    General: Awake, alert dementia  Head: atraumatic, normocephalic  Cardiovascular: RRR  Lungs: Clear to auscultation bilaterally  Abdomen: nontender/nondistended, +BS  Legs: no cyanosis, clubbing or  edema  Feet: clean, dry, intact  Skin: clean, dry  Psych: calm, cooperative    Pertinent Laboratory/Diagnostic Studies:  Refer to facility chart.    Current Medications   Medications reviewed and updated in facility chart.    Tylenol 325mg (pain) Give 2 tablet by mouth every 4 hours as needed for Mild Pain   Tylenol 325mg (fever) Give 2 tablet by mouth every 4 hours as needed for fever Administer 2 tablets (650MG) by mouth every 4 hours as needed for increased temperature. Document temperature (Max 3GM/24HR)   LEVOTHYROXIN TAB 25MCG 1 TAB BY MOUTH ONCE DAILY FOR THYROID(Indications for Use: hypothyroid)   BENADRYL ITCH STOP 1-0.1% APPLY TOPICALLY TO AFFECTED AREA OF CHEST AND NECK EVERY 6 HOURS AS NEEDED FOR ITCH/RASH   ACETAMINOPHEN 325MG TABS 2 TABS (650MG) BY MOUTH THREE TIMES DAILY @0900/1400/2100; *MAX 3 GM ACETAMINOPHEN/24HRS*(Indications for Use: Pain)   DiphenhydrAMINE HCL CAPS 25MG 1 CAP BY MOUTH EVERY 6 HOURS AS NEEDED FOR HIVES/ ITCHING(Indications for Use: hives/itching)   THERA-TABS M TAB 1 TAB BY MOUTH ONCE DAILY FOR SUPPLEMENT   SIMVASTATIN TAB 20MG 1 TAB BY MOUTH AT BEDTIME FOR HLD(Related Diagnoses: HYPERLIPIDEMIA, UNSPECIFIED (E78.5))   SENNA 8.6MG TAB 2 TABS BY MOUTH AT BEDTIME(Indications for Use: Constipation)   POLYETH GLYC POW 3350 NF MEASURE 17GMS IN DOSING CAP, MIX WITH 4OZ OF SUITABLE LIQUID AND ADMINISTER BY MOUTH ONCE DAILY AS NEEDED FOR CONSTIPATION   Sorbitol Solution 70 % Give 30 ml by mouth as needed for constipation administer every 3 days prn   Fleet Mineral Oil Enema Insert 1 unit rectally as needed for constipation Q 3rd day PRN   EYE ALLERGY SOL ITCH/RED 1 DROP IN BOTH EYES ONCE DAILY **ALLOW A 5 MINUTE PERIOD BEFORE INSTILLING ADDITIONAL OPHTHALMIC MEDICATIONS*(Indications for Use: Dry eye)   REFRESH TEARS 0.5% DROP INSTILL 1 DROP IN BOTH EYES THREE TIMES DAILY ONGOING **ALLOW A 5 MINUTE PERIOD BEFORE INSTILLING ADDITIONAL OPHTHALMIC MEDICATIONS*(Indications for Use: Dry eye)  "  GENTEAL TEAR OIN NT-TIME INSTILL INTO LEFT EYE EVERY 12 HOURS FOR DRY EYE **ALLOW A 5 MINUTE PERIOD BEFORE INSTILLING ADDITIONAL OPHTHALMIC MEDICATIONS**   POLYETHYLENE GLYCOL 3350 MEASURE 17GMS IN DOSING CAP, MIX WITH SUITABLE LIQUID AND ADMINISTER BY MOUTH ONCE DAILY AS NEEDED FOR CONSTIPATION   FERROUS SULFATE 324MG TBEC 1 TAB BY MOUTH EVERY EVENING FOR ANEMIA TAKE WITH BREAKFA   Addendum:  Per PCC RN note:  \"  10/22/2024 08:02 Health Status Note   Note Text: Spoke with resident's niece, Korin, on 10/20 regarding resident's weight loss, intakes and current condition. Discussed goals and plan of care. Reviewed options for hospice. Korin will speak to her sister regarding same. At this time, she does not wish toconsult hospice. She would like to continue with goals of comfort for resident.   \"    Kyra Canseco MD  Internal Medicine  Senior Care Physician   "

## 2024-10-31 PROBLEM — E44.0 MODERATE PROTEIN MALNUTRITION (HCC): Status: ACTIVE | Noted: 2022-10-07

## 2024-10-31 NOTE — ASSESSMENT & PLAN NOTE
Pt with hx of moderate protein malnutrition  Pt needs to be fed all meals  Noted CNA at bedside  Pt on EBC diet (every bite counts)  Meal intake: fair; pt usually eats 50% of meals; at times 75%  Weight: pt with almost 4 pound weight loss in 1 month  10/12/2024 14:14 100.3 Lbs Mechanical lift scale      9/10/2024 11:22 104.0 Lbs     Cont house shakes TID

## 2024-11-05 ENCOUNTER — NURSING HOME VISIT (OUTPATIENT)
Dept: WOUND CARE | Facility: HOSPITAL | Age: 89
End: 2024-11-05
Payer: MEDICARE

## 2024-11-05 DIAGNOSIS — L89.150 PRESSURE INJURY OF SACRAL REGION, UNSTAGEABLE (HCC): Primary | ICD-10-CM

## 2024-11-05 DIAGNOSIS — E44.0 MODERATE PROTEIN MALNUTRITION (HCC): ICD-10-CM

## 2024-11-05 DIAGNOSIS — L89.95 PRESSURE INJURY, UNSTAGEABLE, UNSPECIFIED LOCATION (HCC): ICD-10-CM

## 2024-11-05 PROCEDURE — 99309 SBSQ NF CARE MODERATE MDM 30: CPT | Performed by: NURSE PRACTITIONER

## 2024-11-05 NOTE — ASSESSMENT & PLAN NOTE
Sacrum  Wound worsened, 100% slough, deepest tissue not visible  Local wound care  -continue Triad paste and ABD pad  Recently seen by registered dietitian, patient lose 4% of her weight for the past 30 days.  BMI of 17.3, patient is underweight.  On house shake 3 times a day.  Had been consuming only 50 to 75% of her meals.  Needs to offload frequently, on air mattress  Provide timely continence care  Patient currently on pressure ulcer prevention protocol in the facility which includes turning and repositioning frequently, air mattress, timely continence care, protein supplement, and local wound care to prevent moisture on the sacrum and buttocks.  Despite of all the interventions, the wound still worsened due to poor medical condition, poor appetite, location of the wound, poor sensation, and immobility.  I will consider this wound as a unavoidable and can potentially worsened.  Facility nursing staff will closely monitor the wound for worsening and infection.  Follow-up next week

## 2024-11-05 NOTE — ASSESSMENT & PLAN NOTE
Thoracic spine  Wound is covered with 100% necrotic tissue, with erythema on the surrounding tissue  Local wound care with Triad paste and bordered foam  Patient have a poor appetite, on protein supplement  This is considered as a unavoidable pressure ulcer.  Patient currently on pressure ulcer prevention protocol in the facility however, despite of all the interventions, patient still develop pressure ulcer  I will provide update to Senior care physician, will recommend hospice evaluation.  Update provided via Epic chat.  Ordered CBCD  Senior care physician ordered Keflex and probiotic.Unit manager was made aware. Unit manager will also discuss hospice recommendation as per physician request.   Follow-up next week to closely monitor the wound

## 2024-11-05 NOTE — PROGRESS NOTES
[facility] WOUND CARE MANAGEMENT   AND HYPERBARIC MEDICINE CENTER       Patient ID: Ebony Rivera is a 89 y.o. female Date of Birth 1/10/1928     Location of Service: [facility]    Performed wound round with: Wound team     Chief Complaint : Sacrum    Wound Instructions:  Wound: Sacrum and upper back  Discontinue previous wound order  Cleanse the wound bed with NSS   Apply non-sting skin prep to periwound area  Apply Triad paste to wound bed, then cover with ABD pad or bordered foam  Frequency : Daily and prn for soiling  Order CBCD  Offload all wounds  Turn and reposition frequently  Instruct / Assist with weight shifting in wheelchair  Increase protein intake.  Monitor for any sign of infection or worsening, inform PCP or patient's primary physician in your facility.      Allergies  Patient has no known allergies.      Assessment & Plan:  1. Pressure injury of sacral region, unstageable (HCC)  Assessment & Plan:  Sacrum  Wound worsened, 100% slough, deepest tissue not visible  Local wound care  -continue Triad paste and ABD pad  Recently seen by registered dietitian, patient lose 4% of her weight for the past 30 days.  BMI of 17.3, patient is underweight.  On house shake 3 times a day.  Had been consuming only 50 to 75% of her meals.  Needs to offload frequently, on air mattress  Provide timely continence care  Patient currently on pressure ulcer prevention protocol in the facility which includes turning and repositioning frequently, air mattress, timely continence care, protein supplement, and local wound care to prevent moisture on the sacrum and buttocks.  Despite of all the interventions, the wound still worsened due to poor medical condition, poor appetite, location of the wound, poor sensation, and immobility.  I will consider this wound as a unavoidable and can potentially worsened.  Facility nursing staff will closely monitor the wound for worsening and infection.  Follow-up next week  2.  Pressure injury, unstageable, unspecified location (HCC)  Assessment & Plan:  Thoracic spine  Wound is covered with 100% necrotic tissue, with erythema on the surrounding tissue  Local wound care with Triad paste and bordered foam  Patient have a poor appetite, on protein supplement  This is considered as a unavoidable pressure ulcer.  Patient currently on pressure ulcer prevention protocol in the facility however, despite of all the interventions, patient still develop pressure ulcer  I will provide update to Senior care physician, will recommend hospice evaluation.  Update provided via Epic chat.  Ordered CBCD  Senior care physician ordered Keflex and probiotic.Unit manager was made aware. Unit manager will also discuss hospice recommendation as per physician request.   Follow-up next week to closely monitor the wound  3. Moderate protein malnutrition (HCC)                 Subjective:   10/1/2024This is a 89 y.o., female referred to our service for wound/ skin alterations on sacrum.Patient have a complex medical history including but not limited to frailty syndrome, hypertension, and hyperlipidemia. Patient was referred by Senior Care Team. Patient was seen in collaboration with the facility wound team.     Wound History:   As per staff report, had not been eating and patient health is declining.  The wound was first discovered on September 26, 2024.  Started as a dermatitis and worsened.  Patient currently on pressure ulcer prevention protocol in the facility    Received patient in bed, nonverbal.  Dependent with turning and repositioning in bed.  Bedbound.  Poor appetite, consumed mostly 25 to 50% of her meals.  Currently on house shake 3 times a day patient is underweight, BMI of 17.3, 4% weight loss for past 30 days    10/8/2024 Follow up for wound on the sacrum . Received patient, not in distress. Facility staff did not report any significant issues related to the wound.  As per report, patient still had a  poor appetite.    October 15, 2024.  Follow-up for wound of the sacrum.  Received patient in bed, seems comfortable.  Patient still have poor appetite.    November 5, 2024.  Follow-up for wound on the sacrum and new consult for wound on the thoracic spine.  As per report, patient health is declining and he she had not been eating.                  Review of Systems   Unable to perform ROS: Acuity of condition       Objective:    Physical Exam  Constitutional:       Appearance: She is ill-appearing.   Cardiovascular:      Rate and Rhythm: Normal rate.   Pulmonary:      Effort: Pulmonary effort is normal.   Genitourinary:     Comments: Incontinent  Musculoskeletal:      Comments: Total dependent with positioning in bed   Skin:     Findings: Lesion present.      Comments: Sacrum: Wound size is 2.2 x 1.4 x 0.1 cm.,  100% slough, no drainage, small amount of serous drainage, periwound normal, with no obvious sign of infection    Thoracic spine: Wound size is 4 x 4.5 x 0.1 cm.,  100% necrotic tissue, moderate amount of serosanguineous drainage, slight erythema on the surrounding tissue,   Neurological:      Mental Status: She is alert.              Procedures           Patient's care was coordinated with nursing facility staff. Recent vitals, labs and updated medications were reviewed on EMR or chart system of facility. Past Medical, surgical, social, medication and allergy history and patient's previous records were reviewed and updated as appropriate: Most up-to date information is available in the facility EMR where the patient is currently admitted.    Patient Active Problem List   Diagnosis    HLD (hyperlipidemia)    Hypothyroidism    CKD (chronic kidney disease)    Benign hypertension with stage 3a chronic kidney disease (HCC)    Iron deficiency anemia    Bilateral lower extremity edema    History of 2019 novel coronavirus disease (COVID-19)    Excoriated rash    Fall at nursing home    Frailty syndrome in geriatric  patient    DNR (do not resuscitate)    Mild mitral regurgitation by prior echocardiogram    Tricuspid regurgitation    Midline back pain    Dementia (HCC)    Drug-induced constipation    ACC/AHA stage B diastolic heart failure (HCC)    Xeroderma    Preventative health care    Pressure injury of sacral region, unstageable (HCC)    Ambulatory dysfunction    Moderate protein malnutrition (HCC)    Pressure injury, unstageable (HCC)     Past Medical History:   Diagnosis Date    Acute encephalopathy 09/24/2022    Ambulatory dysfunction 10/10/2022    Aneurysm (HCC)     brain    CKD (chronic kidney disease)     Fall at nursing home 11/08/2022    Frailty syndrome in geriatric patient 11/13/2022    HLD (hyperlipidemia)     HTN (hypertension)     Hypothyroidism     Impaired cognition 02/27/2023    Midline back pain 02/25/2023     History reviewed. No pertinent surgical history.  Social History     Socioeconomic History    Marital status: Single     Spouse name: None    Number of children: None    Years of education: None    Highest education level: None   Occupational History    None   Tobacco Use    Smoking status: Never    Smokeless tobacco: Never   Vaping Use    Vaping status: Never Used   Substance and Sexual Activity    Alcohol use: Never    Drug use: Never    Sexual activity: None   Other Topics Concern    None   Social History Narrative    None     Social Determinants of Health     Financial Resource Strain: Not on file   Food Insecurity: Not on file   Transportation Needs: No Transportation Needs (9/26/2022)    PRAPARE - Transportation     Lack of Transportation (Medical): No     Lack of Transportation (Non-Medical): No   Physical Activity: Not on file   Stress: Not on file   Social Connections: Not on file   Intimate Partner Violence: Not on file   Housing Stability: Not on file        Current Outpatient Medications:     acetaminophen (TYLENOL) 325 mg tablet, Take 650 mg by mouth 3 (three) times a day, Disp: , Rfl:     " docusate sodium (COLACE) 50 mg capsule, Take 50 mg by mouth 2 (two) times a day, Disp: , Rfl:     ferrous sulfate 324 MG TBEC, Take 324 mg by mouth daily with breakfast, Disp: , Rfl:     levothyroxine 25 mcg tablet, Take 25 mcg by mouth daily, Disp: , Rfl:     Lidocaine 4 % PTCH, Apply 1 patch topically daily, Disp: , Rfl:     Menthol, Topical Analgesic, 4 % GEL, Apply topically, Disp: , Rfl:     multivitamin-minerals therapeutic (THERA-M), Take 1 tablet by mouth daily, Disp: , Rfl:     senna-docusate sodium (SENOKOT S) 8.6-50 mg per tablet, Take 2 tablets by mouth daily at bedtime, Disp: , Rfl:     simvastatin (ZOCOR) 20 mg tablet, Take 20 mg by mouth daily, Disp: , Rfl:   History reviewed. No pertinent family history.           Coordination of Care: Wound team aware of the treatment plan. Facility nurse will provide wound treatment and monitor the wound for any changes.     Patient / Staff education : Patient / Staff was given education on sign of infection and pressure ulcer prevention. Patient/ Staff verbalized understanding     Follow up :  Next week    Voice-recognition software may have been used in the preparation of this document. Occasional wrong word or \"sound-alike\" substitutions may have occurred due to the inherent limitations of voice recognition software. Interpretation should be guided by context.      SARAH Colon  "

## 2024-11-12 ENCOUNTER — NURSING HOME VISIT (OUTPATIENT)
Dept: WOUND CARE | Facility: HOSPITAL | Age: 89
End: 2024-11-12
Payer: MEDICARE

## 2024-11-12 DIAGNOSIS — L89.150 PRESSURE INJURY OF SACRAL REGION, UNSTAGEABLE (HCC): Primary | ICD-10-CM

## 2024-11-12 DIAGNOSIS — E44.0 MODERATE PROTEIN MALNUTRITION (HCC): ICD-10-CM

## 2024-11-12 DIAGNOSIS — L89.95 PRESSURE INJURY, UNSTAGEABLE, UNSPECIFIED LOCATION (HCC): ICD-10-CM

## 2024-11-12 DIAGNOSIS — R62.7 FAILURE TO THRIVE IN ADULT: ICD-10-CM

## 2024-11-12 PROCEDURE — 99309 SBSQ NF CARE MODERATE MDM 30: CPT | Performed by: NURSE PRACTITIONER

## 2024-11-12 NOTE — ASSESSMENT & PLAN NOTE
Thoracic spine  Wound still covered with yellowish neck tissue, the erythema surrounding tissue resolved.  Patient currently on Keflex  Local wound care with Triad paste and bordered foam  Patient have a poor appetite, on protein supplement  This is considered as a unavoidable pressure ulcer.  Patient currently on pressure ulcer prevention protocol in the facility however, despite of all the interventions, patient still develop pressure ulcer  Provided update to Senior care physician.  Follow-up next week to closely monitor the wound

## 2024-11-12 NOTE — PROGRESS NOTES
[facility] WOUND CARE MANAGEMENT   AND HYPERBARIC MEDICINE CENTER       Patient ID: Ebony Rivera is a 89 y.o. female Date of Birth 1/10/1928     Location of Service: [facility]    Performed wound round with: Wound team     Chief Complaint : Sacrum    Wound Instructions:  Wound: Sacrum and upper back  Discontinue previous wound order  Cleanse the wound bed with NSS   Apply non-sting skin prep to periwound area  Apply Triad paste to wound bed, then cover with ABD pad or bordered foam  Frequency : Daily and prn for soiling    Offload all wounds  Turn and reposition frequently  Instruct / Assist with weight shifting in wheelchair  Increase protein intake.  Monitor for any sign of infection or worsening, inform PCP or patient's primary physician in your facility.      Allergies  Patient has no known allergies.      Assessment & Plan:  1. Pressure injury of sacral region, unstageable (HCC)  Assessment & Plan:  Sacrum  Wound size increased, 100% slough, with massive periwound area  Local wound care  -continue Triad paste and ABD pad  Recently seen by registered dietitian, patient lose 4% of her weight for the past 30 days.  BMI of 17.3, patient is underweight.  On house shake 3 times a day.  Had been consuming only 50 to 75% of her meals.  Needs to offload frequently, on air mattress  Provide timely continence care  Patient currently on pressure ulcer prevention protocol in the facility which includes turning and repositioning frequently, air mattress, timely continence care, protein supplement, and local wound care to prevent moisture on the sacrum and buttocks.  Despite of all the interventions, the wound still worsened due to poor medical condition, poor appetite, location of the wound, poor sensation, and immobility.  I will consider this wound as a unavoidable and can potentially worsened.  Facility nursing staff will closely monitor the wound for worsening and infection.  Follow-up next week  2.  Pressure injury, unstageable, unspecified location (HCC)  Assessment & Plan:  Thoracic spine  Wound still covered with yellowish neck tissue, the erythema surrounding tissue resolved.  Patient currently on Keflex  Local wound care with Triad paste and bordered foam  Patient have a poor appetite, on protein supplement  This is considered as a unavoidable pressure ulcer.  Patient currently on pressure ulcer prevention protocol in the facility however, despite of all the interventions, patient still develop pressure ulcer  Provided update to Senior care physician.  Follow-up next week to closely monitor the wound  3. Moderate protein malnutrition (HCC)  Assessment & Plan:  Continue protein supplement, under the care of RD  4. Failure to thrive in adult  Assessment & Plan:  Patient have a poor appetite, had not been eating  Worsening of pressure ulcer on the back and sacrum  Under the care of Senior care team  Recommended hospice consult Senior care physician and unit manager aware                   Subjective:   10/1/2024This is a 89 y.o., female referred to our service for wound/ skin alterations on sacrum.Patient have a complex medical history including but not limited to frailty syndrome, hypertension, and hyperlipidemia. Patient was referred by Senior Care Team. Patient was seen in collaboration with the facility wound team.     Wound History:   As per staff report, had not been eating and patient health is declining.  The wound was first discovered on September 26, 2024.  Started as a dermatitis and worsened.  Patient currently on pressure ulcer prevention protocol in the facility    Received patient in bed, nonverbal.  Dependent with turning and repositioning in bed.  Bedbound.  Poor appetite, consumed mostly 25 to 50% of her meals.  Currently on house shake 3 times a day patient is underweight, BMI of 17.3, 4% weight loss for past 30 days    10/8/2024 Follow up for wound on the sacrum . Received patient, not in  distress. Facility staff did not report any significant issues related to the wound.  As per report, patient still had a poor appetite.    October 15, 2024.  Follow-up for wound of the sacrum.  Received patient in bed, seems comfortable.  Patient still have poor appetite.    November 5, 2024.  Follow-up for wound on the sacrum and new consult for wound on the thoracic spine.  As per report, patient health is declining and he she had not been eating.    November 12, 2024.  Follow-up for wound the sacrum and back.  Received patient in bed, seems comfortable.  As per report, patient still had not been eating.  No one had talked to family members with regards to referral to hospice.                Review of Systems   Unable to perform ROS: Acuity of condition       Objective:    Physical Exam  Constitutional:       Appearance: She is ill-appearing.   Cardiovascular:      Rate and Rhythm: Normal rate.   Pulmonary:      Effort: Pulmonary effort is normal.   Genitourinary:     Comments: Incontinent  Musculoskeletal:      Comments: Total dependent with positioning in bed   Skin:     Findings: Lesion present.      Comments: Sacrum: Wound size is 3 x 2 x 0.1 cm.,  100% left, macerated periwound area, small amount of serous drainage, no obvious sign of infection    Thoracic spine: Wound size is 4 x 3 x 0.1 cm.,  100% slough, moderate amount of serosanguineous drainage, redness on the surrounding tissue resolved   Neurological:      Mental Status: She is alert.              Procedures           Patient's care was coordinated with nursing facility staff. Recent vitals, labs and updated medications were reviewed on EMR or chart system of facility. Past Medical, surgical, social, medication and allergy history and patient's previous records were reviewed and updated as appropriate: Most up-to date information is available in the facility EMR where the patient is currently admitted.    Patient Active Problem List   Diagnosis    HLD  (hyperlipidemia)    Hypothyroidism    CKD (chronic kidney disease)    Benign hypertension with stage 3a chronic kidney disease (HCC)    Iron deficiency anemia    Bilateral lower extremity edema    History of 2019 novel coronavirus disease (COVID-19)    Excoriated rash    Fall at nursing home    Frailty syndrome in geriatric patient    DNR (do not resuscitate)    Mild mitral regurgitation by prior echocardiogram    Tricuspid regurgitation    Midline back pain    Dementia (HCC)    Drug-induced constipation    ACC/AHA stage B diastolic heart failure (HCC)    Xeroderma    Preventative health care    Pressure injury of sacral region, unstageable (HCC)    Ambulatory dysfunction    Moderate protein malnutrition (HCC)    Pressure injury, unstageable (HCC)    Failure to thrive in adult     Past Medical History:   Diagnosis Date    Acute encephalopathy 09/24/2022    Ambulatory dysfunction 10/10/2022    Aneurysm (HCC)     brain    CKD (chronic kidney disease)     Fall at nursing home 11/08/2022    Frailty syndrome in geriatric patient 11/13/2022    HLD (hyperlipidemia)     HTN (hypertension)     Hypothyroidism     Impaired cognition 02/27/2023    Midline back pain 02/25/2023     History reviewed. No pertinent surgical history.  Social History     Socioeconomic History    Marital status: Single     Spouse name: None    Number of children: None    Years of education: None    Highest education level: None   Occupational History    None   Tobacco Use    Smoking status: Never    Smokeless tobacco: Never   Vaping Use    Vaping status: Never Used   Substance and Sexual Activity    Alcohol use: Never    Drug use: Never    Sexual activity: None   Other Topics Concern    None   Social History Narrative    None     Social Determinants of Health     Financial Resource Strain: Not on file   Food Insecurity: Not on file   Transportation Needs: No Transportation Needs (9/26/2022)    PRAPARE - Transportation     Lack of Transportation  "(Medical): No     Lack of Transportation (Non-Medical): No   Physical Activity: Not on file   Stress: Not on file   Social Connections: Not on file   Intimate Partner Violence: Not on file   Housing Stability: Not on file        Current Outpatient Medications:     acetaminophen (TYLENOL) 325 mg tablet, Take 650 mg by mouth 3 (three) times a day, Disp: , Rfl:     docusate sodium (COLACE) 50 mg capsule, Take 50 mg by mouth 2 (two) times a day, Disp: , Rfl:     ferrous sulfate 324 MG TBEC, Take 324 mg by mouth daily with breakfast, Disp: , Rfl:     levothyroxine 25 mcg tablet, Take 25 mcg by mouth daily, Disp: , Rfl:     Lidocaine 4 % PTCH, Apply 1 patch topically daily, Disp: , Rfl:     Menthol, Topical Analgesic, 4 % GEL, Apply topically, Disp: , Rfl:     multivitamin-minerals therapeutic (THERA-M), Take 1 tablet by mouth daily, Disp: , Rfl:     senna-docusate sodium (SENOKOT S) 8.6-50 mg per tablet, Take 2 tablets by mouth daily at bedtime, Disp: , Rfl:     simvastatin (ZOCOR) 20 mg tablet, Take 20 mg by mouth daily, Disp: , Rfl:   History reviewed. No pertinent family history.           Coordination of Care: Wound team aware of the treatment plan. Facility nurse will provide wound treatment and monitor the wound for any changes.     Patient / Staff education : Patient / Staff was given education on sign of infection and pressure ulcer prevention. Patient/ Staff verbalized understanding     Follow up :  Next week    Voice-recognition software may have been used in the preparation of this document. Occasional wrong word or \"sound-alike\" substitutions may have occurred due to the inherent limitations of voice recognition software. Interpretation should be guided by context.      SARAH Colon  "

## 2024-11-12 NOTE — ASSESSMENT & PLAN NOTE
Sacrum  Wound size increased, 100% slough, with massive periwound area  Local wound care  -continue Triad paste and ABD pad  Recently seen by registered dietitian, patient lose 4% of her weight for the past 30 days.  BMI of 17.3, patient is underweight.  On house shake 3 times a day.  Had been consuming only 50 to 75% of her meals.  Needs to offload frequently, on air mattress  Provide timely continence care  Patient currently on pressure ulcer prevention protocol in the facility which includes turning and repositioning frequently, air mattress, timely continence care, protein supplement, and local wound care to prevent moisture on the sacrum and buttocks.  Despite of all the interventions, the wound still worsened due to poor medical condition, poor appetite, location of the wound, poor sensation, and immobility.  I will consider this wound as a unavoidable and can potentially worsened.  Facility nursing staff will closely monitor the wound for worsening and infection.  Follow-up next week

## 2024-11-12 NOTE — ASSESSMENT & PLAN NOTE
Patient have a poor appetite, had not been eating  Worsening of pressure ulcer on the back and sacrum  Under the care of Senior care team  Recommended hospice consult Senior care physician and unit manager aware

## 2024-11-19 ENCOUNTER — NURSING HOME VISIT (OUTPATIENT)
Dept: WOUND CARE | Facility: HOSPITAL | Age: 89
End: 2024-11-19
Payer: MEDICARE

## 2024-11-19 DIAGNOSIS — L89.95 PRESSURE INJURY, UNSTAGEABLE, UNSPECIFIED LOCATION (HCC): ICD-10-CM

## 2024-11-19 DIAGNOSIS — L89.153 PRESSURE INJURY OF SACRAL REGION, STAGE 3 (HCC): Primary | ICD-10-CM

## 2024-11-19 DIAGNOSIS — R62.7 FAILURE TO THRIVE IN ADULT: ICD-10-CM

## 2024-11-19 DIAGNOSIS — E44.0 MODERATE PROTEIN MALNUTRITION (HCC): ICD-10-CM

## 2024-11-19 PROCEDURE — 99309 SBSQ NF CARE MODERATE MDM 30: CPT | Performed by: NURSE PRACTITIONER

## 2024-11-19 NOTE — PROGRESS NOTES
[facility] WOUND CARE MANAGEMENT   AND HYPERBARIC MEDICINE CENTER       Patient ID: Ebony Rivera is a 89 y.o. female Date of Birth 1/10/1928     Location of Service: [facility]    Performed wound round with: Wound team     Chief Complaint : Sacrum and back    Wound Instructions:  Wound: Sacrum   Discontinue previous wound order  Cleanse the wound bed with NSS   Apply non-sting skin prep to periwound area  Apply Triad paste to wound bed, then cover with ABD pad or bordered foam  Frequency : Daily and prn for soiling    Wound: Upper back  Discontinue previous wound order  Cleanse the wound bed with Dakin's quarter strength  Apply nonsting skin prep to periwound area  Apply dry sterile dressing moistened with Dakin's quarter strength to wound bed and cover with ABD pad  Daily and as needed for soiling    Offload all wounds  Turn and reposition frequently  Instruct / Assist with weight shifting in wheelchair  Increase protein intake.  Monitor for any sign of infection or worsening, inform PCP or patient's primary physician in your facility.      Allergies  Patient has no known allergies.      Assessment & Plan:  1. Pressure injury of sacral region, stage 3 ([facility])  Assessment & Plan:  Sacrum  Wound size decreased, increased granulation  Local wound care  -continue Triad paste and ABD pad  Recently seen by registered dietitian, patient lose 4% of her weight for the past 30 days.  BMI of 17.3, patient is underweight.  On house shake 3 times a day.  Had been consuming only 50 to 75% of her meals.  Needs to offload frequently, on air mattress  Provide timely continence care  Patient currently on pressure ulcer prevention protocol in the facility which includes turning and repositioning frequently, air mattress, timely continence care, protein supplement, and local wound care to prevent moisture on the sacrum and buttocks.  Despite of all the interventions, the wound still worsened due to poor medical  condition, poor appetite, location of the wound, poor sensation, and immobility.  I will consider this wound as a unavoidable and can potentially worsened.  Facility nursing staff will closely monitor the wound for worsening and infection.  Follow-up next week  2. Pressure injury, unstageable, unspecified location (HCC)  Assessment & Plan:  Thoracic spine  Wound worsened, increase in slough, with malodor.  Patient recently completed Keflex.  No fever.  Local wound care-change to cleanse with Dakin's quarter strength, and wet-to-dry  Patient have a poor appetite, on protein supplement  This is considered as a unavoidable pressure ulcer.  Patient currently on pressure ulcer prevention protocol in the facility however, despite of all the interventions, patient still develop pressure ulcer  Provided update to Senior care physician.  Follow-up next week to closely monitor the wound  3. Moderate protein malnutrition (HCC)  Assessment & Plan:  Continue protein supplement, under the care of RD  4. Failure to thrive in adult  Assessment & Plan:  Patient have a poor appetite, had not been eating  Worsening of pressure ulcer on the back and sacrum  Under the care of Senior care team  Recommended hospice consult Senior care physician and unit manager aware                     Subjective:   10/1/2024This is a 89 y.o., female referred to our service for wound/ skin alterations on sacrum.Patient have a complex medical history including but not limited to frailty syndrome, hypertension, and hyperlipidemia. Patient was referred by Senior Care Team. Patient was seen in collaboration with the facility wound team.     Wound History:   As per staff report, had not been eating and patient health is declining.  The wound was first discovered on September 26, 2024.  Started as a dermatitis and worsened.  Patient currently on pressure ulcer prevention protocol in the facility    Received patient in bed, nonverbal.  Dependent with turning  and repositioning in bed.  Bedbound.  Poor appetite, consumed mostly 25 to 50% of her meals.  Currently on house shake 3 times a day patient is underweight, BMI of 17.3, 4% weight loss for past 30 days    10/8/2024 Follow up for wound on the sacrum . Received patient, not in distress. Facility staff did not report any significant issues related to the wound.  As per report, patient still had a poor appetite.    October 15, 2024.  Follow-up for wound of the sacrum.  Received patient in bed, seems comfortable.  Patient still have poor appetite.    November 5, 2024.  Follow-up for wound on the sacrum and new consult for wound on the thoracic spine.  As per report, patient health is declining and he she had not been eating.    November 12, 2024.  Follow-up for wound the sacrum and back.  Received patient in bed, seems comfortable.  As per report, patient still had not been eating.  No one had talked to family members with regards to referral to hospice.    November 19, 2024.  Follow-up for wound on the sacrum and back.  Received patient in bed, seems comfortable.  As per report, patient had not been eating well.                Review of Systems   Unable to perform ROS: Acuity of condition       Objective:    Physical Exam  Constitutional:       Appearance: She is ill-appearing.   Cardiovascular:      Rate and Rhythm: Normal rate.   Pulmonary:      Effort: Pulmonary effort is normal.   Genitourinary:     Comments: Incontinent  Musculoskeletal:      Comments: Total dependent with positioning in bed   Skin:     Findings: Lesion present.      Comments: Sacrum: Wound size is 2 x 1.5 x 0.1 cm.,  100% granulation, small amount of serous drainage, macerated periwound area, with no obvious sign of infection    Thoracic spine: Wound size is 3.5 x 3 x 0.1 cm.,  100% slough, moderate amount of serosanguineous drainage, periwound is red, positive malodor   Neurological:      Mental Status: She is alert.              Procedures            Patient's care was coordinated with nursing facility staff. Recent vitals, labs and updated medications were reviewed on EMR or chart system of facility. Past Medical, surgical, social, medication and allergy history and patient's previous records were reviewed and updated as appropriate: Most up-to date information is available in the facility EMR where the patient is currently admitted.    Patient Active Problem List   Diagnosis    HLD (hyperlipidemia)    Hypothyroidism    CKD (chronic kidney disease)    Benign hypertension with stage 3a chronic kidney disease (HCC)    Iron deficiency anemia    Bilateral lower extremity edema    History of 2019 novel coronavirus disease (COVID-19)    Excoriated rash    Fall at nursing home    Frailty syndrome in geriatric patient    DNR (do not resuscitate)    Mild mitral regurgitation by prior echocardiogram    Tricuspid regurgitation    Midline back pain    Dementia (HCC)    Drug-induced constipation    ACC/AHA stage B diastolic heart failure (HCC)    Xeroderma    Preventative health care    Pressure injury of sacral region, stage 3 (HCC)    Ambulatory dysfunction    Moderate protein malnutrition (HCC)    Pressure injury, unstageable (HCC)    Failure to thrive in adult     Past Medical History:   Diagnosis Date    Acute encephalopathy 09/24/2022    Ambulatory dysfunction 10/10/2022    Aneurysm (HCC)     brain    CKD (chronic kidney disease)     Fall at nursing home 11/08/2022    Frailty syndrome in geriatric patient 11/13/2022    HLD (hyperlipidemia)     HTN (hypertension)     Hypothyroidism     Impaired cognition 02/27/2023    Midline back pain 02/25/2023     History reviewed. No pertinent surgical history.  Social History     Socioeconomic History    Marital status: Single     Spouse name: None    Number of children: None    Years of education: None    Highest education level: None   Occupational History    None   Tobacco Use    Smoking status: Never    Smokeless tobacco:  Never   Vaping Use    Vaping status: Never Used   Substance and Sexual Activity    Alcohol use: Never    Drug use: Never    Sexual activity: None   Other Topics Concern    None   Social History Narrative    None     Social Drivers of Health     Financial Resource Strain: Not on file   Food Insecurity: Not on file   Transportation Needs: No Transportation Needs (9/26/2022)    PRAPARE - Transportation     Lack of Transportation (Medical): No     Lack of Transportation (Non-Medical): No   Physical Activity: Not on file   Stress: Not on file   Social Connections: Not on file   Intimate Partner Violence: Not on file   Housing Stability: Not on file        Current Outpatient Medications:     acetaminophen (TYLENOL) 325 mg tablet, Take 650 mg by mouth 3 (three) times a day, Disp: , Rfl:     docusate sodium (COLACE) 50 mg capsule, Take 50 mg by mouth 2 (two) times a day, Disp: , Rfl:     ferrous sulfate 324 MG TBEC, Take 324 mg by mouth daily with breakfast, Disp: , Rfl:     levothyroxine 25 mcg tablet, Take 25 mcg by mouth daily, Disp: , Rfl:     Lidocaine 4 % PTCH, Apply 1 patch topically daily, Disp: , Rfl:     Menthol, Topical Analgesic, 4 % GEL, Apply topically, Disp: , Rfl:     multivitamin-minerals therapeutic (THERA-M), Take 1 tablet by mouth daily, Disp: , Rfl:     senna-docusate sodium (SENOKOT S) 8.6-50 mg per tablet, Take 2 tablets by mouth daily at bedtime, Disp: , Rfl:     simvastatin (ZOCOR) 20 mg tablet, Take 20 mg by mouth daily, Disp: , Rfl:   History reviewed. No pertinent family history.           Coordination of Care: Wound team aware of the treatment plan. Facility nurse will provide wound treatment and monitor the wound for any changes.     Patient / Staff education : Patient / Staff was given education on sign of infection and pressure ulcer prevention. Patient/ Staff verbalized understanding     Follow up :  Next week    Voice-recognition software may have been used in the preparation of this  "document. Occasional wrong word or \"sound-alike\" substitutions may have occurred due to the inherent limitations of voice recognition software. Interpretation should be guided by context.      SARAH Colon  "

## 2024-11-19 NOTE — ASSESSMENT & PLAN NOTE
Thoracic spine  Wound worsened, increase in slough, with malodor.  Patient recently completed Keflex.  No fever.  Local wound care-change to cleanse with Dakin's quarter strength, and wet-to-dry  Patient have a poor appetite, on protein supplement  This is considered as a unavoidable pressure ulcer.  Patient currently on pressure ulcer prevention protocol in the facility however, despite of all the interventions, patient still develop pressure ulcer  Provided update to Senior care physician.  Follow-up next week to closely monitor the wound

## 2024-11-19 NOTE — ASSESSMENT & PLAN NOTE
Sacrum  Wound size decreased, increased granulation  Local wound care  -continue Triad paste and ABD pad  Recently seen by registered dietitian, patient lose 4% of her weight for the past 30 days.  BMI of 17.3, patient is underweight.  On house shake 3 times a day.  Had been consuming only 50 to 75% of her meals.  Needs to offload frequently, on air mattress  Provide timely continence care  Patient currently on pressure ulcer prevention protocol in the facility which includes turning and repositioning frequently, air mattress, timely continence care, protein supplement, and local wound care to prevent moisture on the sacrum and buttocks.  Despite of all the interventions, the wound still worsened due to poor medical condition, poor appetite, location of the wound, poor sensation, and immobility.  I will consider this wound as a unavoidable and can potentially worsened.  Facility nursing staff will closely monitor the wound for worsening and infection.  Follow-up next week

## 2024-11-20 ENCOUNTER — HOME CARE VISIT (OUTPATIENT)
Dept: HOME HEALTH SERVICES | Facility: HOME HEALTHCARE | Age: 89
End: 2024-11-20
Payer: MEDICARE

## 2024-11-20 ENCOUNTER — NURSING HOME VISIT (OUTPATIENT)
Dept: GERIATRICS | Facility: OTHER | Age: 89
End: 2024-11-20
Payer: MEDICARE

## 2024-11-20 ENCOUNTER — HOSPICE ADMISSION (OUTPATIENT)
Dept: HOME HOSPICE | Facility: HOSPICE | Age: 89
End: 2024-11-20
Payer: MEDICARE

## 2024-11-20 VITALS
BODY MASS INDEX: 16.41 KG/M2 | SYSTOLIC BLOOD PRESSURE: 129 MMHG | TEMPERATURE: 98.1 F | WEIGHT: 98.6 LBS | HEART RATE: 83 BPM | DIASTOLIC BLOOD PRESSURE: 62 MMHG

## 2024-11-20 DIAGNOSIS — L89.153 PRESSURE INJURY OF SACRAL REGION, STAGE 3 (HCC): ICD-10-CM

## 2024-11-20 DIAGNOSIS — F03.C0 SEVERE DEMENTIA WITHOUT BEHAVIORAL DISTURBANCE, PSYCHOTIC DISTURBANCE, MOOD DISTURBANCE, OR ANXIETY, UNSPECIFIED DEMENTIA TYPE (HCC): ICD-10-CM

## 2024-11-20 DIAGNOSIS — Z71.89 GOALS OF CARE, COUNSELING/DISCUSSION: Primary | ICD-10-CM

## 2024-11-20 DIAGNOSIS — E43 SEVERE PROTEIN-CALORIE MALNUTRITION (HCC): ICD-10-CM
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
## 2024-11-20 PROCEDURE — 99310 SBSQ NF CARE HIGH MDM 45: CPT

## 2024-11-20 NOTE — ASSESSMENT & PLAN NOTE
Followed by wound NP   Poor nutrition   Per wound NP note wound appears to be worsening despite interventions likely in the setting of protein calorie malnutrition

## 2024-11-20 NOTE — PROGRESS NOTES
[facility]  [address], [place], [place]  [phone]  Facility: [facility]   POS 32    NAME: Ebony Rivera  AGE: 89 y.o. SEX: female CODE STATUS: No CPR  : 1/10/1928     DATE: 2024     Assessment and Plan:     Problem List Items Addressed This Visit       Dementia (HCC)    Progressive decline   Redirection, reorientation and distraction as appropriate   Fall/safety precautions  Supportive care, assistance with ADLs   Avoid deliriogenic medications   Encourage adequate hydration and nutrition   Maintain sleep/wake cycle   Consult hospice          Pressure injury of sacral region, stage 3 (HCC)    Followed by wound NP   Poor nutrition   Per wound NP note wound appears to be worsening despite interventions likely in the setting of protein calorie malnutrition         Severe protein-calorie malnutrition (HCC)    Significant weight loss since 2024, -19 lb  Only eating ~25% of meals   Poor wound healing   Consult hospice services          Goals of care, counseling/discussion - Primary    Patient have a poor appetite, had not been eating  Worsening of pressure ulcer on the back and sacrum  Weight loss of 19 lbs in 5 months   Spoke with patient's Niece Keke regarding progressive decline and weight loss, discussed hospice which she was agreeable to   Will consult [facility]                Chief Complaint:          History of Present Illness:     Patient seen for goals of care discussion today. Per nursing staff patient has been deteriorating. She has not had good appetite, only eating about 25% of meals. She has nonhealing sacral wound. Patient has had considerable weight loss within the last 5 months. On exam patient is arousable but does not stay awake for conversation. Staff reports she does not get out of bed. Spoke with patient's niece over the phone regarding patient decline. Niece understands she has been declining and feels hospice is appropriate next  step.         The following portions of the patient's history were reviewed and updated as appropriate: allergies, current medications, past family history, past medical history, past social history, past surgical history and problem list.     Review of Systems:     Review of Systems   Unable to perform ROS: Dementia        Problem List:     Patient Active Problem List   Diagnosis    HLD (hyperlipidemia)    Hypothyroidism    CKD (chronic kidney disease)    Benign hypertension with stage 3a chronic kidney disease (HCC)    Iron deficiency anemia    Bilateral lower extremity edema    History of 2019 novel coronavirus disease (COVID-19)    Excoriated rash    Frailty syndrome in geriatric patient    DNR (do not resuscitate)    Mild mitral regurgitation by prior echocardiogram    Tricuspid regurgitation    Midline back pain    Dementia (HCC)    Drug-induced constipation    ACC/AHA stage B diastolic heart failure (HCC)    Xeroderma    Preventative health care    Pressure injury of sacral region, stage 3 (HCC)    Ambulatory dysfunction    Severe protein-calorie malnutrition (HCC)    Pressure injury, unstageable (HCC)    Failure to thrive in adult    Goals of care, counseling/discussion        Objective:     /62   Pulse 83   Temp 98.1 °F (36.7 °C)   Wt 44.7 kg (98 lb 9.6 oz)   BMI 16.41 kg/m²     Physical Exam  Vitals and nursing note reviewed.   Constitutional:       General: She is sleeping. She is not in acute distress.  HENT:      Head: Normocephalic and atraumatic.   Eyes:      Conjunctiva/sclera: Conjunctivae normal.   Cardiovascular:      Rate and Rhythm: Normal rate and regular rhythm.      Heart sounds: No murmur heard.  Pulmonary:      Effort: Pulmonary effort is normal. No respiratory distress.      Breath sounds: Normal breath sounds.   Abdominal:      Palpations: Abdomen is soft.      Tenderness: There is no abdominal tenderness.   Musculoskeletal:         General: No swelling.      Cervical back: Neck  supple.   Skin:     General: Skin is warm and dry.      Capillary Refill: Capillary refill takes less than 2 seconds.   Neurological:      Motor: Weakness present.   Psychiatric:         Speech: She is noncommunicative.         Cognition and Memory: Cognition is impaired.         Pertinent Laboratory/Diagnostic Studies:    Laboratory Results: I have personally reviewed the pertinent laboratory results/reports     Radiology/Other Diagnostic Testing Results: Results Review Statement: No pertinent imaging studies reviewed.    SARAH Singh  Geriatric Medicine

## 2024-11-20 NOTE — ASSESSMENT & PLAN NOTE
Patient have a poor appetite, had not been eating  Worsening of pressure ulcer on the back and sacrum  Spoke with patient's Niece Keke regarding progressive decline and weight loss, discussed hospice which she was agreeable to   Will consult [facility]

## 2024-11-20 NOTE — ASSESSMENT & PLAN NOTE
Patient have a poor appetite, had not been eating  Worsening of pressure ulcer on the back and sacrum  Weight loss of 19 lbs in 5 months   Spoke with patient's Niece Keke regarding progressive decline and weight loss, discussed hospice which she was agreeable to   Will consult [facility]

## 2024-11-20 NOTE — ASSESSMENT & PLAN NOTE
Progressive decline   Redirection, reorientation and distraction as appropriate   Fall/safety precautions  Supportive care, assistance with ADLs   Avoid deliriogenic medications   Encourage adequate hydration and nutrition   Maintain sleep/wake cycle   Consult hospice

## 2024-11-20 NOTE — ASSESSMENT & PLAN NOTE
Significant weight loss since June 2024, -19 lb  Only eating ~25% of meals   Poor wound healing   Consult hospice services

## 2024-11-21 ENCOUNTER — HOME CARE VISIT (OUTPATIENT)
Dept: HOME HOSPICE | Facility: HOSPICE | Age: 89
End: 2024-11-21
Payer: MEDICARE

## 2024-11-21 ENCOUNTER — HOME CARE VISIT (OUTPATIENT)
Dept: HOME HEALTH SERVICES | Facility: HOME HEALTHCARE | Age: 89
End: 2024-11-21
Payer: MEDICARE

## 2024-11-21 VITALS
HEIGHT: 65 IN | TEMPERATURE: 97.9 F | DIASTOLIC BLOOD PRESSURE: 58 MMHG | RESPIRATION RATE: 24 BRPM | SYSTOLIC BLOOD PRESSURE: 122 MMHG | BODY MASS INDEX: 16.33 KG/M2 | WEIGHT: 98 LBS | HEART RATE: 98 BPM

## 2024-11-21 DIAGNOSIS — Z51.5 HOSPICE CARE PATIENT: Primary | ICD-10-CM

## 2024-11-21 DIAGNOSIS — F03.90 SENILE DEMENTIA (HCC): ICD-10-CM

## 2024-11-21 DIAGNOSIS — E43 SEVERE PROTEIN-CALORIE MALNUTRITION (HCC): ICD-10-CM

## 2024-11-21 PROCEDURE — G0155 HHCP-SVS OF CSW,EA 15 MIN: HCPCS

## 2024-11-21 PROCEDURE — G0299 HHS/HOSPICE OF RN EA 15 MIN: HCPCS

## 2024-11-21 PROCEDURE — 10330064 FOAM, ADH SIL W/BORDER 4X4" (10/BX 20BX"

## 2024-11-21 PROCEDURE — 10330064 PAD, ABD 5X9 STR LF (1/PK 20PK/BX) MGM1"

## 2024-11-21 PROCEDURE — 10330057 MEDICATION, GENERAL

## 2024-11-21 PROCEDURE — 10330064

## 2024-11-21 PROCEDURE — 10330064 TAPE, ADHSV TRANSPORE 2X10YDS(6RL/BX)"

## 2024-11-21 RX ORDER — LORAZEPAM 2 MG/ML
0.5 CONCENTRATE ORAL EVERY 6 HOURS PRN
Qty: 30 ML | Refills: 0 | Status: SHIPPED | OUTPATIENT
Start: 2024-11-21

## 2024-11-21 RX ORDER — MORPHINE SULFATE 100 MG/5ML
5 SOLUTION, CONCENTRATE ORAL EVERY 2 HOUR PRN
Qty: 30 ML | Refills: 0 | Status: SHIPPED | OUTPATIENT
Start: 2024-11-21

## 2024-11-21 NOTE — TELEPHONE ENCOUNTER
11/21/2024 11:57 AM  [facility] facility patient requests SOC medications.  Filled electronically via Epic as per [place] State Law.    Requested Prescriptions     Signed Prescriptions Disp Refills    Morphine Sulfate, Concentrate, 20 mg/mL concentrated solution 30 mL 0     Sig: Take 0.25 mL (5 mg total) by mouth every 2 (two) hours as needed (pain / SOB) Administer under the tongue Max Daily Amount: 60 mg     Authorizing Provider: JANETH MONTERO    LORazepam (ATIVAN) 2 mg/mL concentrated solution 30 mL 0     Sig: Take 0.25 mL (0.5 mg total) by mouth every 6 (six) hours as needed for anxiety or sleep (SOB/restlessness) Administer under the tongue     Authorizing Provider: JANETH MONTERO CRNP  [facility]  Hospice Answering Service: [phone]

## 2024-11-22 ENCOUNTER — HOME CARE VISIT (OUTPATIENT)
Dept: HOME HEALTH SERVICES | Facility: HOME HEALTHCARE | Age: 89
End: 2024-11-22
Payer: MEDICARE

## 2024-11-22 ENCOUNTER — HOME CARE VISIT (OUTPATIENT)
Dept: HOME HOSPICE | Facility: HOSPICE | Age: 89
End: 2024-11-22
Payer: MEDICARE

## 2024-11-22 PROCEDURE — G0299 HHS/HOSPICE OF RN EA 15 MIN: HCPCS

## 2024-11-22 PROCEDURE — G0156 HHCP-SVS OF AIDE,EA 15 MIN: HCPCS

## 2024-11-25 ENCOUNTER — HOME CARE VISIT (OUTPATIENT)
Dept: HOME HEALTH SERVICES | Facility: HOME HEALTHCARE | Age: 89
End: 2024-11-25
Payer: MEDICARE

## 2024-11-25 PROCEDURE — G0156 HHCP-SVS OF AIDE,EA 15 MIN: HCPCS

## 2024-11-26 ENCOUNTER — HOME CARE VISIT (OUTPATIENT)
Dept: HOME HOSPICE | Facility: HOSPICE | Age: 89
End: 2024-11-26
Payer: MEDICARE

## 2024-11-26 ENCOUNTER — HOME CARE VISIT (OUTPATIENT)
Dept: HOME HEALTH SERVICES | Facility: HOME HEALTHCARE | Age: 89
End: 2024-11-26
Payer: MEDICARE

## 2024-11-26 PROCEDURE — G0156 HHCP-SVS OF AIDE,EA 15 MIN: HCPCS

## 2024-11-27 ENCOUNTER — HOME CARE VISIT (OUTPATIENT)
Dept: HOME HEALTH SERVICES | Facility: HOME HEALTHCARE | Age: 89
End: 2024-11-27
Payer: MEDICARE

## 2024-11-27 VITALS
SYSTOLIC BLOOD PRESSURE: 120 MMHG | DIASTOLIC BLOOD PRESSURE: 60 MMHG | RESPIRATION RATE: 20 BRPM | TEMPERATURE: 98 F | HEART RATE: 84 BPM

## 2024-11-27 PROCEDURE — G0299 HHS/HOSPICE OF RN EA 15 MIN: HCPCS

## 2024-11-27 PROCEDURE — G0156 HHCP-SVS OF AIDE,EA 15 MIN: HCPCS

## 2024-11-29 ENCOUNTER — HOME CARE VISIT (OUTPATIENT)
Dept: HOME HEALTH SERVICES | Facility: HOME HEALTHCARE | Age: 89
End: 2024-11-29
Payer: MEDICARE

## 2024-11-29 PROCEDURE — G0156 HHCP-SVS OF AIDE,EA 15 MIN: HCPCS

## 2024-12-02 ENCOUNTER — HOME CARE VISIT (OUTPATIENT)
Dept: HOME HEALTH SERVICES | Facility: HOME HEALTHCARE | Age: 89
End: 2024-12-02
Payer: MEDICARE

## 2024-12-02 ENCOUNTER — HOME CARE VISIT (OUTPATIENT)
Dept: HOME HOSPICE | Facility: HOSPICE | Age: 89
End: 2024-12-02
Payer: MEDICARE

## 2024-12-02 VITALS — SYSTOLIC BLOOD PRESSURE: 110 MMHG | RESPIRATION RATE: 18 BRPM | HEART RATE: 80 BPM | DIASTOLIC BLOOD PRESSURE: 70 MMHG

## 2024-12-02 PROCEDURE — G0155 HHCP-SVS OF CSW,EA 15 MIN: HCPCS

## 2024-12-02 PROCEDURE — G0299 HHS/HOSPICE OF RN EA 15 MIN: HCPCS

## 2024-12-02 PROCEDURE — G0156 HHCP-SVS OF AIDE,EA 15 MIN: HCPCS

## 2024-12-03 ENCOUNTER — HOME CARE VISIT (OUTPATIENT)
Dept: HOME HEALTH SERVICES | Facility: HOME HEALTHCARE | Age: 89
End: 2024-12-03
Payer: MEDICARE

## 2024-12-03 PROCEDURE — G0156 HHCP-SVS OF AIDE,EA 15 MIN: HCPCS

## 2024-12-04 ENCOUNTER — HOME CARE VISIT (OUTPATIENT)
Dept: HOME HEALTH SERVICES | Facility: HOME HEALTHCARE | Age: 89
End: 2024-12-04
Payer: MEDICARE

## 2024-12-04 PROCEDURE — G0156 HHCP-SVS OF AIDE,EA 15 MIN: HCPCS

## 2024-12-06 ENCOUNTER — HOME CARE VISIT (OUTPATIENT)
Dept: HOME HOSPICE | Facility: HOSPICE | Age: 89
End: 2024-12-06
Payer: MEDICARE

## 2024-12-06 ENCOUNTER — HOME CARE VISIT (OUTPATIENT)
Dept: HOME HEALTH SERVICES | Facility: HOME HEALTHCARE | Age: 89
End: 2024-12-06
Payer: MEDICARE

## 2024-12-06 PROCEDURE — G0156 HHCP-SVS OF AIDE,EA 15 MIN: HCPCS

## 2024-12-09 ENCOUNTER — HOME CARE VISIT (OUTPATIENT)
Dept: HOME HEALTH SERVICES | Facility: HOME HEALTHCARE | Age: 89
End: 2024-12-09
Payer: MEDICARE

## 2024-12-09 PROCEDURE — G0156 HHCP-SVS OF AIDE,EA 15 MIN: HCPCS

## 2024-12-11 ENCOUNTER — TELEPHONE (OUTPATIENT)
Dept: HOME HEALTH SERVICES | Facility: HOME HEALTHCARE | Age: 89
End: 2024-12-11

## 2024-12-11 ENCOUNTER — HOME CARE VISIT (OUTPATIENT)
Dept: HOME HOSPICE | Facility: HOSPICE | Age: 89
End: 2024-12-11
Payer: MEDICARE

## 2024-12-11 ENCOUNTER — HOME CARE VISIT (OUTPATIENT)
Dept: HOME HEALTH SERVICES | Facility: HOME HEALTHCARE | Age: 89
End: 2024-12-11
Payer: MEDICARE

## 2024-12-11 VITALS — RESPIRATION RATE: 20 BRPM | SYSTOLIC BLOOD PRESSURE: 110 MMHG | HEART RATE: 80 BPM | DIASTOLIC BLOOD PRESSURE: 40 MMHG

## 2024-12-11 DIAGNOSIS — E43 SEVERE PROTEIN-CALORIE MALNUTRITION (HCC): ICD-10-CM

## 2024-12-11 DIAGNOSIS — Z51.5 HOSPICE CARE PATIENT: ICD-10-CM

## 2024-12-11 DIAGNOSIS — F03.90 SENILE DEMENTIA (HCC): ICD-10-CM

## 2024-12-11 PROCEDURE — G0299 HHS/HOSPICE OF RN EA 15 MIN: HCPCS

## 2024-12-11 NOTE — TELEPHONE ENCOUNTER
12/11/2024 10:26 AM  [facility] facility patient requests refill of CII medication and medication adjusted due to change in patient condition.  Filled electronically via Epic as per [place] State Law.    Requested Prescriptions     Signed Prescriptions Disp Refills    Morphine Sulfate, Concentrate, 20 mg/mL concentrated solution 30 mL 0     Sig: Take 0.25 mL (5 mg total) by mouth 2 (two) times a day. May also take 0.25 mL (5 mg total) every 2 (two) hours as needed (pain / SOB / wound care). Administer under the tongue. Max Daily Amount: 70 mg.     Authorizing Provider: JANETH MONTERO CRNP  [facility]  Hospice Answering Service: [phone]

## 2024-12-12 ENCOUNTER — HOME CARE VISIT (OUTPATIENT)
Dept: HOME HEALTH SERVICES | Facility: HOME HEALTHCARE | Age: 89
End: 2024-12-12
Payer: MEDICARE

## 2024-12-12 PROCEDURE — G0156 HHCP-SVS OF AIDE,EA 15 MIN: HCPCS

## 2024-12-13 ENCOUNTER — HOME CARE VISIT (OUTPATIENT)
Dept: HOME HEALTH SERVICES | Facility: HOME HEALTHCARE | Age: 89
End: 2024-12-13
Payer: MEDICARE

## 2024-12-13 PROCEDURE — G0156 HHCP-SVS OF AIDE,EA 15 MIN: HCPCS

## 2024-12-16 ENCOUNTER — HOME CARE VISIT (OUTPATIENT)
Dept: HOME HEALTH SERVICES | Facility: HOME HEALTHCARE | Age: 89
End: 2024-12-16
Payer: MEDICARE

## 2024-12-16 PROCEDURE — G0156 HHCP-SVS OF AIDE,EA 15 MIN: HCPCS

## 2024-12-17 ENCOUNTER — HOME CARE VISIT (OUTPATIENT)
Dept: HOME HOSPICE | Facility: HOSPICE | Age: 89
End: 2024-12-17
Payer: MEDICARE

## 2024-12-17 ENCOUNTER — HOME CARE VISIT (OUTPATIENT)
Dept: HOME HEALTH SERVICES | Facility: HOME HEALTHCARE | Age: 89
End: 2024-12-17
Payer: MEDICARE

## 2024-12-17 VITALS
TEMPERATURE: 96 F | HEART RATE: 80 BPM | SYSTOLIC BLOOD PRESSURE: 118 MMHG | RESPIRATION RATE: 20 BRPM | DIASTOLIC BLOOD PRESSURE: 70 MMHG

## 2024-12-17 DIAGNOSIS — Z51.5 HOSPICE CARE PATIENT: Primary | ICD-10-CM

## 2024-12-17 PROCEDURE — G0156 HHCP-SVS OF AIDE,EA 15 MIN: HCPCS

## 2024-12-17 PROCEDURE — G0299 HHS/HOSPICE OF RN EA 15 MIN: HCPCS

## 2024-12-18 ENCOUNTER — HOME CARE VISIT (OUTPATIENT)
Dept: HOME HEALTH SERVICES | Facility: HOME HEALTHCARE | Age: 89
End: 2024-12-18
Payer: MEDICARE

## 2024-12-18 PROCEDURE — G0156 HHCP-SVS OF AIDE,EA 15 MIN: HCPCS

## 2024-12-18 PROCEDURE — G0299 HHS/HOSPICE OF RN EA 15 MIN: HCPCS

## 2024-12-19 ENCOUNTER — HOME CARE VISIT (OUTPATIENT)
Dept: HOME HEALTH SERVICES | Facility: HOME HEALTHCARE | Age: 89
End: 2024-12-19
Payer: MEDICARE

## 2024-12-19 PROCEDURE — G0156 HHCP-SVS OF AIDE,EA 15 MIN: HCPCS

## 2024-12-20 ENCOUNTER — HOME CARE VISIT (OUTPATIENT)
Dept: HOME HEALTH SERVICES | Facility: HOME HEALTHCARE | Age: 89
End: 2024-12-20
Payer: MEDICARE

## 2024-12-20 PROCEDURE — G0156 HHCP-SVS OF AIDE,EA 15 MIN: HCPCS

## 2024-12-23 ENCOUNTER — HOME CARE VISIT (OUTPATIENT)
Dept: HOME HEALTH SERVICES | Facility: HOME HEALTHCARE | Age: 89
End: 2024-12-23
Payer: MEDICARE

## 2024-12-23 ENCOUNTER — HOME CARE VISIT (OUTPATIENT)
Dept: HOME HOSPICE | Facility: HOSPICE | Age: 89
End: 2024-12-23
Payer: MEDICARE

## 2024-12-23 VITALS — HEART RATE: 92 BPM | SYSTOLIC BLOOD PRESSURE: 110 MMHG | DIASTOLIC BLOOD PRESSURE: 60 MMHG | RESPIRATION RATE: 20 BRPM

## 2024-12-23 PROCEDURE — G0299 HHS/HOSPICE OF RN EA 15 MIN: HCPCS

## 2024-12-23 PROCEDURE — G0155 HHCP-SVS OF CSW,EA 15 MIN: HCPCS

## 2024-12-23 PROCEDURE — G0156 HHCP-SVS OF AIDE,EA 15 MIN: HCPCS

## 2024-12-26 ENCOUNTER — HOME CARE VISIT (OUTPATIENT)
Dept: HOME HEALTH SERVICES | Facility: HOME HEALTHCARE | Age: 89
End: 2024-12-26
Payer: MEDICARE

## 2024-12-26 ENCOUNTER — NURSING HOME VISIT (OUTPATIENT)
Dept: GERIATRICS | Facility: OTHER | Age: 89
End: 2024-12-26
Payer: MEDICARE

## 2024-12-26 DIAGNOSIS — F03.C0 SEVERE DEMENTIA WITHOUT BEHAVIORAL DISTURBANCE, PSYCHOTIC DISTURBANCE, MOOD DISTURBANCE, OR ANXIETY, UNSPECIFIED DEMENTIA TYPE (HCC): ICD-10-CM

## 2024-12-26 DIAGNOSIS — Z51.5 ENCOUNTER FOR PALLIATIVE CARE: Primary | ICD-10-CM

## 2024-12-26 PROCEDURE — G0156 HHCP-SVS OF AIDE,EA 15 MIN: HCPCS

## 2024-12-26 PROCEDURE — 99308 SBSQ NF CARE LOW MDM 20: CPT | Performed by: INTERNAL MEDICINE

## 2024-12-26 NOTE — PROGRESS NOTES
"[facility]  [address] [place] [address]  [phone]   [facility]   POS 32      NAME: Ebony Rivera  AGE: 89 y.o. SEX: female [id number]    DATE OF ENCOUNTER: 12/26/2024    Assessment and Plan     1. Encounter for palliative care        2. Severe dementia without behavioral disturbance, psychotic disturbance, mood disturbance, or anxiety, unspecified dementia type (HCC)           Dementia (HCC)  Pt still awake and alert  Redirect, reorient and provide distraction techniques  Assist with adls/iadls  Cont 24/7 longterm supportive care  Cont aggressive stool softners  Encourage group participation  Cont care per hospice    Encounter for palliative care  Pt on hospice  Pt refuses most meals  Only eats 25% of one meal a day on average  Encourage pt to eat  Assist with adls/iadls  Cont 24/7 longterm supportive care  Cont care per hospice     Code status: DNR/DNI/hospice    Chief Complaint     Long term follow-up visit.    History of Present Illness   Pt seen and examined. Pt says \"what?\" When I ask her how she is. Pt at baseline. Pt on hospice.    The following portions of the patient's history were reviewed and updated as appropriate: allergies, current medications, past family history, past medical history, past social history, past surgical history and problem list.    Review of Systems     Review of Systems   Unable to perform ROS: Dementia     A comprehensive review of symptoms was obtained.  Pertinent positives and negatives noted in HPI.     Objective     Vitals: Reviewed in [place] system. VSS    11/5/2024 14:26 98.6 Lbs Mechanical lift scale      10/12/2024 14:14 100.3 Lbs         General: Awake, alert dementia  Head: atraumatic, normocephalic  Cardiovascular: RRR  Lungs: Clear to auscultation bilaterally  Abdomen: nontender/nondistended, +BS  Legs: no cyanosis, clubbing or edema  Feet: clean, dry, intact; positive pedal pulses  Skin: clean, dry  Psych: calm, " cooperative    Pertinent Laboratory/Diagnostic Studies:  Refer to facility chart.    Current Medications   Medications reviewed and updated in facility chart.    Kyra Canseco MD  Internal Medicine  Senior Care Physician

## 2024-12-27 ENCOUNTER — HOME CARE VISIT (OUTPATIENT)
Dept: HOME HEALTH SERVICES | Facility: HOME HEALTHCARE | Age: 89
End: 2024-12-27
Payer: MEDICARE

## 2024-12-27 ENCOUNTER — HOME CARE VISIT (OUTPATIENT)
Dept: HOME HOSPICE | Facility: HOSPICE | Age: 89
End: 2024-12-27
Payer: MEDICARE

## 2024-12-27 PROBLEM — Z51.5 ENCOUNTER FOR PALLIATIVE CARE: Status: ACTIVE | Noted: 2024-11-20

## 2024-12-27 PROCEDURE — G0156 HHCP-SVS OF AIDE,EA 15 MIN: HCPCS

## 2024-12-27 NOTE — ASSESSMENT & PLAN NOTE
Pt still awake and alert  Redirect, reorient and provide distraction techniques  Assist with adls/iadls  Cont 24/7 longterm supportive care  Cont aggressive stool softners  Encourage group participation  Cont care per hospice

## 2024-12-27 NOTE — ASSESSMENT & PLAN NOTE
Pt on hospice  Encourage pt to eat  Assist with adls/iadls  Cont 24/7 longterm supportive care  Cont care per hospice

## 2024-12-27 NOTE — ASSESSMENT & PLAN NOTE
Pt on hospice  Pt refuses most meals  Only eats 25% of one meal a day on average  Encourage pt to eat  Assist with adls/iadls  Cont 24/7 longterm supportive care  Cont care per hospice

## 2024-12-30 ENCOUNTER — HOME CARE VISIT (OUTPATIENT)
Dept: HOME HEALTH SERVICES | Facility: HOME HEALTHCARE | Age: 89
End: 2024-12-30
Payer: MEDICARE

## 2024-12-30 VITALS
SYSTOLIC BLOOD PRESSURE: 120 MMHG | DIASTOLIC BLOOD PRESSURE: 70 MMHG | HEART RATE: 92 BPM | RESPIRATION RATE: 20 BRPM | TEMPERATURE: 97 F

## 2024-12-30 PROCEDURE — G0299 HHS/HOSPICE OF RN EA 15 MIN: HCPCS

## 2024-12-30 PROCEDURE — G0156 HHCP-SVS OF AIDE,EA 15 MIN: HCPCS

## 2024-12-31 ENCOUNTER — HOME CARE VISIT (OUTPATIENT)
Dept: HOME HEALTH SERVICES | Facility: HOME HEALTHCARE | Age: 89
End: 2024-12-31
Payer: MEDICARE

## 2024-12-31 PROCEDURE — G0156 HHCP-SVS OF AIDE,EA 15 MIN: HCPCS

## 2025-01-01 ENCOUNTER — HOME CARE VISIT (OUTPATIENT)
Dept: HOME HEALTH SERVICES | Facility: HOME HEALTHCARE | Age: OVER 89
End: 2025-01-01
Payer: MEDICARE

## 2025-01-01 ENCOUNTER — HOME CARE VISIT (OUTPATIENT)
Dept: HOME HOSPICE | Facility: HOSPICE | Age: OVER 89
End: 2025-01-01
Payer: MEDICARE

## 2025-01-01 VITALS — HEART RATE: 116 BPM | RESPIRATION RATE: 16 BRPM

## 2025-01-01 VITALS — RESPIRATION RATE: 20 BRPM | HEART RATE: 92 BPM

## 2025-01-01 VITALS — RESPIRATION RATE: 18 BRPM | HEART RATE: 100 BPM

## 2025-01-01 DIAGNOSIS — Z51.5 HOSPICE CARE PATIENT: ICD-10-CM

## 2025-01-01 DIAGNOSIS — F03.90 SENILE DEMENTIA (HCC): ICD-10-CM

## 2025-01-01 DIAGNOSIS — E43 SEVERE PROTEIN-CALORIE MALNUTRITION (HCC): ICD-10-CM

## 2025-01-01 PROCEDURE — G0156 HHCP-SVS OF AIDE,EA 15 MIN: HCPCS

## 2025-01-01 PROCEDURE — G0299 HHS/HOSPICE OF RN EA 15 MIN: HCPCS

## 2025-01-01 PROCEDURE — G0155 HHCP-SVS OF CSW,EA 15 MIN: HCPCS

## 2025-01-01 RX ORDER — LORAZEPAM 2 MG/ML
CONCENTRATE ORAL
Qty: 30 ML | Refills: 0 | Status: SHIPPED | OUTPATIENT
Start: 2025-01-01

## 2025-01-01 RX ORDER — MORPHINE SULFATE 100 MG/5ML
SOLUTION ORAL
Qty: 30 ML | Refills: 0 | Status: SHIPPED | OUTPATIENT
Start: 2025-01-01

## 2025-01-03 ENCOUNTER — HOME CARE VISIT (OUTPATIENT)
Dept: HOME HEALTH SERVICES | Facility: HOME HEALTHCARE | Age: OVER 89
End: 2025-01-03
Payer: MEDICARE

## 2025-01-03 PROCEDURE — G0156 HHCP-SVS OF AIDE,EA 15 MIN: HCPCS

## 2025-01-06 ENCOUNTER — HOME CARE VISIT (OUTPATIENT)
Dept: HOME HEALTH SERVICES | Facility: HOME HEALTHCARE | Age: OVER 89
End: 2025-01-06
Payer: MEDICARE

## 2025-01-06 PROCEDURE — G0156 HHCP-SVS OF AIDE,EA 15 MIN: HCPCS

## 2025-01-07 ENCOUNTER — HOME CARE VISIT (OUTPATIENT)
Dept: HOME HOSPICE | Facility: HOSPICE | Age: OVER 89
End: 2025-01-07
Payer: MEDICARE

## 2025-01-07 PROCEDURE — G0155 HHCP-SVS OF CSW,EA 15 MIN: HCPCS

## 2025-01-08 ENCOUNTER — HOME CARE VISIT (OUTPATIENT)
Dept: HOME HEALTH SERVICES | Facility: HOME HEALTHCARE | Age: OVER 89
End: 2025-01-08
Payer: MEDICARE

## 2025-01-08 VITALS
HEART RATE: 100 BPM | SYSTOLIC BLOOD PRESSURE: 130 MMHG | DIASTOLIC BLOOD PRESSURE: 70 MMHG | RESPIRATION RATE: 20 BRPM | TEMPERATURE: 98 F

## 2025-01-08 DIAGNOSIS — Z51.5 HOSPICE CARE PATIENT: ICD-10-CM

## 2025-01-08 DIAGNOSIS — E43 SEVERE PROTEIN-CALORIE MALNUTRITION (HCC): ICD-10-CM

## 2025-01-08 DIAGNOSIS — F03.90 SENILE DEMENTIA (HCC): ICD-10-CM

## 2025-01-08 PROCEDURE — G0156 HHCP-SVS OF AIDE,EA 15 MIN: HCPCS

## 2025-01-08 PROCEDURE — G0299 HHS/HOSPICE OF RN EA 15 MIN: HCPCS

## 2025-01-08 RX ORDER — MORPHINE SULFATE 100 MG/5ML
SOLUTION ORAL
Qty: 30 ML | Refills: 0 | Status: SHIPPED | OUTPATIENT
Start: 2025-01-08

## 2025-01-08 NOTE — TELEPHONE ENCOUNTER
1/8/2025 9:38 AM  [facility] facility patient requests refill of CII medication and medication adjusted due to change in patient condition.  Filled electronically via Epic as per [place] State Law.    Requested Prescriptions     Signed Prescriptions Disp Refills    morphine 20 mg/mL concentrated solution 30 mL 0     Sig: Take 0.25 mL (5 mg total) by mouth every 8 (eight) hours. May also take 0.25 mL (5 mg total) every 2 (two) hours as needed (paain / SOB). Max Daily Amount: 75 mg.     Authorizing Provider: JANETH MONTERO CRNP  [facility]  Hospice Answering Service: [phone]

## 2025-01-09 ENCOUNTER — HOME CARE VISIT (OUTPATIENT)
Dept: HOME HEALTH SERVICES | Facility: HOME HEALTHCARE | Age: OVER 89
End: 2025-01-09
Payer: MEDICARE

## 2025-01-09 PROCEDURE — G0156 HHCP-SVS OF AIDE,EA 15 MIN: HCPCS

## 2025-01-10 ENCOUNTER — HOME CARE VISIT (OUTPATIENT)
Dept: HOME HEALTH SERVICES | Facility: HOME HEALTHCARE | Age: OVER 89
End: 2025-01-10
Payer: MEDICARE

## 2025-01-10 PROCEDURE — G0156 HHCP-SVS OF AIDE,EA 15 MIN: HCPCS

## 2025-01-13 ENCOUNTER — HOME CARE VISIT (OUTPATIENT)
Dept: HOME HEALTH SERVICES | Facility: HOME HEALTHCARE | Age: OVER 89
End: 2025-01-13
Payer: MEDICARE

## 2025-01-13 VITALS
SYSTOLIC BLOOD PRESSURE: 122 MMHG | RESPIRATION RATE: 20 BRPM | HEART RATE: 92 BPM | DIASTOLIC BLOOD PRESSURE: 70 MMHG | TEMPERATURE: 96 F

## 2025-01-13 PROCEDURE — G0156 HHCP-SVS OF AIDE,EA 15 MIN: HCPCS

## 2025-01-13 PROCEDURE — G0299 HHS/HOSPICE OF RN EA 15 MIN: HCPCS

## 2025-01-14 ENCOUNTER — HOME CARE VISIT (OUTPATIENT)
Dept: HOME HEALTH SERVICES | Facility: HOME HEALTHCARE | Age: OVER 89
End: 2025-01-14
Payer: MEDICARE

## 2025-01-14 PROCEDURE — G0156 HHCP-SVS OF AIDE,EA 15 MIN: HCPCS

## 2025-01-15 ENCOUNTER — HOME CARE VISIT (OUTPATIENT)
Dept: HOME HEALTH SERVICES | Facility: HOME HEALTHCARE | Age: OVER 89
End: 2025-01-15
Payer: MEDICARE

## 2025-01-15 PROCEDURE — G0156 HHCP-SVS OF AIDE,EA 15 MIN: HCPCS

## 2025-01-16 ENCOUNTER — HOME CARE VISIT (OUTPATIENT)
Dept: HOME HEALTH SERVICES | Facility: HOME HEALTHCARE | Age: OVER 89
End: 2025-01-16
Payer: MEDICARE

## 2025-01-16 PROCEDURE — G0156 HHCP-SVS OF AIDE,EA 15 MIN: HCPCS

## 2025-01-17 ENCOUNTER — HOME CARE VISIT (OUTPATIENT)
Dept: HOME HEALTH SERVICES | Facility: HOME HEALTHCARE | Age: OVER 89
End: 2025-01-17
Payer: MEDICARE

## 2025-01-17 ENCOUNTER — HOME CARE VISIT (OUTPATIENT)
Dept: HOME HOSPICE | Facility: HOSPICE | Age: OVER 89
End: 2025-01-17
Payer: MEDICARE

## 2025-01-17 PROCEDURE — G0156 HHCP-SVS OF AIDE,EA 15 MIN: HCPCS

## 2025-01-21 NOTE — TELEPHONE ENCOUNTER
1/21/2025 2:02 PM  [facility] facility patient requests refill of CII medication and medication adjusted due to change in patient condition.  Filled electronically via Epic as per [place] State Law.    Requested Prescriptions     Signed Prescriptions Disp Refills    LORazepam (ATIVAN) 2 mg/mL concentrated solution 30 mL 0     Sig: Take 0.25 mL (0.5 mg total) by mouth every 6 (six) hours. May also take 0.25 mL (0.5 mg total) every hour as needed for anxiety or sleep (SOB/restlessness). Administer under the tongue - Please also administer 30 minutes prior to wound care.     Authorizing Provider: JANETH MONTERO    morphine 20 mg/mL concentrated solution 30 mL 0     Sig: Take 0.5 mL (10 mg total) by mouth every 6 (six) hours. May also take 0.5 mL (10 mg total) every hour as needed (pain / SOB). Please also administer 30 minutes prior to wound care. Max Daily Amount: 280 mg.     Authorizing Provider: JANETH MONTERO CRNP  [facility]  Hospice Answering Service: [phone]

## 2025-01-27 ENCOUNTER — DOCUMENTATION (OUTPATIENT)
Dept: GERIATRICS | Facility: OTHER | Age: OVER 89
End: 2025-01-27

## 2025-01-27 NOTE — PROGRESS NOTES
Discharge summary:    1) Dementia  2025 1730  [facility]  Home   Pt dnr/dni/hospice  Family notified  [id number] death certificate online certified

## 2025-01-28 VITALS — RESPIRATION RATE: 14 BRPM

## 2025-01-29 ENCOUNTER — HOME CARE VISIT (OUTPATIENT)
Dept: HOME HOSPICE | Facility: HOSPICE | Age: OVER 89
End: 2025-01-29
Payer: MEDICARE

## 2025-01-30 ENCOUNTER — HOME CARE VISIT (OUTPATIENT)
Dept: HOME HOSPICE | Facility: HOSPICE | Age: OVER 89
End: 2025-01-30
Payer: MEDICARE